# Patient Record
Sex: MALE | Employment: OTHER | URBAN - METROPOLITAN AREA
[De-identification: names, ages, dates, MRNs, and addresses within clinical notes are randomized per-mention and may not be internally consistent; named-entity substitution may affect disease eponyms.]

---

## 2019-07-04 PROBLEM — I50.9 ACUTE ON CHRONIC HEART FAILURE: Status: ACTIVE | Noted: 2019-07-04

## 2019-07-05 ENCOUNTER — HOSPITAL ENCOUNTER (INPATIENT)
Facility: HOSPITAL | Age: 74
LOS: 7 days | Discharge: HOME HEALTH | DRG: 291 | End: 2019-07-12
Attending: INTERNAL MEDICINE | Admitting: INTERNAL MEDICINE
Payer: MEDICARE

## 2019-07-05 DIAGNOSIS — A49.8 INFECTION DUE TO STENOTROPHOMONAS MALTOPHILIA: ICD-10-CM

## 2019-07-05 DIAGNOSIS — J18.9 PNEUMONIA: ICD-10-CM

## 2019-07-05 DIAGNOSIS — J44.1 COPD EXACERBATION: ICD-10-CM

## 2019-07-05 DIAGNOSIS — J96.12 CHRONIC HYPERCAPNIC RESPIRATORY FAILURE: ICD-10-CM

## 2019-07-05 DIAGNOSIS — I48.0 PAROXYSMAL ATRIAL FIBRILLATION: ICD-10-CM

## 2019-07-05 DIAGNOSIS — R00.0 TACHYCARDIA: ICD-10-CM

## 2019-07-05 DIAGNOSIS — I48.91 A-FIB: ICD-10-CM

## 2019-07-05 DIAGNOSIS — I50.9 ACUTE ON CHRONIC HEART FAILURE: ICD-10-CM

## 2019-07-05 DIAGNOSIS — I49.9 ARRHYTHMIA: ICD-10-CM

## 2019-07-05 DIAGNOSIS — I25.10 CAD (CORONARY ARTERY DISEASE): ICD-10-CM

## 2019-07-05 DIAGNOSIS — J90 PLEURAL EFFUSION: ICD-10-CM

## 2019-07-05 DIAGNOSIS — I35.0 SEVERE AORTIC STENOSIS: ICD-10-CM

## 2019-07-05 DIAGNOSIS — I35.0 AORTIC STENOSIS: ICD-10-CM

## 2019-07-05 DIAGNOSIS — I10 HYPERTENSION: ICD-10-CM

## 2019-07-05 DIAGNOSIS — I35.0 NONRHEUMATIC AORTIC VALVE STENOSIS: ICD-10-CM

## 2019-07-05 DIAGNOSIS — J18.9 PNEUMONIA DUE TO INFECTIOUS ORGANISM, UNSPECIFIED LATERALITY, UNSPECIFIED PART OF LUNG: ICD-10-CM

## 2019-07-05 DIAGNOSIS — I34.0 NON-RHEUMATIC MITRAL REGURGITATION: ICD-10-CM

## 2019-07-05 DIAGNOSIS — I27.20 PULMONARY HYPERTENSION: Primary | ICD-10-CM

## 2019-07-05 PROBLEM — J44.9 COPD (CHRONIC OBSTRUCTIVE PULMONARY DISEASE): Status: ACTIVE | Noted: 2019-07-05

## 2019-07-05 LAB
ALBUMIN SERPL BCP-MCNC: 3.2 G/DL (ref 3.5–5.2)
ALP SERPL-CCNC: 59 U/L (ref 55–135)
ALT SERPL W/O P-5'-P-CCNC: 38 U/L (ref 10–44)
ANION GAP SERPL CALC-SCNC: 12 MMOL/L (ref 8–16)
APTT BLDCRRT: 26.2 SEC (ref 21–32)
APTT BLDCRRT: 44.3 SEC (ref 21–32)
APTT BLDCRRT: >150 SEC (ref 21–32)
AST SERPL-CCNC: 19 U/L (ref 10–40)
BASOPHILS # BLD AUTO: 0.01 K/UL (ref 0–0.2)
BASOPHILS NFR BLD: 0.1 % (ref 0–1.9)
BILIRUB SERPL-MCNC: 0.6 MG/DL (ref 0.1–1)
BILIRUB UR QL STRIP: NEGATIVE
BUN SERPL-MCNC: 79 MG/DL (ref 8–23)
CALCIUM SERPL-MCNC: 8.7 MG/DL (ref 8.7–10.5)
CHLORIDE SERPL-SCNC: 101 MMOL/L (ref 95–110)
CLARITY UR REFRACT.AUTO: CLEAR
CO2 SERPL-SCNC: 28 MMOL/L (ref 23–29)
COLOR UR AUTO: NORMAL
CREAT SERPL-MCNC: 2.6 MG/DL (ref 0.5–1.4)
DIFFERENTIAL METHOD: ABNORMAL
EOSINOPHIL # BLD AUTO: 0 K/UL (ref 0–0.5)
EOSINOPHIL NFR BLD: 0 % (ref 0–8)
ERYTHROCYTE [DISTWIDTH] IN BLOOD BY AUTOMATED COUNT: 20.4 % (ref 11.5–14.5)
EST. GFR  (AFRICAN AMERICAN): 26.7 ML/MIN/1.73 M^2
EST. GFR  (NON AFRICAN AMERICAN): 23.1 ML/MIN/1.73 M^2
GLUCOSE SERPL-MCNC: 108 MG/DL (ref 70–110)
GLUCOSE UR QL STRIP: NEGATIVE
HCT VFR BLD AUTO: 27.1 % (ref 40–54)
HGB BLD-MCNC: 7.6 G/DL (ref 14–18)
HGB UR QL STRIP: NEGATIVE
IMM GRANULOCYTES # BLD AUTO: 0.18 K/UL (ref 0–0.04)
IMM GRANULOCYTES NFR BLD AUTO: 0.9 % (ref 0–0.5)
INR PPP: 1.1 (ref 0.8–1.2)
KETONES UR QL STRIP: NEGATIVE
LEUKOCYTE ESTERASE UR QL STRIP: NEGATIVE
LYMPHOCYTES # BLD AUTO: 1.3 K/UL (ref 1–4.8)
LYMPHOCYTES NFR BLD: 6.6 % (ref 18–48)
MAGNESIUM SERPL-MCNC: 2.7 MG/DL (ref 1.6–2.6)
MCH RBC QN AUTO: 19.5 PG (ref 27–31)
MCHC RBC AUTO-ENTMCNC: 28 G/DL (ref 32–36)
MCV RBC AUTO: 70 FL (ref 82–98)
MONOCYTES # BLD AUTO: 1.5 K/UL (ref 0.3–1)
MONOCYTES NFR BLD: 7.6 % (ref 4–15)
NEUTROPHILS # BLD AUTO: 16.3 K/UL (ref 1.8–7.7)
NEUTROPHILS NFR BLD: 84.8 % (ref 38–73)
NITRITE UR QL STRIP: NEGATIVE
NRBC BLD-RTO: 0 /100 WBC
PH UR STRIP: 5 [PH] (ref 5–8)
PHOSPHATE SERPL-MCNC: 6.1 MG/DL (ref 2.7–4.5)
PLATELET # BLD AUTO: 231 K/UL (ref 150–350)
PMV BLD AUTO: 11.7 FL (ref 9.2–12.9)
POTASSIUM SERPL-SCNC: 4.6 MMOL/L (ref 3.5–5.1)
PROT SERPL-MCNC: 5.7 G/DL (ref 6–8.4)
PROT UR QL STRIP: NEGATIVE
PROTHROMBIN TIME: 11.4 SEC (ref 9–12.5)
RBC # BLD AUTO: 3.9 M/UL (ref 4.6–6.2)
SODIUM SERPL-SCNC: 141 MMOL/L (ref 136–145)
SP GR UR STRIP: 1.02 (ref 1–1.03)
URN SPEC COLLECT METH UR: NORMAL
WBC # BLD AUTO: 19.26 K/UL (ref 3.9–12.7)

## 2019-07-05 PROCEDURE — 85610 PROTHROMBIN TIME: CPT

## 2019-07-05 PROCEDURE — 85730 THROMBOPLASTIN TIME PARTIAL: CPT

## 2019-07-05 PROCEDURE — 25000003 PHARM REV CODE 250: Performed by: STUDENT IN AN ORGANIZED HEALTH CARE EDUCATION/TRAINING PROGRAM

## 2019-07-05 PROCEDURE — 81003 URINALYSIS AUTO W/O SCOPE: CPT

## 2019-07-05 PROCEDURE — 85025 COMPLETE CBC W/AUTO DIFF WBC: CPT

## 2019-07-05 PROCEDURE — 20000000 HC ICU ROOM

## 2019-07-05 PROCEDURE — 27000221 HC OXYGEN, UP TO 24 HOURS

## 2019-07-05 PROCEDURE — 99223 1ST HOSP IP/OBS HIGH 75: CPT | Mod: ,,, | Performed by: INTERNAL MEDICINE

## 2019-07-05 PROCEDURE — 83735 ASSAY OF MAGNESIUM: CPT

## 2019-07-05 PROCEDURE — 63600175 PHARM REV CODE 636 W HCPCS

## 2019-07-05 PROCEDURE — 99223 PR INITIAL HOSPITAL CARE,LEVL III: ICD-10-PCS | Mod: ,,, | Performed by: INTERNAL MEDICINE

## 2019-07-05 PROCEDURE — 85730 THROMBOPLASTIN TIME PARTIAL: CPT | Mod: 91

## 2019-07-05 PROCEDURE — 94640 AIRWAY INHALATION TREATMENT: CPT

## 2019-07-05 PROCEDURE — 25000242 PHARM REV CODE 250 ALT 637 W/ HCPCS

## 2019-07-05 PROCEDURE — 25000003 PHARM REV CODE 250

## 2019-07-05 PROCEDURE — 80053 COMPREHEN METABOLIC PANEL: CPT

## 2019-07-05 PROCEDURE — 84100 ASSAY OF PHOSPHORUS: CPT

## 2019-07-05 RX ORDER — METOPROLOL TARTRATE 25 MG/1
25 TABLET, FILM COATED ORAL 2 TIMES DAILY
Status: DISCONTINUED | OUTPATIENT
Start: 2019-07-05 | End: 2019-07-12 | Stop reason: HOSPADM

## 2019-07-05 RX ORDER — SODIUM,POTASSIUM PHOSPHATES 280-250MG
2 POWDER IN PACKET (EA) ORAL
Status: DISCONTINUED | OUTPATIENT
Start: 2019-07-05 | End: 2019-07-12 | Stop reason: HOSPADM

## 2019-07-05 RX ORDER — POTASSIUM CHLORIDE 20 MEQ/15ML
60 SOLUTION ORAL
Status: DISCONTINUED | OUTPATIENT
Start: 2019-07-05 | End: 2019-07-12 | Stop reason: HOSPADM

## 2019-07-05 RX ORDER — ALBUTEROL SULFATE 90 UG/1
2 AEROSOL, METERED RESPIRATORY (INHALATION) EVERY 6 HOURS PRN
COMMUNITY

## 2019-07-05 RX ORDER — LANOLIN ALCOHOL/MO/W.PET/CERES
800 CREAM (GRAM) TOPICAL
Status: DISCONTINUED | OUTPATIENT
Start: 2019-07-05 | End: 2019-07-12 | Stop reason: HOSPADM

## 2019-07-05 RX ORDER — ACETAMINOPHEN 325 MG/1
325 TABLET ORAL EVERY 4 HOURS PRN
Status: DISCONTINUED | OUTPATIENT
Start: 2019-07-05 | End: 2019-07-12 | Stop reason: HOSPADM

## 2019-07-05 RX ORDER — ONDANSETRON 2 MG/ML
4 INJECTION INTRAMUSCULAR; INTRAVENOUS EVERY 8 HOURS PRN
Status: DISCONTINUED | OUTPATIENT
Start: 2019-07-05 | End: 2019-07-12 | Stop reason: HOSPADM

## 2019-07-05 RX ORDER — IPRATROPIUM BROMIDE AND ALBUTEROL SULFATE 2.5; .5 MG/3ML; MG/3ML
3 SOLUTION RESPIRATORY (INHALATION) EVERY 6 HOURS PRN
Status: DISCONTINUED | OUTPATIENT
Start: 2019-07-05 | End: 2019-07-11

## 2019-07-05 RX ORDER — CALCIUM CARBONATE 200(500)MG
500 TABLET,CHEWABLE ORAL DAILY PRN
Status: DISCONTINUED | OUTPATIENT
Start: 2019-07-05 | End: 2019-07-05

## 2019-07-05 RX ORDER — HEPARIN SODIUM,PORCINE/D5W 25000/250
12 INTRAVENOUS SOLUTION INTRAVENOUS CONTINUOUS
Status: DISCONTINUED | OUTPATIENT
Start: 2019-07-05 | End: 2019-07-12

## 2019-07-05 RX ORDER — AMLODIPINE BESYLATE 10 MG/1
10 TABLET ORAL DAILY
Status: DISCONTINUED | OUTPATIENT
Start: 2019-07-05 | End: 2019-07-05

## 2019-07-05 RX ORDER — ROSUVASTATIN CALCIUM 20 MG/1
20 TABLET, COATED ORAL DAILY
Status: DISCONTINUED | OUTPATIENT
Start: 2019-07-05 | End: 2019-07-07

## 2019-07-05 RX ORDER — POLYETHYLENE GLYCOL 3350 17 G/17G
17 POWDER, FOR SOLUTION ORAL DAILY
Status: DISCONTINUED | OUTPATIENT
Start: 2019-07-05 | End: 2019-07-12 | Stop reason: HOSPADM

## 2019-07-05 RX ORDER — METOPROLOL TARTRATE 25 MG/1
25 TABLET, FILM COATED ORAL 2 TIMES DAILY
COMMUNITY

## 2019-07-05 RX ORDER — AMLODIPINE BESYLATE 10 MG/1
10 TABLET ORAL DAILY
Status: ON HOLD | COMMUNITY
End: 2019-07-11 | Stop reason: HOSPADM

## 2019-07-05 RX ORDER — CALCIUM CARBONATE 200(500)MG
500 TABLET,CHEWABLE ORAL DAILY PRN
Status: DISCONTINUED | OUTPATIENT
Start: 2019-07-05 | End: 2019-07-07

## 2019-07-05 RX ORDER — POTASSIUM CHLORIDE 20 MEQ/15ML
40 SOLUTION ORAL
Status: DISCONTINUED | OUTPATIENT
Start: 2019-07-05 | End: 2019-07-12 | Stop reason: HOSPADM

## 2019-07-05 RX ORDER — METHYLPREDNISOLONE 4 MG/1
4 TABLET ORAL
COMMUNITY

## 2019-07-05 RX ORDER — NITROGLYCERIN 0.4 MG/1
0.4 TABLET SUBLINGUAL EVERY 5 MIN PRN
Status: DISCONTINUED | OUTPATIENT
Start: 2019-07-05 | End: 2019-07-12 | Stop reason: HOSPADM

## 2019-07-05 RX ORDER — AMIODARONE HYDROCHLORIDE 200 MG/1
400 TABLET ORAL 2 TIMES DAILY
Status: ON HOLD | COMMUNITY
End: 2019-07-11 | Stop reason: HOSPADM

## 2019-07-05 RX ORDER — NITROGLYCERIN 0.4 MG/1
0.4 TABLET SUBLINGUAL EVERY 5 MIN PRN
COMMUNITY

## 2019-07-05 RX ORDER — GUAIFENESIN 600 MG/1
600 TABLET, EXTENDED RELEASE ORAL DAILY
COMMUNITY

## 2019-07-05 RX ORDER — FUROSEMIDE 40 MG/1
40 TABLET ORAL DAILY
Status: ON HOLD | COMMUNITY
End: 2019-07-11 | Stop reason: HOSPADM

## 2019-07-05 RX ORDER — GUAIFENESIN 600 MG/1
600 TABLET, EXTENDED RELEASE ORAL DAILY
Status: DISCONTINUED | OUTPATIENT
Start: 2019-07-05 | End: 2019-07-12 | Stop reason: HOSPADM

## 2019-07-05 RX ORDER — HEPARIN SODIUM 5000 [USP'U]/ML
5000 INJECTION, SOLUTION INTRAVENOUS; SUBCUTANEOUS EVERY 8 HOURS
Status: DISCONTINUED | OUTPATIENT
Start: 2019-07-05 | End: 2019-07-05

## 2019-07-05 RX ORDER — AMIODARONE HYDROCHLORIDE 200 MG/1
400 TABLET ORAL 2 TIMES DAILY
Status: DISCONTINUED | OUTPATIENT
Start: 2019-07-05 | End: 2019-07-10

## 2019-07-05 RX ORDER — ROSUVASTATIN CALCIUM 20 MG/1
20 TABLET, COATED ORAL DAILY
COMMUNITY

## 2019-07-05 RX ORDER — POTASSIUM CHLORIDE 750 MG/1
20 TABLET, EXTENDED RELEASE ORAL ONCE
COMMUNITY

## 2019-07-05 RX ORDER — IPRATROPIUM BROMIDE 0.5 MG/2.5ML
500 SOLUTION RESPIRATORY (INHALATION) 4 TIMES DAILY
COMMUNITY

## 2019-07-05 RX ORDER — PANTOPRAZOLE SODIUM 40 MG/1
40 TABLET, DELAYED RELEASE ORAL DAILY
Status: DISCONTINUED | OUTPATIENT
Start: 2019-07-05 | End: 2019-07-12 | Stop reason: HOSPADM

## 2019-07-05 RX ORDER — ALBUTEROL SULFATE 1.25 MG/3ML
2.5 SOLUTION RESPIRATORY (INHALATION) EVERY 6 HOURS PRN
COMMUNITY

## 2019-07-05 RX ORDER — FUROSEMIDE 10 MG/ML
40 INJECTION INTRAMUSCULAR; INTRAVENOUS DAILY
Status: DISCONTINUED | OUTPATIENT
Start: 2019-07-05 | End: 2019-07-09

## 2019-07-05 RX ORDER — SODIUM CHLORIDE 0.9 % (FLUSH) 0.9 %
10 SYRINGE (ML) INJECTION
Status: DISCONTINUED | OUTPATIENT
Start: 2019-07-05 | End: 2019-07-12 | Stop reason: HOSPADM

## 2019-07-05 RX ORDER — LANSOPRAZOLE 30 MG/1
30 CAPSULE, DELAYED RELEASE ORAL DAILY
COMMUNITY

## 2019-07-05 RX ORDER — RAMELTEON 8 MG/1
8 TABLET ORAL NIGHTLY PRN
Status: DISCONTINUED | OUTPATIENT
Start: 2019-07-05 | End: 2019-07-12 | Stop reason: HOSPADM

## 2019-07-05 RX ADMIN — PANTOPRAZOLE SODIUM 40 MG: 40 TABLET, DELAYED RELEASE ORAL at 12:07

## 2019-07-05 RX ADMIN — FUROSEMIDE 40 MG: 10 INJECTION, SOLUTION INTRAVENOUS at 12:07

## 2019-07-05 RX ADMIN — CALCIUM CARBONATE (ANTACID) CHEW TAB 500 MG 500 MG: 500 CHEW TAB at 05:07

## 2019-07-05 RX ADMIN — ROSUVASTATIN CALCIUM 20 MG: 20 TABLET, FILM COATED ORAL at 12:07

## 2019-07-05 RX ADMIN — METOPROLOL TARTRATE 25 MG: 25 TABLET, FILM COATED ORAL at 09:07

## 2019-07-05 RX ADMIN — AMIODARONE HYDROCHLORIDE 400 MG: 200 TABLET ORAL at 09:07

## 2019-07-05 RX ADMIN — AMIODARONE HYDROCHLORIDE 400 MG: 200 TABLET ORAL at 12:07

## 2019-07-05 RX ADMIN — METOPROLOL TARTRATE 25 MG: 25 TABLET, FILM COATED ORAL at 12:07

## 2019-07-05 RX ADMIN — IPRATROPIUM BROMIDE AND ALBUTEROL SULFATE 3 ML: .5; 3 SOLUTION RESPIRATORY (INHALATION) at 08:07

## 2019-07-05 RX ADMIN — GUAIFENESIN 600 MG: 600 TABLET, EXTENDED RELEASE ORAL at 12:07

## 2019-07-05 RX ADMIN — HEPARIN SODIUM AND DEXTROSE 12 UNITS/KG/HR: 10000; 5 INJECTION INTRAVENOUS at 02:07

## 2019-07-05 RX ADMIN — POLYETHYLENE GLYCOL 3350 17 G: 17 POWDER, FOR SOLUTION ORAL at 12:07

## 2019-07-05 NOTE — ASSESSMENT & PLAN NOTE
50+ year smoking history; on home O2. Received nebs and solu-medrol at OSH.     Plan:  - duo-nebs prn  - oxygen support with NC, goal Sat 88%

## 2019-07-05 NOTE — ASSESSMENT & PLAN NOTE
TTE at OSH showed severe MR, moderate AS and TR with preserved LV function. Patient is transferred to [facility] [place] for further evaluation of possible MVR/TVR/AVR vs TAVR/MV clip in a stable condition by [facility].     Plan:  - Consult CTS and Interventional Cardiology for options

## 2019-07-05 NOTE — SUBJECTIVE & OBJECTIVE
No past medical history on file.    No past surgical history on file.    Review of patient's allergies indicates:   Allergen Reactions    Bacitracin/polymyxin      inflammation    Bactrim [sulfamethoxazole-trimethoprim] Blisters       No current facility-administered medications on file prior to encounter.      Current Outpatient Medications on File Prior to Encounter   Medication Sig    albuterol (ACCUNEB) 1.25 mg/3 mL Nebu Take 2.5 mg by nebulization every 6 (six) hours as needed. Rescue    albuterol (PROAIR HFA) 90 mcg/actuation inhaler Inhale 2 puffs into the lungs every 6 (six) hours as needed for Wheezing. Rescue    amiodarone (PACERONE) 200 MG Tab Take 400 mg by mouth 2 (two) times daily.    amLODIPine (NORVASC) 10 MG tablet Take 10 mg by mouth once daily.    apixaban (ELIQUIS) 5 mg Tab Take 5 mg by mouth 2 (two) times daily.    fluticasone-umeclidin-vilanter (TRELEGY ELLIPTA) 100-62.5-25 mcg DsDv Inhale 1 puff into the lungs once daily.    furosemide (LASIX) 40 MG tablet Take 40 mg by mouth once daily.    guaiFENesin (MUCINEX) 600 mg 12 hr tablet Take 600 mg by mouth once daily.    ipratropium (ATROVENT) 0.02 % nebulizer solution Take 500 mcg by nebulization 4 (four) times daily. Rescue    lansoprazole (PREVACID) 30 MG capsule Take 30 mg by mouth once daily.    methylPREDNISolone (MEDROL DOSEPACK) 4 mg tablet Take 4 mg by mouth. use as directed    metoprolol tartrate (LOPRESSOR) 25 MG tablet Take 25 mg by mouth 2 (two) times daily.    nitroGLYCERIN (NITROSTAT) 0.4 MG SL tablet Place 0.4 mg under the tongue every 5 (five) minutes as needed for Chest pain.    potassium chloride (KLOR-CON) 10 MEQ TbSR Take 20 mEq by mouth once.    rosuvastatin (CRESTOR) 20 MG tablet Take 20 mg by mouth once daily.     Family History     None        Tobacco Use    Smoking status: Not on file   Substance and Sexual Activity    Alcohol use: Not on file    Drug use: Not on file    Sexual activity: Not on file      Review of Systems   Constitution: Negative for chills and fever.   HENT: Negative for congestion and sore throat.    Eyes: Negative for photophobia and visual disturbance.   Cardiovascular: Positive for dyspnea on exertion. Negative for chest pain, irregular heartbeat, palpitations and syncope.   Respiratory: Positive for cough, shortness of breath and sputum production.    Skin: Negative for itching and rash.   Musculoskeletal: Negative for falls.   Gastrointestinal: Negative for constipation, diarrhea, nausea and vomiting.   Genitourinary: Negative for dysuria and flank pain.   Neurological: Negative for dizziness and headaches.   Psychiatric/Behavioral: Negative for altered mental status and memory loss.     Objective:     Vital Signs (Most Recent):  Temp: 97.9 °F (36.6 °C) (07/05/19 1500)  Pulse: (!) 58 (07/05/19 1600)  Resp: (!) 27 (07/05/19 1600)  BP: 118/61 (07/05/19 1600)  SpO2: 97 % (07/05/19 1600) Vital Signs (24h Range):  Temp:  [97.7 °F (36.5 °C)-98.2 °F (36.8 °C)] 97.9 °F (36.6 °C)  Pulse:  [58-68] 58  Resp:  [18-39] 27  SpO2:  [92 %-99 %] 97 %  BP: (108-125)/(53-63) 118/61        There is no height or weight on file to calculate BMI.    SpO2: 97 %  O2 Device (Oxygen Therapy): nasal cannula      Intake/Output Summary (Last 24 hours) at 7/5/2019 1627  Last data filed at 7/5/2019 1600  Gross per 24 hour   Intake 59.55 ml   Output 400 ml   Net -340.45 ml       Lines/Drains/Airways     Peripheral Intravenous Line                 Peripheral IV - Single Lumen 07/05/19 0100 20 G Distal;Left;Posterior Forearm less than 1 day         Peripheral IV - Single Lumen 07/05/19 1300 20 G Posterior;Right Hand less than 1 day                Physical Exam   Constitutional: He is oriented to person, place, and time. No distress.   HENT:   Head: Normocephalic and atraumatic.   Mouth/Throat: Oropharynx is clear and moist.   Eyes: Right eye exhibits no discharge. Left eye exhibits no discharge. No scleral icterus.   Neck:  JVD present.   Cardiovascular: Normal rate and regular rhythm.   Murmur heard.  Pulmonary/Chest: Effort normal. No stridor. No respiratory distress. He has wheezes.   On 4L NC   Abdominal: Soft. Bowel sounds are normal. He exhibits no distension.   Musculoskeletal: He exhibits edema (bilateral pitting edema to ankles). He exhibits no tenderness.   Neurological: He is alert and oriented to person, place, and time.   Skin: Skin is warm and dry. He is not diaphoretic.   Vitals reviewed.    Significant Labs:   CMP   Recent Labs   Lab 07/05/19  1153      K 4.6      CO2 28      BUN 79*   CREATININE 2.6*   CALCIUM 8.7   PROT 5.7*   ALBUMIN 3.2*   BILITOT 0.6   ALKPHOS 59   AST 19   ALT 38   ANIONGAP 12   ESTGFRAFRICA 26.7*   EGFRNONAA 23.1*   , CBC   Recent Labs   Lab 07/05/19  1153   WBC 19.26*   HGB 7.6*   HCT 27.1*       and All pertinent lab results from the last 24 hours have been reviewed.    Significant Imaging:     CXR pending

## 2019-07-05 NOTE — PLAN OF CARE
Problem: Adult Inpatient Plan of Care  Goal: Plan of Care Review  Outcome: Ongoing (interventions implemented as appropriate)  No acute events throughout day. See vital signs and assessments in flowsheets. See below for updates on today's progress.     Pulmonary: Bilaterally diminished throughout equally, 4L NC. SATs upper 90s.    Cardiovascular: HR 60s. S1S2 present. Mitral valve regurg.    Neurological: AAOx4. Pupils 3mm bilaterally, brisk and equal.    Gastrointestinal: Bowels audible and normoactive. No BM noted.    Genitourinary: Voids spontaneously, urinal within reach.     Skin/Bath: Bruised throughout. Blister on sternum.    Infusions: Heparin.     POC: Stepdown the morning of 7/06/19. Valve evaluation.    Patient progressing towards goals as tolerated, plan of care communicated and reviewed with patient and family. All concerns addressed. Will continue to monitor.

## 2019-07-05 NOTE — ASSESSMENT & PLAN NOTE
74 y.o. M w/ CAD, COPD on home O2, HTN, CVA, pAF (eliquis, amiodarone) presented w/ acute onset of SOB concerning for ADHF and COPD exacerbation. MANUEL found showed severe MR, moderate AS and TR with preserved LV function. Required BIPAP, nitroglycerin gtt and IV diuresis at OSH.     Plan:  - F/u CXR, BNP, electrolytes  - IV furosemide 40mg QD  - continue home lopressor 25mg BID, atorvastatin 40mg  - strict I/O  - daily weights  - monitor in the ICU overnight; step down tomorrow if remains stable

## 2019-07-05 NOTE — H&P
[facility]  Cardiology  History and Physical     Patient Name: Wiley Alfred  MRN: [id number]  Admission Date: 7/5/2019  Code Status: Full Code   Attending Provider: Aaron Bacon MD   Primary Care Physician: Daniel Almeida MD  Principal Problem:Acute on chronic heart failure    Patient information was obtained from patient, past medical records and ER records.     Subjective:     Chief Complaint:  SOB     HPI:  Patient is 74 y.o. M w/ aortic stenosis, CAD, COPD on home O2, CVA, pAF (eliquis, amiodarone) presented to [facility]/ acute onset of SOB. Patient was found to be hypertensive  and in hypoxia and hypercapnia respiratory failure requiring nitroglycerin gtt and BIPAP. Received lasix and nebs with solu-medrol with improvement. Patient received MANUEL which showed severe MR, moderate AS and TR with preserved LV function. He was evaluated by CTS, was deemed high risk as a result of the severe COPD; so was transferred to [facility] [place] for further evaluation of possible MVR/TVR/AVR vs TAVR/MV clip in a stable condition by [facility].     Prior to this admission patient was admitted on 6/23/19  Where he underwent LHC which revealed severe single-vessel CAD s/p PCI; patent stented RCA, moderate AS.     No past medical history on file.    No past surgical history on file.    Review of patient's allergies indicates:   Allergen Reactions    Bacitracin/polymyxin      inflammation    Bactrim [sulfamethoxazole-trimethoprim] Blisters       No current facility-administered medications on file prior to encounter.      Current Outpatient Medications on File Prior to Encounter   Medication Sig    albuterol (ACCUNEB) 1.25 mg/3 mL Nebu Take 2.5 mg by nebulization every 6 (six) hours as needed. Rescue    albuterol (PROAIR HFA) 90 mcg/actuation inhaler Inhale 2 puffs into the lungs every 6 (six) hours as needed for Wheezing. Rescue    amiodarone (PACERONE) 200 MG Tab Take 400 mg by mouth 2 (two) times  daily.    amLODIPine (NORVASC) 10 MG tablet Take 10 mg by mouth once daily.    apixaban (ELIQUIS) 5 mg Tab Take 5 mg by mouth 2 (two) times daily.    fluticasone-umeclidin-vilanter (TRELEGY ELLIPTA) 100-62.5-25 mcg DsDv Inhale 1 puff into the lungs once daily.    furosemide (LASIX) 40 MG tablet Take 40 mg by mouth once daily.    guaiFENesin (MUCINEX) 600 mg 12 hr tablet Take 600 mg by mouth once daily.    ipratropium (ATROVENT) 0.02 % nebulizer solution Take 500 mcg by nebulization 4 (four) times daily. Rescue    lansoprazole (PREVACID) 30 MG capsule Take 30 mg by mouth once daily.    methylPREDNISolone (MEDROL DOSEPACK) 4 mg tablet Take 4 mg by mouth. use as directed    metoprolol tartrate (LOPRESSOR) 25 MG tablet Take 25 mg by mouth 2 (two) times daily.    nitroGLYCERIN (NITROSTAT) 0.4 MG SL tablet Place 0.4 mg under the tongue every 5 (five) minutes as needed for Chest pain.    potassium chloride (KLOR-CON) 10 MEQ TbSR Take 20 mEq by mouth once.    rosuvastatin (CRESTOR) 20 MG tablet Take 20 mg by mouth once daily.     Family History     None        Tobacco Use    Smoking status: Not on file   Substance and Sexual Activity    Alcohol use: Not on file    Drug use: Not on file    Sexual activity: Not on file     Review of Systems   Constitution: Negative for chills and fever.   HENT: Negative for congestion and sore throat.    Eyes: Negative for photophobia and visual disturbance.   Cardiovascular: Positive for dyspnea on exertion. Negative for chest pain, irregular heartbeat, palpitations and syncope.   Respiratory: Positive for cough, shortness of breath and sputum production.    Skin: Negative for itching and rash.   Musculoskeletal: Negative for falls.   Gastrointestinal: Negative for constipation, diarrhea, nausea and vomiting.   Genitourinary: Negative for dysuria and flank pain.   Neurological: Negative for dizziness and headaches.   Psychiatric/Behavioral: Negative for altered mental status  and memory loss.     Objective:     Vital Signs (Most Recent):  Temp: 97.9 °F (36.6 °C) (07/05/19 1500)  Pulse: (!) 58 (07/05/19 1600)  Resp: (!) 27 (07/05/19 1600)  BP: 118/61 (07/05/19 1600)  SpO2: 97 % (07/05/19 1600) Vital Signs (24h Range):  Temp:  [97.7 °F (36.5 °C)-98.2 °F (36.8 °C)] 97.9 °F (36.6 °C)  Pulse:  [58-68] 58  Resp:  [18-39] 27  SpO2:  [92 %-99 %] 97 %  BP: (108-125)/(53-63) 118/61        There is no height or weight on file to calculate BMI.    SpO2: 97 %  O2 Device (Oxygen Therapy): nasal cannula      Intake/Output Summary (Last 24 hours) at 7/5/2019 1627  Last data filed at 7/5/2019 1600  Gross per 24 hour   Intake 59.55 ml   Output 400 ml   Net -340.45 ml       Lines/Drains/Airways     Peripheral Intravenous Line                 Peripheral IV - Single Lumen 07/05/19 0100 20 G Distal;Left;Posterior Forearm less than 1 day         Peripheral IV - Single Lumen 07/05/19 1300 20 G Posterior;Right Hand less than 1 day                Physical Exam   Constitutional: He is oriented to person, place, and time. No distress.   HENT:   Head: Normocephalic and atraumatic.   Mouth/Throat: Oropharynx is clear and moist.   Eyes: Right eye exhibits no discharge. Left eye exhibits no discharge. No scleral icterus.   Neck: JVD present.   Cardiovascular: Normal rate and regular rhythm.   Murmur heard.  Pulmonary/Chest: Effort normal. No stridor. No respiratory distress. He has wheezes.   On 4L NC   Abdominal: Soft. Bowel sounds are normal. He exhibits no distension.   Musculoskeletal: He exhibits edema (bilateral pitting edema to ankles). He exhibits no tenderness.   Neurological: He is alert and oriented to person, place, and time.   Skin: Skin is warm and dry. He is not diaphoretic.   Vitals reviewed.    Significant Labs:   CMP   Recent Labs   Lab 07/05/19  1153      K 4.6      CO2 28      BUN 79*   CREATININE 2.6*   CALCIUM 8.7   PROT 5.7*   ALBUMIN 3.2*   BILITOT 0.6   ALKPHOS 59   AST 19    ALT 38   ANIONGAP 12   ESTGFRAFRICA 26.7*   EGFRNONAA 23.1*   , CBC   Recent Labs   Lab 07/05/19  1153   WBC 19.26*   HGB 7.6*   HCT 27.1*       and All pertinent lab results from the last 24 hours have been reviewed.    Significant Imaging:     CXR pending    Assessment and Plan:     * Acute on chronic heart failure  74 y.o. M w/ CAD, COPD on home O2, HTN, CVA, pAF (eliquis, amiodarone) presented w/ acute onset of SOB concerning for ADHF and COPD exacerbation. MANUEL found showed severe MR, moderate AS and TR with preserved LV function. Required BIPAP, nitroglycerin gtt and IV diuresis at OSH.     Plan:  - F/u CXR, BNP, electrolytes  - IV furosemide 40mg QD  - continue home lopressor 25mg BID, atorvastatin 40mg  - strict I/O  - daily weights  - monitor in the ICU overnight; step down tomorrow if remains stable    CAD (coronary artery disease)  Prior to this admission patient was admitted on 6/23/19  Where he underwent LHC which revealed severe single-vessel CAD s/p PCI; patent stented RCA, moderate AS.     Hypertension   at OSH requiring nitroglycerin gtt.     - holding home Norvasc for now    COPD exacerbation  50+ year smoking history; on home O2. Received nebs and solu-medrol at OSH.     Plan:  - duo-nebs prn  - oxygen support with NC, goal Sat 88%    Paroxysmal atrial fibrillation  Holding home eliquis  Continue home amiodarone    Severe aortic stenosis  TTE at OSH showed severe MR, moderate AS and TR with preserved LV function. Patient is transferred to [facility] for further evaluation of possible MVR/TVR/AVR vs TAVR/MV clip in a stable condition by [facility].     Plan:  - Consult CTS and Interventional Cardiology for options      VTE Risk Mitigation (From admission, onward)        Ordered     heparin 25,000 units in dextrose 5% 250 mL (100 units/mL) infusion LOW INTENSITY nomogram - [facility]  Continuous      07/05/19 1152     heparin 25,000 units in dextrose 5% (100 units/ml) IV bolus from bag - ADDITIONAL PRN  BOLUS - 30 units/kg  As needed (PRN)      07/05/19 1152     heparin 25,000 units in dextrose 5% (100 units/ml) IV bolus from bag - ADDITIONAL PRN BOLUS - 60 units/kg  As needed (PRN)      07/05/19 1152     IP VTE HIGH RISK PATIENT  Once      07/05/19 1148        Cierra uMllen MD  Cardiology   [facility]

## 2019-07-05 NOTE — ASSESSMENT & PLAN NOTE
Prior to this admission patient was admitted on 6/23/19  Where he underwent LHC which revealed severe single-vessel CAD s/p PCI; patent stented RCA, moderate AS.

## 2019-07-05 NOTE — HPI
Patient is 74 y.o. M w/ aortic stenosis, CAD, COPD on home O2, CVA, pAF (eliquis, amiodarone) presented to [facility]/ acute onset of SOB. Patient was found to be hypertensive  and in hypoxia and hypercapnia respiratory failure requiring nitroglycerin gtt and BIPAP. Received lasix and nebs with solu-medrol with improvement. Patient received MANUEL which showed severe MR, moderate AS and TR with preserved LV function. He was evaluated by CTS, was deemed high risk as a result of the severe COPD; so was transferred to [facility] for further evaluation of possible MVR/TVR/AVR vs TAVR/MV clip in a stable condition by [facility].     Prior to this admission patient was admitted on 6/23/19  Where he underwent LHC which revealed severe single-vessel CAD s/p PCI; patent stented RCA, moderate AS.

## 2019-07-06 LAB
ALBUMIN SERPL BCP-MCNC: 3.1 G/DL (ref 3.5–5.2)
ALP SERPL-CCNC: 61 U/L (ref 55–135)
ALT SERPL W/O P-5'-P-CCNC: 39 U/L (ref 10–44)
ANION GAP SERPL CALC-SCNC: 13 MMOL/L (ref 8–16)
APTT BLDCRRT: 42.4 SEC (ref 21–32)
ASCENDING AORTA: 3.2 CM
AST SERPL-CCNC: 20 U/L (ref 10–40)
AV INDEX (PROSTH): 0.25
AV MEAN GRADIENT: 44 MMHG
AV PEAK GRADIENT: 69 MMHG
AV VALVE AREA: 0.99 CM2
AV VELOCITY RATIO: 0.27
BASOPHILS # BLD AUTO: 0.02 K/UL (ref 0–0.2)
BASOPHILS # BLD AUTO: 0.02 K/UL (ref 0–0.2)
BASOPHILS NFR BLD: 0.1 % (ref 0–1.9)
BASOPHILS NFR BLD: 0.1 % (ref 0–1.9)
BILIRUB SERPL-MCNC: 0.7 MG/DL (ref 0.1–1)
BNP SERPL-MCNC: 1067 PG/ML (ref 0–99)
BUN SERPL-MCNC: 78 MG/DL (ref 8–23)
CALCIUM SERPL-MCNC: 8.4 MG/DL (ref 8.7–10.5)
CHLORIDE SERPL-SCNC: 100 MMOL/L (ref 95–110)
CO2 SERPL-SCNC: 29 MMOL/L (ref 23–29)
CREAT SERPL-MCNC: 2.5 MG/DL (ref 0.5–1.4)
CV ECHO LV RWT: 0.32 CM
DIFFERENTIAL METHOD: ABNORMAL
DIFFERENTIAL METHOD: ABNORMAL
DOP CALC AO PEAK VEL: 4.14 M/S
DOP CALC AO VTI: 92.22 CM
DOP CALC LVOT AREA: 3.9 CM2
DOP CALC LVOT DIAMETER: 2.24 CM
DOP CALC LVOT PEAK VEL: 1.11 M/S
DOP CALC LVOT STROKE VOLUME: 90.99 CM3
DOP CALCLVOT PEAK VEL VTI: 23.1 CM
E WAVE DECELERATION TIME: 188.46 MSEC
E/A RATIO: 4.02
E/E' RATIO: 15.71 M/S
ECHO LV POSTERIOR WALL: 0.97 CM (ref 0.6–1.1)
EOSINOPHIL # BLD AUTO: 0 K/UL (ref 0–0.5)
EOSINOPHIL # BLD AUTO: 0 K/UL (ref 0–0.5)
EOSINOPHIL NFR BLD: 0.1 % (ref 0–8)
EOSINOPHIL NFR BLD: 0.1 % (ref 0–8)
ERYTHROCYTE [DISTWIDTH] IN BLOOD BY AUTOMATED COUNT: 20.6 % (ref 11.5–14.5)
ERYTHROCYTE [DISTWIDTH] IN BLOOD BY AUTOMATED COUNT: 20.6 % (ref 11.5–14.5)
EST. GFR  (AFRICAN AMERICAN): 28 ML/MIN/1.73 M^2
EST. GFR  (NON AFRICAN AMERICAN): 24.2 ML/MIN/1.73 M^2
FRACTIONAL SHORTENING: 37 % (ref 28–44)
GLUCOSE SERPL-MCNC: 84 MG/DL (ref 70–110)
HCT VFR BLD AUTO: 28.3 % (ref 40–54)
HCT VFR BLD AUTO: 28.3 % (ref 40–54)
HGB BLD-MCNC: 8 G/DL (ref 14–18)
HGB BLD-MCNC: 8 G/DL (ref 14–18)
IMM GRANULOCYTES # BLD AUTO: 0.15 K/UL (ref 0–0.04)
IMM GRANULOCYTES # BLD AUTO: 0.15 K/UL (ref 0–0.04)
IMM GRANULOCYTES NFR BLD AUTO: 0.8 % (ref 0–0.5)
IMM GRANULOCYTES NFR BLD AUTO: 0.8 % (ref 0–0.5)
INTERVENTRICULAR SEPTUM: 0.99 CM (ref 0.6–1.1)
IVRT: 0.05 MSEC
LA MAJOR: 7.09 CM
LA MINOR: 7.12 CM
LA WIDTH: 5.69 CM
LEFT ATRIUM SIZE: 5.97 CM
LEFT ATRIUM VOLUME: 205.15 CM3
LEFT INTERNAL DIMENSION IN SYSTOLE: 3.79 CM (ref 2.1–4)
LEFT VENTRICLE DIASTOLIC VOLUME: 183.31 ML
LEFT VENTRICLE SYSTOLIC VOLUME: 61.52 ML
LEFT VENTRICULAR INTERNAL DIMENSION IN DIASTOLE: 6.05 CM (ref 3.5–6)
LEFT VENTRICULAR MASS: 243.94 G
LV LATERAL E/E' RATIO: 16.5 M/S
LV SEPTAL E/E' RATIO: 15 M/S
LYMPHOCYTES # BLD AUTO: 1.9 K/UL (ref 1–4.8)
LYMPHOCYTES # BLD AUTO: 1.9 K/UL (ref 1–4.8)
LYMPHOCYTES NFR BLD: 10.2 % (ref 18–48)
LYMPHOCYTES NFR BLD: 10.2 % (ref 18–48)
MAGNESIUM SERPL-MCNC: 2.6 MG/DL (ref 1.6–2.6)
MCH RBC QN AUTO: 19.8 PG (ref 27–31)
MCH RBC QN AUTO: 19.8 PG (ref 27–31)
MCHC RBC AUTO-ENTMCNC: 28.3 G/DL (ref 32–36)
MCHC RBC AUTO-ENTMCNC: 28.3 G/DL (ref 32–36)
MCV RBC AUTO: 70 FL (ref 82–98)
MCV RBC AUTO: 70 FL (ref 82–98)
MONOCYTES # BLD AUTO: 1.8 K/UL (ref 0.3–1)
MONOCYTES # BLD AUTO: 1.8 K/UL (ref 0.3–1)
MONOCYTES NFR BLD: 9.3 % (ref 4–15)
MONOCYTES NFR BLD: 9.3 % (ref 4–15)
MV PEAK A VEL: 0.41 M/S
MV PEAK E VEL: 1.65 M/S
NEUTROPHILS # BLD AUTO: 15 K/UL (ref 1.8–7.7)
NEUTROPHILS # BLD AUTO: 15 K/UL (ref 1.8–7.7)
NEUTROPHILS NFR BLD: 79.5 % (ref 38–73)
NEUTROPHILS NFR BLD: 79.5 % (ref 38–73)
NRBC BLD-RTO: 0 /100 WBC
NRBC BLD-RTO: 0 /100 WBC
PHOSPHATE SERPL-MCNC: 5.4 MG/DL (ref 2.7–4.5)
PISA TR MAX VEL: 3.8 M/S
PLATELET # BLD AUTO: 194 K/UL (ref 150–350)
PLATELET # BLD AUTO: 194 K/UL (ref 150–350)
PMV BLD AUTO: ABNORMAL FL (ref 9.2–12.9)
PMV BLD AUTO: ABNORMAL FL (ref 9.2–12.9)
POTASSIUM SERPL-SCNC: 3.9 MMOL/L (ref 3.5–5.1)
PROT SERPL-MCNC: 5.9 G/DL (ref 6–8.4)
RA MAJOR: 5.84 CM
RA PRESSURE: 3 MMHG
RA WIDTH: 4.98 CM
RBC # BLD AUTO: 4.05 M/UL (ref 4.6–6.2)
RBC # BLD AUTO: 4.05 M/UL (ref 4.6–6.2)
RIGHT VENTRICULAR END-DIASTOLIC DIMENSION: 3.82 CM
RV TISSUE DOPPLER FREE WALL SYSTOLIC VELOCITY 1 (APICAL 4 CHAMBER VIEW): 13.85 CM/S
SINUS: 3.14 CM
SODIUM SERPL-SCNC: 142 MMOL/L (ref 136–145)
STJ: 2.54 CM
TDI LATERAL: 0.1 M/S
TDI SEPTAL: 0.11 M/S
TDI: 0.11 M/S
TR MAX PG: 58 MMHG
TRICUSPID ANNULAR PLANE SYSTOLIC EXCURSION: 3.14 CM
TV REST PULMONARY ARTERY PRESSURE: 61 MMHG
WBC # BLD AUTO: 18.86 K/UL (ref 3.9–12.7)
WBC # BLD AUTO: 18.86 K/UL (ref 3.9–12.7)

## 2019-07-06 PROCEDURE — 94640 AIRWAY INHALATION TREATMENT: CPT

## 2019-07-06 PROCEDURE — 83880 ASSAY OF NATRIURETIC PEPTIDE: CPT

## 2019-07-06 PROCEDURE — 93010 ELECTROCARDIOGRAM REPORT: CPT | Mod: ,,, | Performed by: INTERNAL MEDICINE

## 2019-07-06 PROCEDURE — 94660 CPAP INITIATION&MGMT: CPT

## 2019-07-06 PROCEDURE — 63600175 PHARM REV CODE 636 W HCPCS

## 2019-07-06 PROCEDURE — 99233 SBSQ HOSP IP/OBS HIGH 50: CPT | Mod: ,,, | Performed by: INTERNAL MEDICINE

## 2019-07-06 PROCEDURE — 93010 EKG 12-LEAD: ICD-10-PCS | Mod: ,,, | Performed by: INTERNAL MEDICINE

## 2019-07-06 PROCEDURE — 99900035 HC TECH TIME PER 15 MIN (STAT)

## 2019-07-06 PROCEDURE — 25000003 PHARM REV CODE 250

## 2019-07-06 PROCEDURE — 27000221 HC OXYGEN, UP TO 24 HOURS

## 2019-07-06 PROCEDURE — 85025 COMPLETE CBC W/AUTO DIFF WBC: CPT

## 2019-07-06 PROCEDURE — 25000003 PHARM REV CODE 250: Performed by: STUDENT IN AN ORGANIZED HEALTH CARE EDUCATION/TRAINING PROGRAM

## 2019-07-06 PROCEDURE — 27000190 HC CPAP FULL FACE MASK W/VALVE

## 2019-07-06 PROCEDURE — 63600175 PHARM REV CODE 636 W HCPCS: Performed by: STUDENT IN AN ORGANIZED HEALTH CARE EDUCATION/TRAINING PROGRAM

## 2019-07-06 PROCEDURE — 25000242 PHARM REV CODE 250 ALT 637 W/ HCPCS

## 2019-07-06 PROCEDURE — 93005 ELECTROCARDIOGRAM TRACING: CPT

## 2019-07-06 PROCEDURE — 99233 PR SUBSEQUENT HOSPITAL CARE,LEVL III: ICD-10-PCS | Mod: ,,, | Performed by: INTERNAL MEDICINE

## 2019-07-06 PROCEDURE — 20600001 HC STEP DOWN PRIVATE ROOM

## 2019-07-06 PROCEDURE — 84100 ASSAY OF PHOSPHORUS: CPT

## 2019-07-06 PROCEDURE — 94761 N-INVAS EAR/PLS OXIMETRY MLT: CPT

## 2019-07-06 PROCEDURE — 80053 COMPREHEN METABOLIC PANEL: CPT

## 2019-07-06 PROCEDURE — 85730 THROMBOPLASTIN TIME PARTIAL: CPT

## 2019-07-06 PROCEDURE — 83735 ASSAY OF MAGNESIUM: CPT

## 2019-07-06 RX ORDER — PREDNISONE 5 MG/1
5 TABLET ORAL 2 TIMES DAILY
Status: DISCONTINUED | OUTPATIENT
Start: 2019-07-06 | End: 2019-07-08

## 2019-07-06 RX ORDER — FUROSEMIDE 10 MG/ML
40 INJECTION INTRAMUSCULAR; INTRAVENOUS ONCE
Status: COMPLETED | OUTPATIENT
Start: 2019-07-06 | End: 2019-07-06

## 2019-07-06 RX ORDER — MORPHINE SULFATE 2 MG/ML
0.5 INJECTION, SOLUTION INTRAMUSCULAR; INTRAVENOUS ONCE
Status: COMPLETED | OUTPATIENT
Start: 2019-07-06 | End: 2019-07-06

## 2019-07-06 RX ADMIN — MORPHINE SULFATE 0.5 MG: 2 INJECTION, SOLUTION INTRAMUSCULAR; INTRAVENOUS at 01:07

## 2019-07-06 RX ADMIN — PREDNISONE 5 MG: 5 TABLET ORAL at 04:07

## 2019-07-06 RX ADMIN — METOPROLOL TARTRATE 25 MG: 25 TABLET, FILM COATED ORAL at 08:07

## 2019-07-06 RX ADMIN — FUROSEMIDE 40 MG: 10 INJECTION, SOLUTION INTRAVENOUS at 08:07

## 2019-07-06 RX ADMIN — IPRATROPIUM BROMIDE AND ALBUTEROL SULFATE 3 ML: .5; 3 SOLUTION RESPIRATORY (INHALATION) at 07:07

## 2019-07-06 RX ADMIN — CALCIUM CARBONATE (ANTACID) CHEW TAB 500 MG 500 MG: 500 CHEW TAB at 08:07

## 2019-07-06 RX ADMIN — ACETAMINOPHEN 325 MG: 325 TABLET ORAL at 04:07

## 2019-07-06 RX ADMIN — CALCIUM CARBONATE (ANTACID) CHEW TAB 500 MG 500 MG: 500 CHEW TAB at 04:07

## 2019-07-06 RX ADMIN — IPRATROPIUM BROMIDE AND ALBUTEROL SULFATE 3 ML: .5; 3 SOLUTION RESPIRATORY (INHALATION) at 12:07

## 2019-07-06 RX ADMIN — POLYETHYLENE GLYCOL 3350 17 G: 17 POWDER, FOR SOLUTION ORAL at 08:07

## 2019-07-06 RX ADMIN — HEPARIN SODIUM AND DEXTROSE 12 UNITS/KG/HR: 10000; 5 INJECTION INTRAVENOUS at 10:07

## 2019-07-06 RX ADMIN — FUROSEMIDE 40 MG: 10 INJECTION, SOLUTION INTRAMUSCULAR; INTRAVENOUS at 12:07

## 2019-07-06 RX ADMIN — GUAIFENESIN 600 MG: 600 TABLET, EXTENDED RELEASE ORAL at 08:07

## 2019-07-06 RX ADMIN — PANTOPRAZOLE SODIUM 40 MG: 40 TABLET, DELAYED RELEASE ORAL at 08:07

## 2019-07-06 RX ADMIN — AMIODARONE HYDROCHLORIDE 400 MG: 200 TABLET ORAL at 08:07

## 2019-07-06 RX ADMIN — PREDNISONE 5 MG: 5 TABLET ORAL at 08:07

## 2019-07-06 RX ADMIN — ROSUVASTATIN CALCIUM 20 MG: 20 TABLET, FILM COATED ORAL at 08:07

## 2019-07-06 NOTE — ASSESSMENT & PLAN NOTE
CHADS-VASc 7 - 15.7% risk of stroke/TIA/systemic embolism  Continue amiodarone 400mg BID, metoprolol tartrate 25mg BID  Went into Afib on 7/6/19, HR <115. On heparin gtt.

## 2019-07-06 NOTE — ASSESSMENT & PLAN NOTE
50+ year smoking history; on home O2. Received nebs and solu-medrol at OSH.     Plan:  - duo-nebs prn  - oxygen support with NC, goal Sat 88%  - BIPAP QHS and prn  - steroid taper

## 2019-07-06 NOTE — NURSING TRANSFER
Nursing Transfer Note      7/6/2019     Transfer to     Transfer via stretcher    Transfer with  to O2 and tele    Transported by 2 CMICU RNs    Medicines sent: inhaler     Chart send with patient: Yes    Notified: spouse, daughter    Patient reassessed at: 7/6/2019 at 1250    Upon arrival to floor: patient oriented to room, call bell in reach and bed in lowest position. CSU RN at bedside

## 2019-07-06 NOTE — PROGRESS NOTES
[facility]  Cardiology  Progress Note    Patient Name: Wiley Alfred  MRN: [id number]  Admission Date: 7/5/2019  Hospital Length of Stay: 1 days  Code Status: Full Code   Attending Physician: Aaron Bacon MD   Primary Care Physician: Daniel Almeida MD  Expected Discharge Date:   Principal Problem:Acute on chronic heart failure    Subjective:   Chief complaint: SOB    HPI:  Patient is 74 y.o. M w/ aortic stenosis, CAD, COPD on home O2, CVA, pAF (eliquis, amiodarone) presented to [facility] w/ acute onset of SOB. Patient was found to be hypertensive  and in hypoxia and hypercapnia respiratory failure requiring nitroglycerin gtt and BIPAP. Received lasix and nebs with solu-medrol with improvement. Patient received MANUEL which showed severe MR, moderate AS and TR with preserved LV function. He was evaluated by CTS, was deemed high risk as a result of the severe COPD; so was transferred to [facility] for further evaluation of possible MVR/TVR/AVR vs TAVR/MV clip in a stable condition by [facility].     Prior to this admission patient was admitted on 6/23/19  Where he underwent LHC which revealed severe single-vessel CAD s/p PCI; patent stented RCA, moderate AS.     Hospital Course:   No notes on file    Interval History: Went into Afib w/ RVR overnight. One episode of gasping for air while sleeping. Episode of epigastric discomfort while eating lunch which resolved spontaneously after 15 minutes. EKG without new ST changes. Stepped down today. Pending TTE. Working on getting MANUEL result from [facility].     Review of Systems   Constitution: Negative for chills and fever.   HENT: Negative for congestion and sore throat.    Eyes: Negative for photophobia and visual disturbance.   Cardiovascular: Positive for dyspnea on exertion. Negative for chest pain, irregular heartbeat, palpitations and syncope.   Respiratory: Positive for cough, shortness of breath and sputum production.    Skin:  Negative for itching and rash.   Musculoskeletal: Negative for falls.   Gastrointestinal: Negative for constipation, diarrhea, nausea and vomiting.   Genitourinary: Negative for dysuria and flank pain.   Neurological: Negative for dizziness and headaches.   Psychiatric/Behavioral: Negative for altered mental status and memory loss.     Objective:     Vital Signs (Most Recent):  Temp: 96 °F (35.6 °C) (07/06/19 1256)  Pulse: 106 (07/06/19 1256)  Resp: 20 (07/06/19 1256)  BP: 103/64 (07/06/19 1256)  SpO2: (!) 88 % (07/06/19 1256) Vital Signs (24h Range):  Temp:  [96 °F (35.6 °C)-98 °F (36.7 °C)] 96 °F (35.6 °C)  Pulse:  [] 106  Resp:  [17-37] 20  SpO2:  [88 %-97 %] 88 %  BP: ()/(52-79) 103/64     Weight: 89.8 kg (198 lb)  There is no height or weight on file to calculate BMI.     SpO2: (!) 88 %  O2 Device (Oxygen Therapy): nasal cannula      Intake/Output Summary (Last 24 hours) at 7/6/2019 1354  Last data filed at 7/6/2019 1215  Gross per 24 hour   Intake 713.97 ml   Output 2770 ml   Net -2056.03 ml       Lines/Drains/Airways     Peripheral Intravenous Line                 Peripheral IV - Single Lumen 07/05/19 0100 20 G Distal;Left;Posterior Forearm 1 day         Peripheral IV - Single Lumen 07/05/19 1300 20 G Posterior;Right Hand 1 day         Peripheral IV - Single Lumen 07/05/19 2200 22 G Anterior;Left Upper Arm less than 1 day                Physical Exam   Constitutional: He is oriented to person, place, and time. No distress.   HENT:   Head: Normocephalic and atraumatic.   Mouth/Throat: Oropharynx is clear and moist.   Eyes: Right eye exhibits no discharge. Left eye exhibits no discharge. No scleral icterus.   Neck: JVD present.   Cardiovascular: Normal rate and regular rhythm.   Murmur heard.  Pulmonary/Chest: Effort normal. No stridor. No respiratory distress. He has wheezes.   On 4L NC   Abdominal: Soft. Bowel sounds are normal. He exhibits no distension.   Musculoskeletal: He exhibits edema  (bilateral pitting edema to ankles). He exhibits no tenderness.   Neurological: He is alert and oriented to person, place, and time.   Skin: Skin is warm and dry. He is not diaphoretic.   Vitals reviewed.    Significant Labs:   CMP   Recent Labs   Lab 07/05/19  1153 07/06/19  0342    142   K 4.6 3.9    100   CO2 28 29    84   BUN 79* 78*   CREATININE 2.6* 2.5*   CALCIUM 8.7 8.4*   PROT 5.7* 5.9*   ALBUMIN 3.2* 3.1*   BILITOT 0.6 0.7   ALKPHOS 59 61   AST 19 20   ALT 38 39   ANIONGAP 12 13   ESTGFRAFRICA 26.7* 28.0*   EGFRNONAA 23.1* 24.2*   , CBC   Recent Labs   Lab 07/05/19  1153 07/06/19  0342   WBC 19.26* 18.86*  18.86*   HGB 7.6* 8.0*  8.0*   HCT 27.1* 28.3*  28.3*    194  194    and All pertinent lab results from the last 24 hours have been reviewed.    Significant Imaging:     CXR:  FINDINGS:  Single portable chest view is submitted, leads overlie the patient.  The cardiomediastinal silhouette appears prominent, in part exaggerated by rotation and technique although likely baseline cardiac prominence.  There is chronic appearing accentuated pulmonary bronchovascular markings with superimposed pattern thought to represent interstitial infiltrate with a pattern of superimposed alveolar infiltrate particularly at the lung bases and this is more prominent at the left lung base with partial obscuration of the left hemidiaphragm likely due to pleural fluid.  There is no evidence for significant pleural effusion on the right there is no evidence for pneumothorax.  The osseous structures demonstrate chronic change.      Impression       Bilateral interstitial and alveolar infiltrate with left pleural effusion noted.      Electronically signed by: Noe Burgess  Date: 07/06/2019  Time: 00:21     Assessment and Plan:     * Acute on chronic heart failure  74 y.o. M w/ CAD, COPD on home O2, HTN, CVA, pAF (eliquis, amiodarone) presented w/ acute onset of SOB concerning for ADHF and COPD  exacerbation. MANUEL found showed severe MR, moderate AS and TR with preserved LV function. Required BIPAP, nitroglycerin gtt and IV diuresis at OSH.     Plan:  - F/u CXR, BNP, electrolytes  - IV furosemide 40mg QD  - continue home lopressor 25mg BID, atorvastatin 40mg  - BIPAP QHS and when napping  - strict I/O  - daily weights  - stepped down 7/6/19    CAD (coronary artery disease)  Prior to this admission patient was admitted on 6/23/19  Where he underwent LHC which revealed severe single-vessel CAD s/p PCI; patent stented RCA, moderate AS.     Hypertension   at OSH requiring nitroglycerin gtt. [place] [facility] [place]     - holding home Norvasc for now    COPD exacerbation  50+ year smoking history; on home O2. Received nebs and solu-medrol at OSH.     Plan:  - duo-nebs prn  - oxygen support with NC, goal Sat 88%  - BIPAP QHS and prn  - steroid taper    Paroxysmal atrial fibrillation  CHADS-VASc 7 - 15.7% risk of stroke/TIA/systemic embolism  Continue amiodarone 400mg BID, metoprolol tartrate 25mg BID  Went into Afib on 7/6/19, HR <115. On heparin gtt.     Severe aortic stenosis  TTE at OSH showed severe MR, moderate AS and TR with preserved LV function. [place] [facility] [place] of possible MVR/TVR/AVR vs TAVR/MV clip in a stable condition by [facility].     Plan:  - Consult CTS and Interventional Cardiology for options      VTE Risk Mitigation (From admission, onward)        Ordered     heparin 25,000 units in dextrose 5% 250 mL (100 units/mL) infusion LOW INTENSITY nomogram - [facility]  Continuous      07/05/19 1152     heparin 25,000 units in dextrose 5% (100 units/ml) IV bolus from bag - ADDITIONAL PRN BOLUS - 30 units/kg  As needed (PRN)      07/05/19 1152     heparin 25,000 units in dextrose 5% (100 units/ml) IV bolus from bag - ADDITIONAL PRN BOLUS - 60 units/kg  As needed (PRN)      07/05/19 1152     IP VTE HIGH RISK PATIENT  Once      07/05/19 1148          Cierra Mullen,  MD  Cardiology  [facility]

## 2019-07-06 NOTE — PLAN OF CARE
Problem: Adult Inpatient Plan of Care  Goal: Patient-Specific Goal (Individualization)  Outcome: Ongoing (interventions implemented as appropriate)  Updated care plan w/ pt.  Address all issues throughout shift. Patient progressing towards goals as tolerated, no falls during shift. Sacral dressing reinforce, heel dressings to bilateral feet.  Pt continue on a heparin infusion at 9.1ml/hr. Spouse involved in care. .

## 2019-07-06 NOTE — SUBJECTIVE & OBJECTIVE
Interval History: Went into Afib w/ RVR overnight. One episode of gasping for air while sleeping. Episode of epigastric discomfort while eating lunch which resolved spontaneously after 15 minutes. EKG without new ST changes. Stepped down today. Pending TTE. Working on getting MANUEL result from [facility].     Review of Systems   Constitution: Negative for chills and fever.   HENT: Negative for congestion and sore throat.    Eyes: Negative for photophobia and visual disturbance.   Cardiovascular: Positive for dyspnea on exertion. Negative for chest pain, irregular heartbeat, palpitations and syncope.   Respiratory: Positive for cough, shortness of breath and sputum production.    Skin: Negative for itching and rash.   Musculoskeletal: Negative for falls.   Gastrointestinal: Negative for constipation, diarrhea, nausea and vomiting.   Genitourinary: Negative for dysuria and flank pain.   Neurological: Negative for dizziness and headaches.   Psychiatric/Behavioral: Negative for altered mental status and memory loss.     Objective:     Vital Signs (Most Recent):  Temp: 96 °F (35.6 °C) (07/06/19 1256)  Pulse: 106 (07/06/19 1256)  Resp: 20 (07/06/19 1256)  BP: 103/64 (07/06/19 1256)  SpO2: (!) 88 % (07/06/19 1256) Vital Signs (24h Range):  Temp:  [96 °F (35.6 °C)-98 °F (36.7 °C)] 96 °F (35.6 °C)  Pulse:  [] 106  Resp:  [17-37] 20  SpO2:  [88 %-97 %] 88 %  BP: ()/(52-79) 103/64     Weight: 89.8 kg (198 lb)  There is no height or weight on file to calculate BMI.     SpO2: (!) 88 %  O2 Device (Oxygen Therapy): nasal cannula      Intake/Output Summary (Last 24 hours) at 7/6/2019 1354  Last data filed at 7/6/2019 1215  Gross per 24 hour   Intake 713.97 ml   Output 2770 ml   Net -2056.03 ml       Lines/Drains/Airways     Peripheral Intravenous Line                 Peripheral IV - Single Lumen 07/05/19 0100 20 G Distal;Left;Posterior Forearm 1 day         Peripheral IV - Single Lumen 07/05/19 1300 20 G  Posterior;Right Hand 1 day         Peripheral IV - Single Lumen 07/05/19 2200 22 G Anterior;Left Upper Arm less than 1 day                Physical Exam   Constitutional: He is oriented to person, place, and time. No distress.   HENT:   Head: Normocephalic and atraumatic.   Mouth/Throat: Oropharynx is clear and moist.   Eyes: Right eye exhibits no discharge. Left eye exhibits no discharge. No scleral icterus.   Neck: JVD present.   Cardiovascular: Normal rate and regular rhythm.   Murmur heard.  Pulmonary/Chest: Effort normal. No stridor. No respiratory distress. He has wheezes.   On 4L NC   Abdominal: Soft. Bowel sounds are normal. He exhibits no distension.   Musculoskeletal: He exhibits edema (bilateral pitting edema to ankles). He exhibits no tenderness.   Neurological: He is alert and oriented to person, place, and time.   Skin: Skin is warm and dry. He is not diaphoretic.   Vitals reviewed.    Significant Labs:   CMP   Recent Labs   Lab 07/05/19  1153 07/06/19  0342    142   K 4.6 3.9    100   CO2 28 29    84   BUN 79* 78*   CREATININE 2.6* 2.5*   CALCIUM 8.7 8.4*   PROT 5.7* 5.9*   ALBUMIN 3.2* 3.1*   BILITOT 0.6 0.7   ALKPHOS 59 61   AST 19 20   ALT 38 39   ANIONGAP 12 13   ESTGFRAFRICA 26.7* 28.0*   EGFRNONAA 23.1* 24.2*   , CBC   Recent Labs   Lab 07/05/19  1153 07/06/19  0342   WBC 19.26* 18.86*  18.86*   HGB 7.6* 8.0*  8.0*   HCT 27.1* 28.3*  28.3*    194  194    and All pertinent lab results from the last 24 hours have been reviewed.    Significant Imaging:     CXR:  FINDINGS:  Single portable chest view is submitted, leads overlie the patient.  The cardiomediastinal silhouette appears prominent, in part exaggerated by rotation and technique although likely baseline cardiac prominence.  There is chronic appearing accentuated pulmonary bronchovascular markings with superimposed pattern thought to represent interstitial infiltrate with a pattern of superimposed alveolar  infiltrate particularly at the lung bases and this is more prominent at the left lung base with partial obscuration of the left hemidiaphragm likely due to pleural fluid.  There is no evidence for significant pleural effusion on the right there is no evidence for pneumothorax.  The osseous structures demonstrate chronic change.      Impression       Bilateral interstitial and alveolar infiltrate with left pleural effusion noted.      Electronically signed by: Noe Burgess  Date: 07/06/2019  Time: 00:21

## 2019-07-06 NOTE — NURSING TRANSFER
Nursing Transfer Note      7/6/2019     Transfer FROM [facility]    Transfer via bed    Transfer with cardiac monitoring    Transported by2 [facility] RN    Medicines sent: NONE    Chart send with patient: Yes    Notified: spouse AT BEDSIDE        Upon arrival to floor: cardiac monitor applied, patient oriented to room, call bell in reach and bed in lowest position, BED ALARM ACTIVATED.  Admit from previous floor incomplete by nurse

## 2019-07-06 NOTE — PLAN OF CARE
Problem: Adult Inpatient Plan of Care  Goal: Plan of Care Review  Outcome: Ongoing (interventions implemented as appropriate)  See vital signs and assessments in flowsheets. See below for updates on today's progress.     Pulmonary: changed to venti mask 35% from 4L nasal cannula last night, Achieving SpO2 of >/= 92%; had sudden onset of increasing SOB; seen by Dr Anderson immediately, treated with Lasix and Morphine. Already had breathing treatment at the time.    Cardiovascular: On NSR/sinus nkechi until around 0100 went to Afib 105-110 bpm; -110.    Neurological: AAOx4 ; PERRLA ; afebrile.    Gastrointestinal: no bowel movement overnight ; cardiac diet, 1500 ml FR.    Genitourinary: spontaneously voids    Endocrine: no BG check ordered.    Integumentary/Other: Bruises on both arms; generally pale    Infusions: Heparin    Patient progressing towards goals as tolerated, plan of care communicated and reviewed with Wiley Alfred and his spouse. All concerns addressed. Will continue to monitor.

## 2019-07-06 NOTE — ASSESSMENT & PLAN NOTE
at OSH requiring nitroglycerin gtt. Normotensive since transfer to [facility].     - holding home Norvasc for now

## 2019-07-06 NOTE — ASSESSMENT & PLAN NOTE
74 y.o. M w/ CAD, COPD on home O2, HTN, CVA, pAF (eliquis, amiodarone) presented w/ acute onset of SOB concerning for ADHF and COPD exacerbation. MANUEL found showed severe MR, moderate AS and TR with preserved LV function. Required BIPAP, nitroglycerin gtt and IV diuresis at OSH.     Plan:  - F/u CXR, BNP, electrolytes  - IV furosemide 40mg QD  - continue home lopressor 25mg BID, atorvastatin 40mg  - BIPAP QHS and when napping  - strict I/O  - daily weights  - stepped down 7/6/19

## 2019-07-06 NOTE — ASSESSMENT & PLAN NOTE
TTE at OSH showed severe MR, moderate AS and TR with preserved LV function. [place] [facility] [place] of possible MVR/TVR/AVR vs TAVR/MV clip in a stable condition by [facility].     Plan:  - Consult CTS and Interventional Cardiology for options

## 2019-07-07 LAB
ALBUMIN SERPL BCP-MCNC: 3.1 G/DL (ref 3.5–5.2)
ALP SERPL-CCNC: 58 U/L (ref 55–135)
ALT SERPL W/O P-5'-P-CCNC: 31 U/L (ref 10–44)
ANION GAP SERPL CALC-SCNC: 9 MMOL/L (ref 8–16)
APTT BLDCRRT: 45.8 SEC (ref 21–32)
AST SERPL-CCNC: 15 U/L (ref 10–40)
BASOPHILS # BLD AUTO: 0 K/UL (ref 0–0.2)
BASOPHILS NFR BLD: 0 % (ref 0–1.9)
BILIRUB SERPL-MCNC: 0.8 MG/DL (ref 0.1–1)
BUN SERPL-MCNC: 57 MG/DL (ref 8–23)
CALCIUM SERPL-MCNC: 8.9 MG/DL (ref 8.7–10.5)
CHLORIDE SERPL-SCNC: 100 MMOL/L (ref 95–110)
CO2 SERPL-SCNC: 32 MMOL/L (ref 23–29)
CREAT SERPL-MCNC: 1.9 MG/DL (ref 0.5–1.4)
DIFFERENTIAL METHOD: ABNORMAL
EOSINOPHIL # BLD AUTO: 0 K/UL (ref 0–0.5)
EOSINOPHIL NFR BLD: 0.3 % (ref 0–8)
ERYTHROCYTE [DISTWIDTH] IN BLOOD BY AUTOMATED COUNT: 20.5 % (ref 11.5–14.5)
EST. GFR  (AFRICAN AMERICAN): 39 ML/MIN/1.73 M^2
EST. GFR  (NON AFRICAN AMERICAN): 33.7 ML/MIN/1.73 M^2
GLUCOSE SERPL-MCNC: 114 MG/DL (ref 70–110)
HCT VFR BLD AUTO: 28.1 % (ref 40–54)
HGB BLD-MCNC: 8 G/DL (ref 14–18)
IMM GRANULOCYTES # BLD AUTO: 0.12 K/UL (ref 0–0.04)
IMM GRANULOCYTES NFR BLD AUTO: 1.1 % (ref 0–0.5)
LYMPHOCYTES # BLD AUTO: 1.1 K/UL (ref 1–4.8)
LYMPHOCYTES NFR BLD: 10.2 % (ref 18–48)
MAGNESIUM SERPL-MCNC: 2.7 MG/DL (ref 1.6–2.6)
MCH RBC QN AUTO: 19.6 PG (ref 27–31)
MCHC RBC AUTO-ENTMCNC: 28.5 G/DL (ref 32–36)
MCV RBC AUTO: 69 FL (ref 82–98)
MONOCYTES # BLD AUTO: 0.8 K/UL (ref 0.3–1)
MONOCYTES NFR BLD: 7.6 % (ref 4–15)
NEUTROPHILS # BLD AUTO: 8.8 K/UL (ref 1.8–7.7)
NEUTROPHILS NFR BLD: 80.8 % (ref 38–73)
NRBC BLD-RTO: 0 /100 WBC
PHOSPHATE SERPL-MCNC: 3.8 MG/DL (ref 2.7–4.5)
PLATELET # BLD AUTO: 162 K/UL (ref 150–350)
PMV BLD AUTO: 11.7 FL (ref 9.2–12.9)
POTASSIUM SERPL-SCNC: 4.2 MMOL/L (ref 3.5–5.1)
PROT SERPL-MCNC: 5.8 G/DL (ref 6–8.4)
RBC # BLD AUTO: 4.08 M/UL (ref 4.6–6.2)
SODIUM SERPL-SCNC: 141 MMOL/L (ref 136–145)
WBC # BLD AUTO: 10.86 K/UL (ref 3.9–12.7)

## 2019-07-07 PROCEDURE — 25000242 PHARM REV CODE 250 ALT 637 W/ HCPCS

## 2019-07-07 PROCEDURE — 99233 SBSQ HOSP IP/OBS HIGH 50: CPT | Mod: ,,, | Performed by: INTERNAL MEDICINE

## 2019-07-07 PROCEDURE — 25000003 PHARM REV CODE 250: Performed by: STUDENT IN AN ORGANIZED HEALTH CARE EDUCATION/TRAINING PROGRAM

## 2019-07-07 PROCEDURE — 63600175 PHARM REV CODE 636 W HCPCS: Performed by: STUDENT IN AN ORGANIZED HEALTH CARE EDUCATION/TRAINING PROGRAM

## 2019-07-07 PROCEDURE — 20600001 HC STEP DOWN PRIVATE ROOM

## 2019-07-07 PROCEDURE — 99900035 HC TECH TIME PER 15 MIN (STAT)

## 2019-07-07 PROCEDURE — 97165 OT EVAL LOW COMPLEX 30 MIN: CPT

## 2019-07-07 PROCEDURE — 99233 PR SUBSEQUENT HOSPITAL CARE,LEVL III: ICD-10-PCS | Mod: ,,, | Performed by: INTERNAL MEDICINE

## 2019-07-07 PROCEDURE — 93005 ELECTROCARDIOGRAM TRACING: CPT

## 2019-07-07 PROCEDURE — 36415 COLL VENOUS BLD VENIPUNCTURE: CPT

## 2019-07-07 PROCEDURE — 94761 N-INVAS EAR/PLS OXIMETRY MLT: CPT

## 2019-07-07 PROCEDURE — 97161 PT EVAL LOW COMPLEX 20 MIN: CPT

## 2019-07-07 PROCEDURE — 84100 ASSAY OF PHOSPHORUS: CPT

## 2019-07-07 PROCEDURE — 93010 ELECTROCARDIOGRAM REPORT: CPT | Mod: ,,, | Performed by: INTERNAL MEDICINE

## 2019-07-07 PROCEDURE — 25000003 PHARM REV CODE 250

## 2019-07-07 PROCEDURE — 94640 AIRWAY INHALATION TREATMENT: CPT

## 2019-07-07 PROCEDURE — 63600175 PHARM REV CODE 636 W HCPCS

## 2019-07-07 PROCEDURE — 85025 COMPLETE CBC W/AUTO DIFF WBC: CPT

## 2019-07-07 PROCEDURE — 80053 COMPREHEN METABOLIC PANEL: CPT

## 2019-07-07 PROCEDURE — 93010 EKG 12-LEAD: ICD-10-PCS | Mod: ,,, | Performed by: INTERNAL MEDICINE

## 2019-07-07 PROCEDURE — 94660 CPAP INITIATION&MGMT: CPT

## 2019-07-07 PROCEDURE — 27000221 HC OXYGEN, UP TO 24 HOURS

## 2019-07-07 PROCEDURE — 85730 THROMBOPLASTIN TIME PARTIAL: CPT

## 2019-07-07 PROCEDURE — 25000003 PHARM REV CODE 250: Performed by: FAMILY MEDICINE

## 2019-07-07 PROCEDURE — 83735 ASSAY OF MAGNESIUM: CPT

## 2019-07-07 RX ORDER — ROSUVASTATIN CALCIUM 20 MG/1
20 TABLET, COATED ORAL NIGHTLY
Status: DISCONTINUED | OUTPATIENT
Start: 2019-07-07 | End: 2019-07-12 | Stop reason: HOSPADM

## 2019-07-07 RX ORDER — CALCIUM CARBONATE 200(500)MG
500 TABLET,CHEWABLE ORAL 3 TIMES DAILY PRN
Status: DISCONTINUED | OUTPATIENT
Start: 2019-07-07 | End: 2019-07-12 | Stop reason: HOSPADM

## 2019-07-07 RX ADMIN — AMIODARONE HYDROCHLORIDE 400 MG: 200 TABLET ORAL at 10:07

## 2019-07-07 RX ADMIN — GUAIFENESIN 600 MG: 600 TABLET, EXTENDED RELEASE ORAL at 09:07

## 2019-07-07 RX ADMIN — PREDNISONE 5 MG: 5 TABLET ORAL at 09:07

## 2019-07-07 RX ADMIN — FUROSEMIDE 40 MG: 10 INJECTION, SOLUTION INTRAVENOUS at 09:07

## 2019-07-07 RX ADMIN — POLYETHYLENE GLYCOL 3350 17 G: 17 POWDER, FOR SOLUTION ORAL at 09:07

## 2019-07-07 RX ADMIN — ROSUVASTATIN CALCIUM 20 MG: 20 TABLET, FILM COATED ORAL at 10:07

## 2019-07-07 RX ADMIN — PREDNISONE 5 MG: 5 TABLET ORAL at 10:07

## 2019-07-07 RX ADMIN — ACETAMINOPHEN 325 MG: 325 TABLET ORAL at 04:07

## 2019-07-07 RX ADMIN — METOPROLOL TARTRATE 25 MG: 25 TABLET, FILM COATED ORAL at 10:07

## 2019-07-07 RX ADMIN — ONDANSETRON 4 MG: 2 INJECTION INTRAMUSCULAR; INTRAVENOUS at 07:07

## 2019-07-07 RX ADMIN — AMIODARONE HYDROCHLORIDE 400 MG: 200 TABLET ORAL at 09:07

## 2019-07-07 RX ADMIN — CALCIUM CARBONATE (ANTACID) CHEW TAB 500 MG 500 MG: 500 CHEW TAB at 11:07

## 2019-07-07 RX ADMIN — IPRATROPIUM BROMIDE AND ALBUTEROL SULFATE 3 ML: .5; 3 SOLUTION RESPIRATORY (INHALATION) at 04:07

## 2019-07-07 RX ADMIN — METOPROLOL TARTRATE 25 MG: 25 TABLET, FILM COATED ORAL at 09:07

## 2019-07-07 RX ADMIN — PANTOPRAZOLE SODIUM 40 MG: 40 TABLET, DELAYED RELEASE ORAL at 09:07

## 2019-07-07 RX ADMIN — CALCIUM CARBONATE (ANTACID) CHEW TAB 500 MG 500 MG: 500 CHEW TAB at 07:07

## 2019-07-07 RX ADMIN — CALCIUM CARBONATE (ANTACID) CHEW TAB 500 MG 500 MG: 500 CHEW TAB at 04:07

## 2019-07-07 RX ADMIN — HEPARIN SODIUM AND DEXTROSE 12 UNITS/KG/HR: 10000; 5 INJECTION INTRAVENOUS at 04:07

## 2019-07-07 NOTE — PROGRESS NOTES
[facility]  Cardiology  Progress Note    Patient Name: Wiley Alfred  MRN: [id number]  Admission Date: 7/5/2019  Hospital Length of Stay: 2 days  Code Status: Full Code   Attending Physician: Aaron Bacon MD   Primary Care Physician: Daniel Almeida MD  Expected Discharge Date:   Principal Problem:Acute on chronic heart failure    Subjective:   Chief complaint: SOB    HPI:  Patient is 74 y.o. M w/ aortic stenosis, CAD, COPD on home O2, CVA, pAF (eliquis, amiodarone) presented to [facility] w/ acute onset of SOB. Patient was found to be hypertensive  and in hypoxia and hypercapnia respiratory failure requiring nitroglycerin gtt and BIPAP. Received lasix and nebs with solu-medrol with improvement. Patient received MANUEL which showed severe MR, moderate AS and TR with preserved LV function. He was evaluated by CTS, was deemed high risk as a result of the severe COPD; so was transferred to [facility] [place] for further evaluation of possible MVR/TVR/AVR vs TAVR/MV clip in a stable condition by [facility].     Prior to this admission patient was admitted on 6/23/19  Where he underwent LHC which revealed severe single-vessel CAD s/p PCI; patent stented RCA, moderate AS.     Hospital Course:   Transferred from [facility] for surgical/interventional cardiology evaluation for severe AS, mitral and tricuspid regurgitation, as well as ongoing management of ADHF and COPD exacerbation.     CTS evaluated patient, not recommending surgical option due to high STS risk.     Interval History: NAEON. Evaluated by Interventional Cardiology, not recommending surgical intervention due to high STS risk. Will get Interventional Cardiology evaluation.     Review of Systems   Constitution: Negative for chills and fever.   HENT: Negative for congestion and sore throat.    Eyes: Negative for photophobia and visual disturbance.   Cardiovascular: Positive for dyspnea on exertion. Negative for chest pain, irregular  heartbeat, palpitations and syncope.   Respiratory: Positive for cough, shortness of breath and sputum production.    Skin: Negative for itching and rash.   Musculoskeletal: Negative for falls.   Gastrointestinal: Negative for constipation, diarrhea, nausea and vomiting.   Genitourinary: Negative for dysuria and flank pain.   Neurological: Negative for dizziness and headaches.   Psychiatric/Behavioral: Negative for altered mental status and memory loss.     Objective:     Vital Signs (Most Recent):  Temp: 98.2 °F (36.8 °C) (07/07/19 1132)  Pulse: (!) 119 (07/07/19 1501)  Resp: 18 (07/07/19 1205)  BP: 90/66 (07/07/19 1132)  SpO2: 97 % (07/07/19 1205) Vital Signs (24h Range):  Temp:  [97.3 °F (36.3 °C)-98.6 °F (37 °C)] 98.2 °F (36.8 °C)  Pulse:  [] 119  Resp:  [16-24] 18  SpO2:  [92 %-99 %] 97 %  BP: ()/(58-79) 90/66     Weight: 83.1 kg (183 lb 3.2 oz)  Body mass index is 28.69 kg/m².     SpO2: 97 %  O2 Device (Oxygen Therapy): nasal cannula      Intake/Output Summary (Last 24 hours) at 7/7/2019 1524  Last data filed at 7/7/2019 1100  Gross per 24 hour   Intake 634.7 ml   Output 2125 ml   Net -1490.3 ml       Lines/Drains/Airways     Peripheral Intravenous Line                 Peripheral IV - Single Lumen 07/05/19 2200 22 G Anterior;Left Upper Arm 1 day         Peripheral IV - Single Lumen 07/06/19 1800 22 G Posterior;Right Hand less than 1 day                Physical Exam   Constitutional: He is oriented to person, place, and time. No distress.   HENT:   Head: Normocephalic and atraumatic.   Mouth/Throat: Oropharynx is clear and moist.   Eyes: Right eye exhibits no discharge. Left eye exhibits no discharge. No scleral icterus.   Neck: JVD present.   Cardiovascular: Normal rate and regular rhythm.   Murmur heard.  Pulmonary/Chest: Effort normal. No stridor. No respiratory distress. He has wheezes.   On NC   Abdominal: Soft. Bowel sounds are normal. He exhibits no distension.   Musculoskeletal: He exhibits  edema (bilateral pitting edema to ankles). He exhibits no tenderness.   Neurological: He is alert and oriented to person, place, and time.   Skin: Skin is warm and dry. He is not diaphoretic.   Vitals reviewed.    Significant Labs:   CMP   Recent Labs   Lab 07/06/19 0342 07/07/19 0412    141   K 3.9 4.2    100   CO2 29 32*   GLU 84 114*   BUN 78* 57*   CREATININE 2.5* 1.9*   CALCIUM 8.4* 8.9   PROT 5.9* 5.8*   ALBUMIN 3.1* 3.1*   BILITOT 0.7 0.8   ALKPHOS 61 58   AST 20 15   ALT 39 31   ANIONGAP 13 9   ESTGFRAFRICA 28.0* 39.0*   EGFRNONAA 24.2* 33.7*   , CBC   Recent Labs   Lab 07/06/19 0342 07/07/19 0412   WBC 18.86*  18.86* 10.86   HGB 8.0*  8.0* 8.0*   HCT 28.3*  28.3* 28.1*     194 162    and All pertinent lab results from the last 24 hours have been reviewed.    Significant Imaging:     CXR:  Impression       Bilateral pulmonary vascular prominence, areas of opacity likely relating to infiltrate and small pleural effusion at the left lung base, the overall pattern is that of progression, follow-up to document resolution as discussed above is recommended.      Electronically signed by: Noe Burgess  Date: 07/07/2019  Time: 08:05         Assessment and Plan:     * Acute on chronic heart failure  74 y.o. M w/ CAD, COPD on home O2, HTN, CVA, pAF (eliquis, amiodarone) presented w/ acute onset of SOB concerning for ADHF and COPD exacerbation. MANUEL found showed severe MR, moderate AS and TR with preserved LV function. Required BIPAP, nitroglycerin gtt and IV diuresis at OSH.     Plan:  - F/u CXR, BNP, electrolytes  - IV furosemide 40mg QD  - continue home lopressor 25mg BID, atorvastatin 40mg  - BIPAP QHS and when napping  - strict I/O  - daily weights  - stepped down 7/6/19    CAD (coronary artery disease)  Prior to this admission patient was admitted on 6/23/19  Where he underwent LHC which revealed severe single-vessel CAD s/p PCI; patent stented RCA, moderate AS.     Hypertension  SBP  180 at OSH requiring nitroglycerin gtt. Normotensive since transfer to [facility] [place].     - holding home Norvasc for now    COPD exacerbation  50+ year smoking history; on home O2. Received nebs and solu-medrol at OSH.     Plan:  - duo-nebs prn  - oxygen support with NC, goal Sat 88%  - BIPAP QHS and prn  - steroid taper    Paroxysmal atrial fibrillation  CHADS-VASc 7 - 15.7% risk of stroke/TIA/systemic embolism  Continue amiodarone 400mg BID, metoprolol tartrate 25mg BID  Went into Afib on 7/6/19, HR <115. On heparin gtt.     Severe aortic stenosis  TTE at OSH showed severe MR, moderate AS and TR with preserved LV function. Patient is transferred to [facility] [place] for further evaluation of possible MVR/TVR/AVR vs TAVR/MV clip in a stable condition by [facility].     Plan:  - No surgical option as patient is high STS score  - Pending Interventional Cardiology evaluation      VTE Risk Mitigation (From admission, onward)        Ordered     heparin 25,000 units in dextrose 5% 250 mL (100 units/mL) infusion LOW INTENSITY nomogram - [facility]  Continuous      07/05/19 1152     heparin 25,000 units in dextrose 5% (100 units/ml) IV bolus from bag - ADDITIONAL PRN BOLUS - 30 units/kg  As needed (PRN)      07/05/19 1152     heparin 25,000 units in dextrose 5% (100 units/ml) IV bolus from bag - ADDITIONAL PRN BOLUS - 60 units/kg  As needed (PRN)      07/05/19 1152     IP VTE HIGH RISK PATIENT  Once      07/05/19 1148          Cierra Mullen MD  Cardiology  [facility]

## 2019-07-07 NOTE — NURSING
Off monitor    Received verbal order from dr driver that it was ok to  Pause heparin for pt can shower, pt was off the monitor for 1 hr.

## 2019-07-07 NOTE — PLAN OF CARE
Problem: Physical Therapy Goal  Goal: Physical Therapy Goal  Goals to be met by: 19     Patient will increase functional independence with mobility by performin. Supine to sit with Supervision  2. Sit to stand transfer with Supervision  3. Gait  x 150 feet with Supervision using AD as needed.  4. Lower extremity exercise program x15 reps, with supervision, in order to increase LE strength and (I) with functional mobility.       Outcome: Ongoing (interventions implemented as appropriate)  PT evaluation complete and appropriate goals established.    Yana Del Cid, PT, DPT   2019  [phone]

## 2019-07-07 NOTE — CONSULTS
"[facility]  Adult Nutrition  Consult Note    RD received consult for "patient identified as at risk of developing a pressure injury". Albumin and prealbumin should not be used as indicators of nutritional status. Patient on Cardiac diet with good PO intake. If patient should remain hospitalized 10 days, RD will follow-up and assess. Please reconsult as needed.  "

## 2019-07-07 NOTE — HOSPITAL COURSE
Transferred from [facility] for surgical/interventional cardiology evaluation for severe AS, mitral and tricuspid regurgitation, as well as ongoing management of ADHF and COPD exacerbation. IV furosemide 40mg QD given with adequate diuresis. Patient went into Afib w/ RVR on hospital day 2 after apneic episode overnight; received BIPAP QHS and was converted back to NSR. His amiodarone and metoprolol tartrate were continued from OSH. On hospital day 6, amiodarone dose was lowered to 200mg QD.     Patient developed worsening leukocytosis while CXR showed pleural effusion and possible consolidation in RLL. Concerning for aspiration vs HAP, so vanc/zosyn/azithro started 7/10/19. Sputum culture was positive for stenotrophomonas, so abx changed to minocycline, with plan to complete 4-week course. Patient will f/u with ID clinic in [place].     CTS evaluated patient, not recommending surgical option due to high STS risk. Evaluated by Interventional Cardiology, MANUEL showed severe mitral regurgitation. EROA is 0.29 cm2. Plan to follow-up outpatient, tentatively planning for TAVR after clearance by Infectious Disease.    TTE 7/10/19:  · Normal appearing left atrial appendage. No thrombus is present in the appendage.  · Normal left ventricular systolic function. The estimated ejection fraction is 60%.  · There is mild leaflet calcification of the Mitral Valve.  · Severe mitral regurgitation. EROA is 0.29 cm2. Regurgitant volume is 59 mL. Systolic reversal seen in the right upper pulmonary vein.  · Dense calcification of aortic valve. Visually TYLER looks severly stenotic. LVOT measurement is 2.4 x 2.5 cm with area 4.2 cm2, perimeter 7.4 cm.  · Mild to Moderate aortic regurgitation. Eccentrically directed jet.  · Normal right ventricular systolic function.  · Moderate to severe tricuspid regurgitation.  · Moderate right atrial enlargement.  · Pulmonary hypertension present. The estimated PA systolic pressure is 60  mmHg.  · Grade 3 plaque present.  · No interatrial septal defect present.    Vitals:    07/12/19 1228   BP:    Pulse: 63   Resp: 20   Temp:      Physical Exam   Constitutional: He is oriented to person, place, and time. No distress.   HENT:   Head: Normocephalic and atraumatic.   Mouth/Throat: Oropharynx is clear and moist.   Eyes: Right eye exhibits no discharge. Left eye exhibits no discharge. No scleral icterus.   Neck: No JVD present.   Cardiovascular: Normal rate and regular rhythm.   Murmur heard.  Pulmonary/Chest: Effort normal. No stridor. No respiratory distress. He has wheezes.   On NC   Abdominal: Soft. Bowel sounds are normal. He exhibits no distension.   Musculoskeletal: He exhibits no edema or tenderness.   Neurological: He is alert and oriented to person, place, and time.   Skin: Skin is warm and dry. He is not diaphoretic.   Vitals reviewed.

## 2019-07-07 NOTE — ASSESSMENT & PLAN NOTE
Mr. Alfred is a pleasant 74 y.o. M with PMHx of aortic stenosis, CAD s/p PCI, home O2-dependent COPD, CVA, pAF (eliquis, amiodarone), and stage III CKD who presents as a transfer from outside hospital with an acute exacerbation of his COPD and heart failure secondary to severe AS, severe MR, mod-severe TR, with pulmonary hypertension (60mmHg). Though the patient is being diuresed, is improving, and has weaned down to nasal cannula, his STS risk for an isolated AVR + single-vessel CABG is 22%. This patient is underserved by this risk, as model calculation does not take into account his severe pulmonary hypertension, and need for mitral (and likely) tricuspid valve repair/replacement. The presence of these comorbidities put him at a much higher risk due to a long bypass run to repair multiple valves with already the presence of CKD stage III. We discussed with family likelihood of prolonged or permanent mechanical ventilation given home-O2 COPD, and high chance of dialysis requirement. At this time, we are unable to offer surgical interventions to this patient. Agree with continued workup for PCI/TAVR/Theresa-clip workup.

## 2019-07-07 NOTE — HPI
Mr. Alfred is a pleasant 74 y.o. M with PMHx of aortic stenosis, CAD s/p PCI, home O2-dependent COPD, CVA, pAF (eliquis, amiodarone), and stage III CKD who presented to [facility]/ acute onset of SOB. Hewas found to be hypertensive  and in hypoxic and hypercapnic respiratory failure requiring nitroglycerin gtt and BIPAP. He received lasix and nebs with solu-medrol with improvement. A MANUEL showed severe MR, moderate AS and TR with preserved LV function. He was evaluated by the cardiac surgeons there and was deemed high risk as a result of the severe COPD. He was subsequently transferred to [facility] for further evaluation of possible MVR/TVR/AVR vs TAVR/MV clip in a stable condition. CTS is consulted for surgical repair/replacement evaluation     Of note, he has been admitted to the hospital 2 previous times in June for severe respiratory distress without the need for intubation. He underwent LHC on his 6/23/19 admission which revealed severe single-vessel CAD s/p PCI; patent stented RCA, and moderate AS. This morning he has weaned off BiPAP to regular NC, has been walking around the floor with PT/OT, and is downtrending on renal function labs.

## 2019-07-07 NOTE — NURSING
Resume care from previous nurse, pt voice no complaints, lying in bed w/ bed in lowest position and locked.  Spouse is at bedside. Call bell within reach, introduce myself to pt. Will continue to monitor pt status heparin infusing at 12 units/hr by pump.

## 2019-07-07 NOTE — CONSULTS
[facility]  Cardiothoracic Surgery  Consult Note    Patient Name: Wiley Alfred  MRN: [id number]  Admission Date: 7/5/2019  Attending Physician: Aaron Bacon MD  Referring Provider: Tommie Javed MD    Patient information was obtained from patient, spouse/SO, past medical records and ER records.     Inpatient consult to Cardiothoracic Surgery  Consult performed by: Jojo Jurado MD  Consult ordered by: Javier Tellez MD  Reason for consult: AS/MR/TR  Assessment/Recommendations: Mr. Alfred is a pleasant 74 y.o. M with PMHx of aortic stenosis, CAD s/p PCI, home O2-dependent COPD, CVA, pAF (eliquis, amiodarone), and stage III CKD who presents as a transfer from outside hospital with an acute exacerbation of his COPD and heart failure secondary to severe AS, severe MR, mod-severe TR, with pulmonary hypertension (60mmHg). Though the patient is being diuresed, is improving, and has weaned down to nasal cannula, his STS risk for an isolated AVR + single-vessel CABG is 22%. This patient is underserved by this risk, as model calculation does not take into account his severe pulmonary hypertension, and need for mitral (and likely) tricuspid valve repair/replacement. The presence of these comorbidities put him at a much higher risk due to a long bypass run to repair multiple valves with already the presence of CKD stage III. We discussed with family likelihood of prolonged or permanent mechanical ventilation given home-O2 COPD, and high chance of dialysis requirement. At this time, we are unable to offer surgical interventions to this patient. Agree with continued workup for PCI/TAVR/Theresa-clip workup.        Subjective:     Principal Problem: Acute on chronic heart failure    History of Present Illness: Mr. Alfred is a pleasant 74 y.o. M with PMHx of aortic stenosis, CAD s/p PCI, home O2-dependent COPD, CVA, pAF (eliquis, amiodarone), and stage III CKD who presented to [facility] w/ acute onset of SOB. Hewas found to be hypertensive  and in hypoxic and hypercapnic respiratory failure requiring nitroglycerin gtt and BIPAP. He received lasix and nebs with solu-medrol with improvement. A MANUEL showed severe MR, moderate AS and TR with preserved LV function. He was evaluated by the cardiac surgeons there and was deemed high risk as a result of the severe COPD. He was subsequently transferred to [facility] for further evaluation of possible MVR/TVR/AVR vs TAVR/MV clip in a stable condition. CTS is consulted for surgical repair/replacement evaluation     Of note, he has been admitted to the hospital 2 previous times in June for severe respiratory distress without the need for intubation. He underwent LHC on his 6/23/19 admission which revealed severe single-vessel CAD s/p PCI; patent stented RCA, and moderate AS. This morning he has weaned off BiPAP to regular NC, has been walking around the floor with PT/OT, and is downtrending on renal function labs.      Review of Systems   Constitution: Negative for chills and fever.   HENT: Negative for congestion and sore throat.    Eyes: Negative for photophobia and visual disturbance.   Cardiovascular: Positive for dyspnea on exertion. Negative for chest pain, irregular heartbeat, palpitations and syncope.   Respiratory: Positive for cough, shortness of breath and sputum production.    Skin: Negative for itching and rash.   Musculoskeletal: Negative for falls.   Gastrointestinal: Negative for constipation, diarrhea, nausea and vomiting.   Genitourinary: Negative for dysuria and flank pain.   Neurological: Negative for dizziness and headaches.   Psychiatric/Behavioral: Negative for altered mental status and memory loss.     Objective:     Vital Signs (Most Recent):  Temp: 98.2 °F (36.8 °C) (07/07/19 1132)  Pulse: 101 (07/07/19 1132)  Resp: 18 (07/07/19 1132)  BP: 90/66 (07/07/19 1132)  SpO2: (!) 92 % (07/07/19 1132) Vital Signs (24h Range):  Temp:   [97 °F (36.1 °C)-98.6 °F (37 °C)] 98.2 °F (36.8 °C)  Pulse:  [] 101  Resp:  [16-24] 18  SpO2:  [92 %-99 %] 92 %  BP: ()/(56-79) 90/66     Weight: 83.1 kg (183 lb 3.2 oz)  Body mass index is 28.69 kg/m².     SpO2: (!) 92 %  O2 Device (Oxygen Therapy): BiPAP      Intake/Output Summary (Last 24 hours) at 7/7/2019 1358  Last data filed at 7/7/2019 1100  Gross per 24 hour   Intake 634.7 ml   Output 2125 ml   Net -1490.3 ml       Lines/Drains/Airways     Peripheral Intravenous Line                 Peripheral IV - Single Lumen 07/05/19 2200 22 G Anterior;Left Upper Arm 1 day         Peripheral IV - Single Lumen 07/06/19 1800 22 G Posterior;Right Hand less than 1 day                Physical Exam   Constitutional: He is oriented to person, place, and time. No distress.   HENT:   Head: Normocephalic and atraumatic.   Mouth/Throat: Oropharynx is clear and moist.   Eyes: Right eye exhibits no discharge. Left eye exhibits no discharge. No scleral icterus.   Neck: JVD present.   Cardiovascular: Normal rate and regular rhythm.   Murmur heard.  Pulmonary/Chest: Effort normal. No stridor. No respiratory distress. He has wheezes.   On 4L NC   Abdominal: Soft. Bowel sounds are normal. He exhibits no distension.   Musculoskeletal: He exhibits edema (bilateral pitting edema to ankles). He exhibits no tenderness.   Neurological: He is alert and oriented to person, place, and time.   Skin: Skin is warm and dry. He is not diaphoretic.   Vitals reviewed.    Significant Labs:   CMP   Recent Labs   Lab 07/06/19  0342 07/07/19  0412    141   K 3.9 4.2    100   CO2 29 32*   GLU 84 114*   BUN 78* 57*   CREATININE 2.5* 1.9*   CALCIUM 8.4* 8.9   PROT 5.9* 5.8*   ALBUMIN 3.1* 3.1*   BILITOT 0.7 0.8   ALKPHOS 61 58   AST 20 15   ALT 39 31   ANIONGAP 13 9   ESTGFRAFRICA 28.0* 39.0*   EGFRNONAA 24.2* 33.7*   , CBC   Recent Labs   Lab 07/06/19  0342 07/07/19  0412   WBC 18.86*  18.86* 10.86   HGB 8.0*  8.0* 8.0*   HCT 28.3*   28.3* 28.1*     194 162    and All pertinent lab results from the last 24 hours have been reviewed.    STS Risk Score:   Risk of Mortality: 21.972%  Renal Failure: 41.821%  Permanent Stroke: 7.272%  Prolonged Ventilation: 68.373%  DSW Infection: 0.485%  Reoperation: 9.151%  Morbidity or Mortality: 81.682%  Short Length of Stay: 4.509%  Long Length of Stay: 57.113%    Significant Imaging:       X-ray Chest Ap Portable 7/7/2019  -bilateral pulmonary vascular prominence, areas of opacity likely relating to infiltrate and small pleural effusion at the left lung base, the overall pattern is that of progression    TTE 7/5/2019  · Mild left ventricular enlargement.  · Normal left ventricular systolic function. The estimated ejection fraction is 68%  · Indeterminate left ventricular diastolic function.  · Severe left atrial enlargement.  · Moderate right atrial enlargement.  · Moderate-to-severe aortic valve stenosis.  · Aortic valve area is 0.99 cm2; peak velocity is 4.14 m/s; mean gradient is 44 mmHg.  · Moderate-to-severe mitral regurgitation.  · Moderate to severe tricuspid regurgitation.  · Mild aortic regurgitation.  · The estimated PA systolic pressure is 61 mm Hg, pulmonary hypertension present    [facility] 6/23/2019  -single-vessel CAD s/p PCI  -patent stented RCA  -moderate AS    Assessment/Plan:     NYHA Score: NYHA IV: inability to carry on any physical activity without discomfort    Severe aortic stenosis  Mr. Alfred is a pleasant 74 y.o. M with PMHx of aortic stenosis, CAD s/p PCI, home O2-dependent COPD, CVA, pAF (eliquis, amiodarone), and stage III CKD who present s as a transfer from outside hospital with an acute exacerbation of his COPD and heart failure secondary to severe AS, severe MR, mod-severe TR, with pulmonary hypertension (60mmHg). Though the patient is being diuresed, is improving, and has weaned down to nasal cannula, his STS risk for an isolated AVR + single-vessel CABG is 22%. This patient  is underserved by this risk, as model calculation does not take into account his severe pulmonary hypertension, and need for mitral (and likely) tricuspid valve repair/replacement. The presence of these comorbidities put him at a much higher risk due to a long bypass run to repair multiple valves with already the presence of CKD stage III. We discussed with family likelihood of prolonged or permanent mechanical ventilation given home-O2 COPD, and high chance of dialysis requirement. At this time, we are unable to offer surgical interventions to this patient. Agree with continued workup for PCI/TAVR/Theresa-clip workup.        Thank you for your consult. I will sign off. Please contact us if you have any additional questions. Patient examined and discussed with attending cardiothoracic surgeon, Ronnell Pollock MD.    Jojo Jurado MD  Cardiothoracic Surgery  [facility]

## 2019-07-07 NOTE — PT/OT/SLP EVAL
Physical Therapy Evaluation    Patient Name:  Wiley Alfred   MRN:  [id number]    Recommendations:     Discharge Recommendations:  home health PT, home health OT   Discharge Equipment Recommendations: (TBD)   Barriers to discharge: None    Assessment:     Wiley Alfred is a 74 y.o. male admitted with a medical diagnosis of Acute on chronic heart failure.  He presents with the following impairments/functional limitations:  weakness, gait instability, impaired endurance, impaired balance, impaired cardiopulmonary response to activity, impaired self care skills, impaired functional mobilty. Pt able to complete transfers without physical assist. Required Nghia throughout gait for balance assist with pt demo'ing LOB x1 when initiating gait. Further gait distance limited 2* impaired endurance and generalized weakness. Pt would continue to benefit from skilled acute PT in order to address current deficits and progress functional mobility.     Rehab Prognosis: Good; patient would benefit from acute skilled PT services to address these deficits and reach maximum level of function.    Recent Surgery: * No surgery found *      Plan:     During this hospitalization, patient to be seen 4 x/week to address the identified rehab impairments via gait training, therapeutic activities, therapeutic exercises, neuromuscular re-education and progress toward the following goals:    · Plan of Care Expires:  08/05/19    Subjective     Chief Complaint: none noted   Patient/Family Comments/goals: return home  Pain/Comfort:  · Pain Rating 1: 0/10    Patients cultural, spiritual, Catholic conflicts given the current situation: no    Living Environment:  Pt lives with his wife in a 1SH with  to enter.   Prior to admission, patients level of function was independent, including driving.  Equipment used at home: cane, straight, shower chair, oxygen.  Upon discharge, patient will have assistance from wife.    Objective:     Communicated  with RN prior to session.  Patient found seated EOB with oxygen, telemetry, peripheral IV  upon PT entry to room.    General Precautions: Standard, fall   Orthopedic Precautions:N/A   Braces: N/A     Exams:  · Cognitive Exam:  Patient is oriented to Person, Place, Time and Situation  · Sensation:    · -       Intact  · RLE ROM: WFL  · RLE Strength: WFL  · LLE ROM: WFL  · LLE Strength: WFL    Functional Mobility:  · Transfers:     · Sit to Stand:  stand by assistance with no AD  · Gait: ~60 ft. with Nghia and HHA  · Gait instability, impaired weight-shifting ability, decreased williams, and decreased toe-floor clearance   · Cues for upright posture, forward gaze, and self-pacing  · demo'd LOB x1 when initiating gait, requiring Nghia for balance correction       Therapeutic Activities and Exercises:  Pt educated on role of PT and PT POC. Pt verbalized understanding.   Pt educated on importance of OOB activity and encouraged to sit UIC majority of day as tolerated. Pt oriented to call bell and instructed that he must have staff assist for standing tasks/transfers. Pt v/u.  RN notified of pt status and level of assist needed for transfers    AM-PAC 6 CLICK MOBILITY  Total Score:18     Patient left up in chair with all lines intact, call button in reach, RN notified and pt's wife present.    GOALS:   Multidisciplinary Problems     Physical Therapy Goals        Problem: Physical Therapy Goal    Goal Priority Disciplines Outcome Goal Variances Interventions   Physical Therapy Goal     PT, PT/OT Ongoing (interventions implemented as appropriate)     Description:  Goals to be met by: 19     Patient will increase functional independence with mobility by performin. Supine to sit with Supervision  2. Sit to stand transfer with Supervision  3. Gait  x 150 feet with Supervision using AD as needed.  4. Lower extremity exercise program x15 reps, with supervision, in order to increase LE strength and (I) with functional  mobility.                        History:     History reviewed. No pertinent past medical history.    History reviewed. No pertinent surgical history.    Time Tracking:     PT Received On: 07/07/19  PT Start Time: 1030     PT Stop Time: 1042  PT Total Time (min): 12 min     Billable Minutes: Evaluation 12  (co-eval with OT)    Yana Del Cid, PT, DPT   7/7/2019  [phone]

## 2019-07-07 NOTE — PLAN OF CARE
Problem: Adult Inpatient Plan of Care  Goal: Plan of Care Review  Outcome: Ongoing (interventions implemented as appropriate)  Pt free of falls/traumas/injuries. Skin remains clean, dry, and intact. Pt continued on heparin gtt, aptt therapeutic. Pt continued on O2. Pt still in afib 100-115.  Pt re-educated on importance of measuring accurate intake and out put; pt verbalized and demonstrates understanding. Reviewed plan of care with pt; and pt verbalized understanding.  Pt AAOX4, VSS, and  in no distress will continue to monitor.

## 2019-07-07 NOTE — PT/OT/SLP EVAL
"Occupational Therapy   Evaluation    Name: Wiley Alfred  MRN: [id number]  Admitting Diagnosis:  Acute on chronic heart failure      Recommendations:     Discharge Recommendations: home health OT, home health PT  Discharge Equipment Recommendations:  none  Barriers to discharge:       Assessment:     Wiley Alfred is a 74 y.o. male with a medical diagnosis of Acute on chronic heart failure.  Pt presents with decreased endurance and impaired mobility performance as limited by cardiovascular status and generalized weakness. Pt pleasant during OT/PT evaluation explaining he lives at home with wife in 1 story home with 1 threshold to ambulate to enter/exit home. Pt explains prior to admission, pt was (I) with ADLs and functional mobility. Pt demonstrated bed mobility with SBA and mobility with min (A) as pt with noted LOB upon standing with HHA. Pt with decreased endurance and evident fatigue during hallway ambulation with portable 02. Performance deficits affecting function: weakness, impaired endurance, impaired self care skills, impaired functional mobilty, gait instability, impaired balance, decreased coordination, decreased lower extremity function, decreased safety awareness.  Pt would benefit from continued OT skilled services 3x/wk to improve daily living skills to optimize QOL.Pt is recommended to discharge to OT at this time.      Rehab Prognosis: Fair; patient would benefit from acute skilled OT services to address these deficits and reach maximum level of function.       Plan:     Patient to be seen 3 x/week to address the above listed problems via self-care/home management, therapeutic activities, therapeutic exercises  · Plan of Care Expires: 08/07/19  · Plan of Care Reviewed with: patient, spouse    Subjective     Chief Complaint: Shortness of breath  Patient/Family Comments/goals: "I enjoy hunting so I'm trying to get all of this medical stuff out of the way when I can"    Occupational " Profile:  Living Environment: Pt lives with wife in 1 story home with 1 DIMITRI. Pt has walk-in shower with shower seat for bathing.  Previous level of function: (I) with ADLs and functional mobility. Pt still driving.  Roles and Routines: [employer], .  Equipment Used at Home:  shower chair, oxygen, cane, straight  Assistance upon Discharge: Spouse available 24-hr if needed.    Pain/Comfort:  · Pain Rating 1: 0/10  · Pain Rating Post-Intervention 1: 0/10  · Pain Rating Post-Intervention 2: 0/10    Patients cultural, spiritual, Taoist conflicts given the current situation: no    Objective:     Communicated with: RN prior to session.  Patient found sitting EOB  with oxygen, peripheral IV, telemetry upon OT entry to room.    General Precautions: Standard, fall   Orthopedic Precautions:N/A   Braces: N/A     Occupational Performance:    Bed Mobility:    · Patient completed Scooting/Bridging with stand by assistance    Functional Mobility/Transfers:  · Patient completed Sit <> Stand Transfer with minimum assistance  with  hand-held assist   · Patient completed Bed <> Chair Transfer using Step Transfer technique with minimum assistance with hand-held assist  · Functional Mobility: Pt completed bedroom and hallway ambulation with noted fatigue requiring min (A). Pt had 1 LOB upon standing requiring additional time to self-correct.     Activities of Daily Living:  · Upper Body Dressing: minimum assistance donning gown at EOB     Cognitive/Visual Perceptual:  Cognitive/Psychosocial Skills:     -       Oriented to: Person, Place, Time and Situation   -       Follows Commands/attention:Follows multistep  commands  -       Communication: clear/fluent  -       Memory: No Deficits noted  -       Safety awareness/insight to disability: impaired   -       Mood/Affect/Coping skills/emotional control: Appropriate to situation and Pleasant    Physical Exam:  Balance:    -       Demo good static sitting balance and fair  standing balance requiring HHA  Dominant hand:    -       Right  Upper Extremity Range of Motion:     -       Right Upper Extremity: WFL  -       Left Upper Extremity: WFL  Upper Extremity Strength:    -       Right Upper Extremity: WFL  -       Left Upper Extremity: WFL   Strength:    -       Right Upper Extremity: WFL  -       Left Upper Extremity: WFL    AMPAC 6 Click ADL:  AMPAC Total Score: 20    Treatment & Education:  Pt educated on role of occupational therapy, POC, and safety during ADLs and functional mobility. Pt and OT discussed importance of safe, continued mobility to optimize daily living skills. Pt verbalized understanding. White board updated during session. Pt given instruction to call for medical staff/nurse for assistance.      Education:    Patient left up in chair with all lines intact, call button in reach, nurse notified and spouse present    GOALS:   Multidisciplinary Problems     Occupational Therapy Goals        Problem: Occupational Therapy Goal    Goal Priority Disciplines Outcome Interventions   Occupational Therapy Goal     OT, PT/OT Ongoing (interventions implemented as appropriate)    Description:  Goals to be met by: 7/15/19     Patient will increase functional independence with ADLs by performing:    UE Dressing with Set-up Assistance.  LE Dressing with Set-up Assistance.  Grooming while standing with Supervision.  Toileting from toilet with Supervision for hygiene and clothing management.   Rolling to Bilateral with Supervision.   Supine to sit with Supervision.  Toilet transfer to toilet with Supervision.                      History:     History reviewed. No pertinent past medical history.    History reviewed. No pertinent surgical history.    Time Tracking:     OT Date of Treatment: 07/07/19  OT Start Time: 1027  OT Stop Time: 1043  OT Total Time (min): 16 min    Billable Minutes:Evaluation 16 min    Deedee Coker OT  7/7/2019

## 2019-07-07 NOTE — PLAN OF CARE
Problem: Adult Inpatient Plan of Care  Goal: Plan of Care Review  Outcome: Ongoing (interventions implemented as appropriate)  Updated care plan w/ pt.  Address all issues throughout shift. Patient progressing towards goals as tolerated, no falls during shift. Sacral dressing reinforce, heel dressings to bilateral feet.  Pt continue on a heparin infusion at 9.1ml/hr.  Pt is in and out of a. Fib   In the 110's and sr in the 70's/.  Spouse involved in care. .

## 2019-07-07 NOTE — PLAN OF CARE
Problem: Occupational Therapy Goal  Goal: Occupational Therapy Goal  Goals to be met by: 7/15/19     Patient will increase functional independence with ADLs by performing:    UE Dressing with Set-up Assistance.  LE Dressing with Set-up Assistance.  Grooming while standing with Supervision.  Toileting from toilet with Supervision for hygiene and clothing management.   Rolling to Bilateral with Supervision.   Supine to sit with Supervision.  Toilet transfer to toilet with Supervision.    Outcome: Ongoing (interventions implemented as appropriate)  Evaluated pt and established OT POC. Continue as tolerated.  Deedee Coker OT  7/7/2019

## 2019-07-07 NOTE — SUBJECTIVE & OBJECTIVE
Review of Systems   Constitution: Negative for chills and fever.   HENT: Negative for congestion and sore throat.    Eyes: Negative for photophobia and visual disturbance.   Cardiovascular: Positive for dyspnea on exertion. Negative for chest pain, irregular heartbeat, palpitations and syncope.   Respiratory: Positive for cough, shortness of breath and sputum production.    Skin: Negative for itching and rash.   Musculoskeletal: Negative for falls.   Gastrointestinal: Negative for constipation, diarrhea, nausea and vomiting.   Genitourinary: Negative for dysuria and flank pain.   Neurological: Negative for dizziness and headaches.   Psychiatric/Behavioral: Negative for altered mental status and memory loss.     Objective:     Vital Signs (Most Recent):  Temp: 98.2 °F (36.8 °C) (07/07/19 1132)  Pulse: 101 (07/07/19 1132)  Resp: 18 (07/07/19 1132)  BP: 90/66 (07/07/19 1132)  SpO2: (!) 92 % (07/07/19 1132) Vital Signs (24h Range):  Temp:  [97 °F (36.1 °C)-98.6 °F (37 °C)] 98.2 °F (36.8 °C)  Pulse:  [] 101  Resp:  [16-24] 18  SpO2:  [92 %-99 %] 92 %  BP: ()/(56-79) 90/66     Weight: 83.1 kg (183 lb 3.2 oz)  Body mass index is 28.69 kg/m².     SpO2: (!) 92 %  O2 Device (Oxygen Therapy): BiPAP      Intake/Output Summary (Last 24 hours) at 7/7/2019 1358  Last data filed at 7/7/2019 1100  Gross per 24 hour   Intake 634.7 ml   Output 2125 ml   Net -1490.3 ml       Lines/Drains/Airways     Peripheral Intravenous Line                 Peripheral IV - Single Lumen 07/05/19 2200 22 G Anterior;Left Upper Arm 1 day         Peripheral IV - Single Lumen 07/06/19 1800 22 G Posterior;Right Hand less than 1 day                Physical Exam   Constitutional: He is oriented to person, place, and time. No distress.   HENT:   Head: Normocephalic and atraumatic.   Mouth/Throat: Oropharynx is clear and moist.   Eyes: Right eye exhibits no discharge. Left eye exhibits no discharge. No scleral icterus.   Neck: JVD present.    Cardiovascular: Normal rate and regular rhythm.   Murmur heard.  Pulmonary/Chest: Effort normal. No stridor. No respiratory distress. He has wheezes.   On 4L NC   Abdominal: Soft. Bowel sounds are normal. He exhibits no distension.   Musculoskeletal: He exhibits edema (bilateral pitting edema to ankles). He exhibits no tenderness.   Neurological: He is alert and oriented to person, place, and time.   Skin: Skin is warm and dry. He is not diaphoretic.   Vitals reviewed.    Significant Labs:   CMP   Recent Labs   Lab 07/06/19 0342 07/07/19 0412    141   K 3.9 4.2    100   CO2 29 32*   GLU 84 114*   BUN 78* 57*   CREATININE 2.5* 1.9*   CALCIUM 8.4* 8.9   PROT 5.9* 5.8*   ALBUMIN 3.1* 3.1*   BILITOT 0.7 0.8   ALKPHOS 61 58   AST 20 15   ALT 39 31   ANIONGAP 13 9   ESTGFRAFRICA 28.0* 39.0*   EGFRNONAA 24.2* 33.7*   , CBC   Recent Labs   Lab 07/06/19 0342 07/07/19 0412   WBC 18.86*  18.86* 10.86   HGB 8.0*  8.0* 8.0*   HCT 28.3*  28.3* 28.1*     194 162    and All pertinent lab results from the last 24 hours have been reviewed.    STS Risk Score:   Risk of Mortality: 21.972%  Renal Failure: 41.821%  Permanent Stroke: 7.272%  Prolonged Ventilation: 68.373%  DSW Infection: 0.485%  Reoperation: 9.151%  Morbidity or Mortality: 81.682%  Short Length of Stay: 4.509%  Long Length of Stay: 57.113%    Significant Imaging:       X-ray Chest Ap Portable 7/7/2019  -bilateral pulmonary vascular prominence, areas of opacity likely relating to infiltrate and small pleural effusion at the left lung base, the overall pattern is that of progression    TTE 7/5/2019  · Mild left ventricular enlargement.  · Normal left ventricular systolic function. The estimated ejection fraction is 68%  · Indeterminate left ventricular diastolic function.  · Severe left atrial enlargement.  · Moderate right atrial enlargement.  · Moderate-to-severe aortic valve stenosis.  · Aortic valve area is 0.99 cm2; peak velocity is 4.14  m/s; mean gradient is 44 mmHg.  · Moderate-to-severe mitral regurgitation.  · Moderate to severe tricuspid regurgitation.  · Mild aortic regurgitation.  · The estimated PA systolic pressure is 61 mm Hg, pulmonary hypertension present    [facility] [place] 6/23/2019  -single-vessel CAD s/p PCI  -patent stented RCA  -moderate AS

## 2019-07-07 NOTE — CARE UPDATE
Rapid Response Nurse Chart Check     Received AI alert for patient with elevated Mews score. Chart check completed, abnormal VS noted, bedside RNCarmela contacted, no concerns   verbalized at this time, instructed to call [phone] for further concerns or assistance.

## 2019-07-07 NOTE — ASSESSMENT & PLAN NOTE
TTE at OSH showed severe MR, moderate AS and TR with preserved LV function. Patient is transferred to [facility] for further evaluation of possible MVR/TVR/AVR vs TAVR/MV clip in a stable condition by [facility].     Plan:  - No surgical option as patient is high STS score  - Pending Interventional Cardiology evaluation

## 2019-07-07 NOTE — ASSESSMENT & PLAN NOTE
at OSH requiring nitroglycerin gtt. Normotensive since [place] [facility].     - holding home Norvasc for now

## 2019-07-07 NOTE — SUBJECTIVE & OBJECTIVE
Interval History: NAEON. Evaluated by Interventional Cardiology, not recommending surgical intervention due to high STS risk. Will get Interventional Cardiology evaluation.     Review of Systems   Constitution: Negative for chills and fever.   HENT: Negative for congestion and sore throat.    Eyes: Negative for photophobia and visual disturbance.   Cardiovascular: Positive for dyspnea on exertion. Negative for chest pain, irregular heartbeat, palpitations and syncope.   Respiratory: Positive for cough, shortness of breath and sputum production.    Skin: Negative for itching and rash.   Musculoskeletal: Negative for falls.   Gastrointestinal: Negative for constipation, diarrhea, nausea and vomiting.   Genitourinary: Negative for dysuria and flank pain.   Neurological: Negative for dizziness and headaches.   Psychiatric/Behavioral: Negative for altered mental status and memory loss.     Objective:     Vital Signs (Most Recent):  Temp: 98.2 °F (36.8 °C) (07/07/19 1132)  Pulse: (!) 119 (07/07/19 1501)  Resp: 18 (07/07/19 1205)  BP: 90/66 (07/07/19 1132)  SpO2: 97 % (07/07/19 1205) Vital Signs (24h Range):  Temp:  [97.3 °F (36.3 °C)-98.6 °F (37 °C)] 98.2 °F (36.8 °C)  Pulse:  [] 119  Resp:  [16-24] 18  SpO2:  [92 %-99 %] 97 %  BP: ()/(58-79) 90/66     Weight: 83.1 kg (183 lb 3.2 oz)  Body mass index is 28.69 kg/m².     SpO2: 97 %  O2 Device (Oxygen Therapy): nasal cannula      Intake/Output Summary (Last 24 hours) at 7/7/2019 1524  Last data filed at 7/7/2019 1100  Gross per 24 hour   Intake 634.7 ml   Output 2125 ml   Net -1490.3 ml       Lines/Drains/Airways     Peripheral Intravenous Line                 Peripheral IV - Single Lumen 07/05/19 2200 22 G Anterior;Left Upper Arm 1 day         Peripheral IV - Single Lumen 07/06/19 1800 22 G Posterior;Right Hand less than 1 day                Physical Exam   Constitutional: He is oriented to person, place, and time. No distress.   HENT:   Head: Normocephalic and  atraumatic.   Mouth/Throat: Oropharynx is clear and moist.   Eyes: Right eye exhibits no discharge. Left eye exhibits no discharge. No scleral icterus.   Neck: JVD present.   Cardiovascular: Normal rate and regular rhythm.   Murmur heard.  Pulmonary/Chest: Effort normal. No stridor. No respiratory distress. He has wheezes.   On NC   Abdominal: Soft. Bowel sounds are normal. He exhibits no distension.   Musculoskeletal: He exhibits edema (bilateral pitting edema to ankles). He exhibits no tenderness.   Neurological: He is alert and oriented to person, place, and time.   Skin: Skin is warm and dry. He is not diaphoretic.   Vitals reviewed.    Significant Labs:   CMP   Recent Labs   Lab 07/06/19  0342 07/07/19  0412    141   K 3.9 4.2    100   CO2 29 32*   GLU 84 114*   BUN 78* 57*   CREATININE 2.5* 1.9*   CALCIUM 8.4* 8.9   PROT 5.9* 5.8*   ALBUMIN 3.1* 3.1*   BILITOT 0.7 0.8   ALKPHOS 61 58   AST 20 15   ALT 39 31   ANIONGAP 13 9   ESTGFRAFRICA 28.0* 39.0*   EGFRNONAA 24.2* 33.7*   , CBC   Recent Labs   Lab 07/06/19  0342 07/07/19  0412   WBC 18.86*  18.86* 10.86   HGB 8.0*  8.0* 8.0*   HCT 28.3*  28.3* 28.1*     194 162    and All pertinent lab results from the last 24 hours have been reviewed.    Significant Imaging:     CXR:  Impression       Bilateral pulmonary vascular prominence, areas of opacity likely relating to infiltrate and small pleural effusion at the left lung base, the overall pattern is that of progression, follow-up to document resolution as discussed above is recommended.      Electronically signed by: Noe Burgess  Date: 07/07/2019  Time: 08:05

## 2019-07-08 PROBLEM — J90 PLEURAL EFFUSION: Status: ACTIVE | Noted: 2019-07-08

## 2019-07-08 PROBLEM — I34.0 NON-RHEUMATIC MITRAL REGURGITATION: Status: ACTIVE | Noted: 2019-07-08

## 2019-07-08 LAB
ALBUMIN SERPL BCP-MCNC: 2.9 G/DL (ref 3.5–5.2)
ALP SERPL-CCNC: 55 U/L (ref 55–135)
ALT SERPL W/O P-5'-P-CCNC: 27 U/L (ref 10–44)
ANION GAP SERPL CALC-SCNC: 9 MMOL/L (ref 8–16)
APTT BLDCRRT: 42.7 SEC (ref 21–32)
AST SERPL-CCNC: 15 U/L (ref 10–40)
BASOPHILS # BLD AUTO: 0 K/UL (ref 0–0.2)
BASOPHILS NFR BLD: 0 % (ref 0–1.9)
BILIRUB SERPL-MCNC: 0.7 MG/DL (ref 0.1–1)
BUN SERPL-MCNC: 52 MG/DL (ref 8–23)
CALCIUM SERPL-MCNC: 9 MG/DL (ref 8.7–10.5)
CHLORIDE SERPL-SCNC: 104 MMOL/L (ref 95–110)
CO2 SERPL-SCNC: 31 MMOL/L (ref 23–29)
CREAT SERPL-MCNC: 1.8 MG/DL (ref 0.5–1.4)
DIFFERENTIAL METHOD: ABNORMAL
EOSINOPHIL # BLD AUTO: 0.2 K/UL (ref 0–0.5)
EOSINOPHIL NFR BLD: 1.3 % (ref 0–8)
ERYTHROCYTE [DISTWIDTH] IN BLOOD BY AUTOMATED COUNT: 20.3 % (ref 11.5–14.5)
EST. GFR  (AFRICAN AMERICAN): 41.6 ML/MIN/1.73 M^2
EST. GFR  (NON AFRICAN AMERICAN): 36 ML/MIN/1.73 M^2
GLUCOSE SERPL-MCNC: 104 MG/DL (ref 70–110)
HCT VFR BLD AUTO: 29.2 % (ref 40–54)
HGB BLD-MCNC: 7.9 G/DL (ref 14–18)
IMM GRANULOCYTES # BLD AUTO: 0.11 K/UL (ref 0–0.04)
IMM GRANULOCYTES NFR BLD AUTO: 0.9 % (ref 0–0.5)
INR PPP: 1.1 (ref 0.8–1.2)
LYMPHOCYTES # BLD AUTO: 1.3 K/UL (ref 1–4.8)
LYMPHOCYTES NFR BLD: 10.3 % (ref 18–48)
MAGNESIUM SERPL-MCNC: 2.6 MG/DL (ref 1.6–2.6)
MCH RBC QN AUTO: 19.4 PG (ref 27–31)
MCHC RBC AUTO-ENTMCNC: 27.1 G/DL (ref 32–36)
MCV RBC AUTO: 72 FL (ref 82–98)
MONOCYTES # BLD AUTO: 0.8 K/UL (ref 0.3–1)
MONOCYTES NFR BLD: 6.1 % (ref 4–15)
NEUTROPHILS # BLD AUTO: 10.1 K/UL (ref 1.8–7.7)
NEUTROPHILS NFR BLD: 81.4 % (ref 38–73)
NRBC BLD-RTO: 0 /100 WBC
PHOSPHATE SERPL-MCNC: 3.4 MG/DL (ref 2.7–4.5)
PLATELET # BLD AUTO: 156 K/UL (ref 150–350)
PMV BLD AUTO: ABNORMAL FL (ref 9.2–12.9)
POTASSIUM SERPL-SCNC: 4.4 MMOL/L (ref 3.5–5.1)
PROT SERPL-MCNC: 5.8 G/DL (ref 6–8.4)
PROTHROMBIN TIME: 10.9 SEC (ref 9–12.5)
RBC # BLD AUTO: 4.07 M/UL (ref 4.6–6.2)
SODIUM SERPL-SCNC: 144 MMOL/L (ref 136–145)
WBC # BLD AUTO: 12.36 K/UL (ref 3.9–12.7)

## 2019-07-08 PROCEDURE — 20600001 HC STEP DOWN PRIVATE ROOM

## 2019-07-08 PROCEDURE — 94761 N-INVAS EAR/PLS OXIMETRY MLT: CPT

## 2019-07-08 PROCEDURE — 25000003 PHARM REV CODE 250

## 2019-07-08 PROCEDURE — 99223 1ST HOSP IP/OBS HIGH 75: CPT | Mod: ,,, | Performed by: INTERNAL MEDICINE

## 2019-07-08 PROCEDURE — 63600175 PHARM REV CODE 636 W HCPCS

## 2019-07-08 PROCEDURE — 99233 PR SUBSEQUENT HOSPITAL CARE,LEVL III: ICD-10-PCS | Mod: ,,, | Performed by: INTERNAL MEDICINE

## 2019-07-08 PROCEDURE — 87077 CULTURE AEROBIC IDENTIFY: CPT

## 2019-07-08 PROCEDURE — 87040 BLOOD CULTURE FOR BACTERIA: CPT

## 2019-07-08 PROCEDURE — 85025 COMPLETE CBC W/AUTO DIFF WBC: CPT

## 2019-07-08 PROCEDURE — 85730 THROMBOPLASTIN TIME PARTIAL: CPT

## 2019-07-08 PROCEDURE — 63600175 PHARM REV CODE 636 W HCPCS: Performed by: STUDENT IN AN ORGANIZED HEALTH CARE EDUCATION/TRAINING PROGRAM

## 2019-07-08 PROCEDURE — 84100 ASSAY OF PHOSPHORUS: CPT

## 2019-07-08 PROCEDURE — 80053 COMPREHEN METABOLIC PANEL: CPT

## 2019-07-08 PROCEDURE — 94660 CPAP INITIATION&MGMT: CPT

## 2019-07-08 PROCEDURE — 83735 ASSAY OF MAGNESIUM: CPT

## 2019-07-08 PROCEDURE — 87070 CULTURE OTHR SPECIMN AEROBIC: CPT

## 2019-07-08 PROCEDURE — 85610 PROTHROMBIN TIME: CPT

## 2019-07-08 PROCEDURE — 36415 COLL VENOUS BLD VENIPUNCTURE: CPT

## 2019-07-08 PROCEDURE — 99223 PR INITIAL HOSPITAL CARE,LEVL III: ICD-10-PCS | Mod: ,,, | Performed by: INTERNAL MEDICINE

## 2019-07-08 PROCEDURE — 94640 AIRWAY INHALATION TREATMENT: CPT

## 2019-07-08 PROCEDURE — 25000003 PHARM REV CODE 250: Performed by: FAMILY MEDICINE

## 2019-07-08 PROCEDURE — 87205 SMEAR GRAM STAIN: CPT

## 2019-07-08 PROCEDURE — 27000221 HC OXYGEN, UP TO 24 HOURS

## 2019-07-08 PROCEDURE — 97116 GAIT TRAINING THERAPY: CPT

## 2019-07-08 PROCEDURE — 99900035 HC TECH TIME PER 15 MIN (STAT)

## 2019-07-08 PROCEDURE — 99233 SBSQ HOSP IP/OBS HIGH 50: CPT | Mod: ,,, | Performed by: INTERNAL MEDICINE

## 2019-07-08 PROCEDURE — 87186 SC STD MICRODIL/AGAR DIL: CPT

## 2019-07-08 PROCEDURE — 25000242 PHARM REV CODE 250 ALT 637 W/ HCPCS

## 2019-07-08 RX ORDER — PREDNISONE 5 MG/1
5 TABLET ORAL DAILY
Status: DISCONTINUED | OUTPATIENT
Start: 2019-07-08 | End: 2019-07-12 | Stop reason: HOSPADM

## 2019-07-08 RX ADMIN — AMIODARONE HYDROCHLORIDE 400 MG: 200 TABLET ORAL at 09:07

## 2019-07-08 RX ADMIN — FUROSEMIDE 40 MG: 10 INJECTION, SOLUTION INTRAVENOUS at 09:07

## 2019-07-08 RX ADMIN — IPRATROPIUM BROMIDE AND ALBUTEROL SULFATE 3 ML: .5; 3 SOLUTION RESPIRATORY (INHALATION) at 10:07

## 2019-07-08 RX ADMIN — METOPROLOL TARTRATE 25 MG: 25 TABLET, FILM COATED ORAL at 09:07

## 2019-07-08 RX ADMIN — PREDNISONE 5 MG: 5 TABLET ORAL at 09:07

## 2019-07-08 RX ADMIN — IPRATROPIUM BROMIDE AND ALBUTEROL SULFATE 3 ML: .5; 3 SOLUTION RESPIRATORY (INHALATION) at 08:07

## 2019-07-08 RX ADMIN — HEPARIN SODIUM AND DEXTROSE 12 UNITS/KG/HR: 10000; 5 INJECTION INTRAVENOUS at 09:07

## 2019-07-08 RX ADMIN — ROSUVASTATIN CALCIUM 20 MG: 20 TABLET, FILM COATED ORAL at 09:07

## 2019-07-08 RX ADMIN — PANTOPRAZOLE SODIUM 40 MG: 40 TABLET, DELAYED RELEASE ORAL at 09:07

## 2019-07-08 RX ADMIN — GUAIFENESIN 600 MG: 600 TABLET, EXTENDED RELEASE ORAL at 09:07

## 2019-07-08 NOTE — PT/OT/SLP PROGRESS
"Physical Therapy Treatment    Patient Name:  Wiley Alfred   MRN:  [id number]    Recommendations:     Discharge Recommendations:  home health PT   Discharge Equipment Recommendations: walker, rolling, commode   Barriers to discharge: Inaccessible home 1 DIMITRI    Assessment:     Wiley Alfred is a 74 y.o. male admitted with a medical diagnosis of Acute on chronic heart failure.  He presents with the following impairments/functional limitations:  weakness, impaired functional mobilty, gait instability, impaired endurance, edema, impaired cardiopulmonary response to activity. Pt limited with gait due to SOB and L LE discomfort. Pt motivated to progress with functional mobility.    Rehab Prognosis: Good; patient would benefit from acute skilled PT services to address these deficits and reach maximum level of function.    Recent Surgery: * No surgery found *      Plan:     During this hospitalization, patient to be seen 4 x/week to address the identified rehab impairments via gait training, therapeutic activities, therapeutic exercises, neuromuscular re-education and progress toward the following goals:    · Plan of Care Expires:  08/05/19    Subjective   "My L leg feels a little swollen"    Pain/Comfort:  · Pain Rating 1: (pt c/o discomfort in L LE due to edema, did not grade)  · Location - Side 1: Left  · Location 1: leg  · Pain Addressed 1: Reposition, Cessation of Activity  · Pain Rating Post-Intervention 1: 0/10      Objective:     Communicated with nurse prior to session.  Patient found up in chair with oxygen, peripheral IV, telemetry upon PT entry to room.     General Precautions: Standard, fall   Orthopedic Precautions:N/A   Braces: N/A     Functional Mobility:  · Transfers:     · Sit to Stand:  contact guard assistance with no AD  · Gait: 100ft then 300ft with HHA with CGA with oxygen . Pt required 1 standing rest period during 1st gait trial and 2 standing rest periods during 2nd gait trial due to SOB. " pt performed gait with decreased stance phase on the L at times. PT attempted to take pt's oxygen sats during 2nd gait trial without success. Pt rested in sitting between gait trials.    AM-PAC 6 CLICK MOBILITY  Turning over in bed (including adjusting bedclothes, sheets and blankets)?: 3  Sitting down on and standing up from a chair with arms (e.g., wheelchair, bedside commode, etc.): 3  Moving from lying on back to sitting on the side of the bed?: 3  Moving to and from a bed to a chair (including a wheelchair)?: 3  Need to walk in hospital room?: 3  Climbing 3-5 steps with a railing?: 3  Basic Mobility Total Score: 18     Patient left up in chair with all lines intact, call button in reach, nurse notified and wife present..    GOALS:   Multidisciplinary Problems     Physical Therapy Goals        Problem: Physical Therapy Goal    Goal Priority Disciplines Outcome Goal Variances Interventions   Physical Therapy Goal     PT, PT/OT Ongoing (interventions implemented as appropriate)     Description:  Goals to be met by: 19     Patient will increase functional independence with mobility by performin. Supine to sit with Supervision  2. Sit to stand transfer with Supervision  3. Gait  x 150 feet with Supervision using AD as needed.  4. Lower extremity exercise program x15 reps, with supervision, in order to increase LE strength and (I) with functional mobility.                        Time Tracking:     PT Received On: 19  PT Start Time: 823     PT Stop Time: 848  PT Total Time (min): 25 min     Billable Minutes: Gait Training 25    Treatment Type: Treatment  PT/PTA: PT           Savanna Alvarez, PT  2019

## 2019-07-08 NOTE — PLAN OF CARE
Problem: Adult Inpatient Plan of Care  Goal: Patient-Specific Goal (Individualization)  Outcome: Ongoing (interventions implemented as appropriate)  Updated care plan w/ pt.  Address all issues throughout shift. Patient progressing towards goals as tolerated, no falls during shift. HAPI and Fall protocol maintained,   Pt continue on a heparin infusion at 9.1ml/hr.  Pt has been in the 50's-60's sr - on po amiodarone.  Awaiting interventional cardiology consult for TAVR evaluation- also pending blood cultures and PFT's  Spouse involved in care.

## 2019-07-08 NOTE — PROGRESS NOTES
[facility]  Cardiology  Progress Note    Patient Name: Wiley Alfred  MRN: [id number]  Admission Date: 7/5/2019  Hospital Length of Stay: 3 days  Code Status: Full Code   Attending Physician: Aaron Bacon MD   Primary Care Physician: Daniel Almeida MD  Expected Discharge Date:   Principal Problem:Acute on chronic heart failure    Subjective:   Chief Complaint: SOB    HPI:  Patient is 74 y.o. M w/ aortic stenosis, CAD, COPD on home O2, CVA, pAF (eliquis, amiodarone) presented to [facility] w/ acute onset of SOB. Patient was found to be hypertensive  and in hypoxia and hypercapnia respiratory failure requiring nitroglycerin gtt and BIPAP. Received lasix and nebs with solu-medrol with improvement. Patient received MANUEL which showed severe MR, moderate AS and TR with preserved LV function. He was evaluated by CTS, was deemed high risk as a result of the severe COPD; so was transferred to [facility] [place] for further evaluation of possible MVR/TVR/AVR vs TAVR/MV clip in a stable condition by [facility].     Prior to this admission patient was admitted on 6/23/19  Where he underwent LHC which revealed severe single-vessel CAD s/p PCI; patent stented RCA, moderate AS.     Hospital Course:   Transferred from [facility] for surgical/interventional cardiology evaluation for severe AS, mitral and tricuspid regurgitation, as well as ongoing management of ADHF and COPD exacerbation. IV furosemide 40mg QD given with adequate diuresis. Patient went into Afib w/ RVR on hospital day 2 after apneic episode overnight; received BIPAP QHS and was converted back to NSR. His amiodarone and metoprolol tartrate were continued from OSH.     CTS evaluated patient, not recommending surgical option due to high STS risk.     Interval History: No acute events overnight. No tachycarrhythmias overnight.     Review of Systems   Constitution: Negative for chills and fever.   HENT: Negative for congestion and sore  throat.    Eyes: Negative for photophobia and visual disturbance.   Cardiovascular: Positive for dyspnea on exertion. Negative for chest pain, irregular heartbeat, palpitations and syncope.   Respiratory: Positive for cough, shortness of breath and sputum production.    Skin: Negative for itching and rash.   Musculoskeletal: Negative for falls.   Gastrointestinal: Negative for constipation, diarrhea, nausea and vomiting.   Genitourinary: Negative for dysuria and flank pain.   Neurological: Negative for dizziness and headaches.   Psychiatric/Behavioral: Negative for altered mental status and memory loss.     Objective:     Vital Signs (Most Recent):  Temp: 97.9 °F (36.6 °C) (07/08/19 1132)  Pulse: (!) 56 (07/08/19 1132)  Resp: 16 (07/08/19 1132)  BP: (!) 105/55 (07/08/19 1132)  SpO2: 95 % (07/08/19 1132) Vital Signs (24h Range):  Temp:  [96.9 °F (36.1 °C)-98.5 °F (36.9 °C)] 97.9 °F (36.6 °C)  Pulse:  [] 56  Resp:  [16-22] 16  SpO2:  [93 %-100 %] 95 %  BP: (100-127)/(53-59) 105/55     Weight: 83.1 kg (183 lb 3.2 oz)  Body mass index is 28.69 kg/m².     SpO2: 95 %  O2 Device (Oxygen Therapy): nasal cannula      Intake/Output Summary (Last 24 hours) at 7/8/2019 1320  Last data filed at 7/8/2019 0531  Gross per 24 hour   Intake 349.2 ml   Output 650 ml   Net -300.8 ml       Lines/Drains/Airways     Peripheral Intravenous Line                 Peripheral IV - Single Lumen 07/05/19 2200 22 G Anterior;Left Upper Arm 2 days         Peripheral IV - Single Lumen 07/06/19 1800 22 G Posterior;Right Hand 1 day                Physical Exam   Constitutional: He is oriented to person, place, and time. No distress.   HENT:   Head: Normocephalic and atraumatic.   Mouth/Throat: Oropharynx is clear and moist.   Eyes: Right eye exhibits no discharge. Left eye exhibits no discharge. No scleral icterus.   Neck: JVD present.   Cardiovascular: Normal rate and regular rhythm.   Murmur heard.  Pulmonary/Chest: Effort normal. No stridor.  No respiratory distress. He has wheezes.   On NC   Abdominal: Soft. Bowel sounds are normal. He exhibits no distension.   Musculoskeletal: He exhibits edema (bilateral pitting edema to ankles). He exhibits no tenderness.   Neurological: He is alert and oriented to person, place, and time.   Skin: Skin is warm and dry. He is not diaphoretic.   Vitals reviewed.    Significant Labs:   CMP   Recent Labs   Lab 07/07/19 0412 07/08/19  0608    144   K 4.2 4.4    104   CO2 32* 31*   * 104   BUN 57* 52*   CREATININE 1.9* 1.8*   CALCIUM 8.9 9.0   PROT 5.8* 5.8*   ALBUMIN 3.1* 2.9*   BILITOT 0.8 0.7   ALKPHOS 58 55   AST 15 15   ALT 31 27   ANIONGAP 9 9   ESTGFRAFRICA 39.0* 41.6*   EGFRNONAA 33.7* 36.0*   , CBC   Recent Labs   Lab 07/07/19 0412 07/08/19  0608   WBC 10.86 12.36   HGB 8.0* 7.9*   HCT 28.1* 29.2*    156    and All pertinent lab results from the last 24 hours have been reviewed.    Significant Imaging:     CXR:  Impression       Bilateral pulmonary vascular prominence, areas of opacity likely relating to infiltrate and small pleural effusion at the left lung base, the overall pattern is that of progression, follow-up to document resolution as discussed above is recommended.      Electronically signed by: Noe Burgess  Date: 07/07/2019  Time: 08:05     Assessment and Plan:     * Acute on chronic heart failure  74 y.o. M w/ CAD, COPD on home O2, HTN, CVA, pAF (eliquis, amiodarone) presented w/ acute onset of SOB concerning for ADHF and COPD exacerbation. MANUEL found showed severe MR, moderate AS and TR with preserved LV function. Required BIPAP, nitroglycerin gtt and IV diuresis at OSH.     - Clinically stable  - F/u CXR, BNP, electrolytes  - IV furosemide 40mg QD  - continue home lopressor 25mg BID, atorvastatin 40mg  - BIPAP QHS and when napping  - strict I/O  - daily weights  - stepped down 7/6/19    Pleural effusion  Pleural effusion noted on CXR; repeat imaging noted infiltrate and  small pleural effusion at the left lung base, the overall pattern is that of progression. Suspect etiology related to volume status.     Plan:  - BCx, sputum gram stain + culture  - continue IV diuresis  - consider broad-spec abx if worsened respiratory status, fevers  - daily CXR    CAD (coronary artery disease)  Prior to this admission patient was admitted on 6/23/19  Where he underwent LHC which revealed severe single-vessel CAD s/p PCI; patent stented RCA, moderate AS.     Hypertension   at OSH requiring nitroglycerin gtt. Normotensive since transfer to [facility].     - holding home Norvasc for now    COPD exacerbation  50+ year smoking history; on home O2. Received nebs and solu-medrol at OSH.     Plan:  - duo-nebs prn  - oxygen support with NC, goal Sat 88%  - BIPAP QHS and prn  - steroid taper    Paroxysmal atrial fibrillation  CHADS-VASc 7 - 15.7% risk of stroke/TIA/systemic embolism  Continue amiodarone 400mg BID, metoprolol tartrate 25mg BID  Went into Afib on 7/6/19, HR <115. On heparin gtt.   Transition back to [facility] when appropriate    Severe aortic stenosis  TTE at OSH showed severe MR, moderate AS and TR with preserved LV function. Patient is transferred to [facility] for further evaluation of possible MVR/TVR/AVR vs TAVR/MV clip in a stable condition by [facility].     Plan:  - No surgical option as patient is high STS score  - Pending Interventional Cardiology evaluation      VTE Risk Mitigation (From admission, onward)        Ordered     heparin 25,000 units in dextrose 5% 250 mL (100 units/mL) infusion LOW INTENSITY nomogram - [facility]  Continuous      07/05/19 1152     heparin 25,000 units in dextrose 5% (100 units/ml) IV bolus from bag - ADDITIONAL PRN BOLUS - 30 units/kg  As needed (PRN)      07/05/19 1152     heparin 25,000 units in dextrose 5% (100 units/ml) IV bolus from bag - ADDITIONAL PRN BOLUS - 60 units/kg  As needed (PRN)      07/05/19 1152     IP VTE HIGH RISK PATIENT  Once      07/05/19 1148           Cierra Mullen MD  Cardiology  [facility]

## 2019-07-08 NOTE — HPI
Mr. Alfred is a 75 y/o M with severe aortic stenosis, CAD ([facility] which revealed severe single-vessel CAD s/p PCI; patent stented RCA, moderate AS.in 06/19), COPD on home O2, CVA, pAF (eliquis, amiodarone) who presented to [facility] [place] with dyspnea 2/2 an acute exacerbation of his COPD and heart failure secondary to severe AS, severe MR, mod-severe TR, with pulmonary hypertension (60mmHg). Was transferred to [facility] [place] for further evaluation of possible MVR/TVR/AVR vs TAVR/Mitral Clip. Interventional Cardiology and CTS were consulted for further management. Per CTS, STS for an isolated AVR + single-vessel CABG is 22% and therefore recommend against surgical interventions.     Per patient, feels less short of breath today than he did on admission. Denies chest pain, but does report some lower extremity edema. Denies palpitations.

## 2019-07-08 NOTE — NURSING
Resume care from previous nurse, pt voice no complaints, lying in bed w/ bed in lowest position and locked. Spouse is at bedside.. Heparin infusing at 12 units/hr 76kg- 9.1ml/hr by pump.    Call bell within reach, introduce myself to pt. Will continue to monitor pt status

## 2019-07-08 NOTE — CONSULTS
"INTERVENTIONAL CARDIOLOGY  VALVE CENTER      Reason for Consult: Severe Aortic Stenosis    HISTORY OF PRESENT ILLNESS:    Wiley Alfred is a 74 y.o. male referred by Dr Cervantes for evaluation of severe AS (NYHA Class IV sx).    he has undergone the following TAVR work-up:   ECHO (Date 07/05/19): TYLER= 0.99 cm2, MG= 44mmHg, Peak Nate= 4.14 m/s, EF= 68%.   LHC (Date 6/23/2019): severe single vessel CAD s/p PCI, patent RCA stent, moderate AS   STS: 22%   Frailty: pending/4   Iliacs are > on L and > on R - pending  LVOT area by CTA is cm2 and Avg Diameter is per Dr - pending  he is currently high risk for surgical valve per Dr Jurado due to pulmonary HTN, COPD, CKD stage III  PFTs: mild/moderate/severe FEV1 % predicted, FVC % predicted, MVV % predicted, DLCO % predicted - pending      He is a mm CoreValve/Chaim XT TAVR candidate via R or L TF/TA/TAx/Virginie access.    History reviewed. No pertinent past medical history.    History reviewed. No pertinent surgical history.    Review of Systems   Constitutional: Negative.    HENT: Negative.    Eyes: Negative.    Respiratory: Positive for cough and shortness of breath.    Cardiovascular: Positive for leg swelling. Negative for chest pain and orthopnea.   Gastrointestinal: Negative.    Genitourinary: Negative.    Musculoskeletal: Negative.    Skin: Negative.    Neurological: Negative.    Endo/Heme/Allergies: Negative.    Psychiatric/Behavioral: Negative.        BP (!) 156/70 (BP Location: Right arm, Patient Position: Sitting)   Pulse 61   Temp 97.9 °F (36.6 °C) (Oral)   Resp 20   Ht 5' 7" (1.702 m)   Wt 83.1 kg (183 lb 3.2 oz)   SpO2 (!) 90%   BMI 28.69 kg/m²     Physical Exam   Constitutional: He is oriented to person, place, and time and well-developed, well-nourished, and in no distress.   HENT:   Head: Normocephalic and atraumatic.   Eyes: Pupils are equal, round, and reactive to light. Conjunctivae are normal.   Neck: Normal range of motion. Neck supple. JVD " present.   Cardiovascular: Normal rate, regular rhythm and normal heart sounds.   Pulmonary/Chest: Effort normal. He has wheezes. He has rales.   Abdominal: Soft. Bowel sounds are normal.   Musculoskeletal: Normal range of motion. He exhibits edema.   Neurological: He is alert and oriented to person, place, and time.   Skin: Skin is warm and dry.   Psychiatric: Affect and judgment normal.       CMP  Sodium   Date Value Ref Range Status   07/08/2019 144 136 - 145 mmol/L Final     Potassium   Date Value Ref Range Status   07/08/2019 4.4 3.5 - 5.1 mmol/L Final     Chloride   Date Value Ref Range Status   07/08/2019 104 95 - 110 mmol/L Final     CO2   Date Value Ref Range Status   07/08/2019 31 (H) 23 - 29 mmol/L Final     Glucose   Date Value Ref Range Status   07/08/2019 104 70 - 110 mg/dL Final     BUN, Bld   Date Value Ref Range Status   07/08/2019 52 (H) 8 - 23 mg/dL Final     Creatinine   Date Value Ref Range Status   07/08/2019 1.8 (H) 0.5 - 1.4 mg/dL Final     Calcium   Date Value Ref Range Status   07/08/2019 9.0 8.7 - 10.5 mg/dL Final     Total Protein   Date Value Ref Range Status   07/08/2019 5.8 (L) 6.0 - 8.4 g/dL Final     Albumin   Date Value Ref Range Status   07/08/2019 2.9 (L) 3.5 - 5.2 g/dL Final     Total Bilirubin   Date Value Ref Range Status   07/08/2019 0.7 0.1 - 1.0 mg/dL Final     Comment:     For infants and newborns, interpretation of results should be based  on gestational age, weight and in agreement with clinical  observations.  Premature Infant recommended reference ranges:  Up to 24 hours.............<8.0 mg/dL  Up to 48 hours............<12.0 mg/dL  3-5 days..................<15.0 mg/dL  6-29 days.................<15.0 mg/dL       Alkaline Phosphatase   Date Value Ref Range Status   07/08/2019 55 55 - 135 U/L Final     AST   Date Value Ref Range Status   07/08/2019 15 10 - 40 U/L Final     ALT   Date Value Ref Range Status   07/08/2019 27 10 - 44 U/L Final     Anion Gap   Date Value Ref  Range Status   07/08/2019 9 8 - 16 mmol/L Final     eGFR if    Date Value Ref Range Status   07/08/2019 41.6 (A) >60 mL/min/1.73 m^2 Final     eGFR if non    Date Value Ref Range Status   07/08/2019 36.0 (A) >60 mL/min/1.73 m^2 Final     Comment:     Calculation used to obtain the estimated glomerular filtration  rate (eGFR) is the CKD-EPI equation.        Lab Results   Component Value Date    WBC 12.36 07/08/2019    HGB 7.9 (L) 07/08/2019    HCT 29.2 (L) 07/08/2019    MCV 72 (L) 07/08/2019     07/08/2019     Lab Results   Component Value Date    APTT 42.7 (H) 07/08/2019     Lab Results   Component Value Date    INR 1.1 07/08/2019    INR 1.1 07/05/2019       ASSESSMENT/PLAN:     Patient Active Problem List   Diagnosis    Acute on chronic heart failure    Severe aortic stenosis    Paroxysmal atrial fibrillation    COPD exacerbation    Hypertension    CAD (coronary artery disease)    Non-rheumatic mitral regurgitation    Pleural effusion     Severe AS: Referred by Dr. Cervantes  ECHO (Date 07/05/19): TYLER= 0.99 cm2, MG= 44mmHg, Peak Nate= 4.14 m/s, EF= 68%.   [facility] (Date 6/23/2019): severe single vessel CAD s/p PCI, patent RCA stent, moderate AS   STS: 22%   Frailty: pending/4   Iliacs are > on L and > on R - pending  LVOT area by CTA is cm2 and Avg Diameter is per Dr - pending  he is currently high risk for surgical valve per Dr Jurado due to pulmonary HTN, COPD, CKD stage III  PFTs: mild/moderate/severe FEV1 % predicted, FVC % predicted, MVV % predicted, DLCO % predicted - pending  MANUEL, PFTs ordered and pending  Patient will need CTA, but given elevated Cr will hold off for now  Awaiting [facility] films from [place], [place]  Continue medical optimization  Will continue evaluation for TAVR prior to discharge (f/u will be in TAVR clinic)    Suma Murillo MD  Cardiology - PGY4

## 2019-07-08 NOTE — PLAN OF CARE
Problem: Physical Therapy Goal  Goal: Physical Therapy Goal  Goals to be met by: 19     Patient will increase functional independence with mobility by performin. Supine to sit with Supervision  2. Sit to stand transfer with Supervision  3. Gait  x 150 feet with Supervision using AD as needed.  4. Lower extremity exercise program x15 reps, with supervision, in order to increase LE strength and (I) with functional mobility.       Outcome: Ongoing (interventions implemented as appropriate)  Pt's goals remain appropriate and pt will continue to benefit from skilled PT services to work towards improved functional mobility including: bed mobility, transfers, and gait.   Savanna Alvarez, PT  2019

## 2019-07-08 NOTE — PLAN OF CARE
Problem: Adult Inpatient Plan of Care  Goal: Plan of Care Review  Plan of care discussed with patient and wife; verbalized understanding using teach-back method. No acute events overnight. Remained free of falls and injury. Continuing heparin drip with PTTs daily. Remained NSR on monitor overnight. On 4L nasal cannula; refused BIPAP early this morning. BP stable overnight. 40mg Lasix daily; diuresing well. No complaints of SOB or CP overnight. VSS. Will continue to monitor.

## 2019-07-08 NOTE — ASSESSMENT & PLAN NOTE
at OSH requiring nitroglycerin gtt. Normotensive since transfer to [facility].     - holding home Norvasc for now

## 2019-07-08 NOTE — ASSESSMENT & PLAN NOTE
CHADS-VASc 7 - 15.7% risk of stroke/TIA/systemic embolism  Continue amiodarone 400mg BID, metoprolol tartrate 25mg BID  Went into Afib on 7/6/19, HR <115. On heparin gtt.   Transition back to Eliquis when appropriate

## 2019-07-08 NOTE — ASSESSMENT & PLAN NOTE
Pleural effusion noted on CXR; repeat imaging noted infiltrate and small pleural effusion at the left lung base, the overall pattern is that of progression. Suspect etiology related to volume status.     Plan:  - BCx, sputum gram stain + culture  - continue IV diuresis  - consider broad-spec abx if worsened respiratory status, fevers  - daily CXR

## 2019-07-08 NOTE — SUBJECTIVE & OBJECTIVE
History reviewed. No pertinent past medical history.    History reviewed. No pertinent surgical history.    Review of patient's allergies indicates:   Allergen Reactions    Bacitracin/polymyxin      inflammation    Bactrim [sulfamethoxazole-trimethoprim] Blisters       PTA Medications   Medication Sig    albuterol (ACCUNEB) 1.25 mg/3 mL Nebu Take 2.5 mg by nebulization every 6 (six) hours as needed. Rescue    albuterol (PROAIR HFA) 90 mcg/actuation inhaler Inhale 2 puffs into the lungs every 6 (six) hours as needed for Wheezing. Rescue    amiodarone (PACERONE) 200 MG Tab Take 400 mg by mouth 2 (two) times daily.    amLODIPine (NORVASC) 10 MG tablet Take 10 mg by mouth once daily.    apixaban (ELIQUIS) 5 mg Tab Take 5 mg by mouth 2 (two) times daily.    fluticasone-umeclidin-vilanter (TRELEGY ELLIPTA) 100-62.5-25 mcg DsDv Inhale 1 puff into the lungs once daily.    furosemide (LASIX) 40 MG tablet Take 40 mg by mouth once daily.    guaiFENesin (MUCINEX) 600 mg 12 hr tablet Take 600 mg by mouth once daily.    ipratropium (ATROVENT) 0.02 % nebulizer solution Take 500 mcg by nebulization 4 (four) times daily. Rescue    lansoprazole (PREVACID) 30 MG capsule Take 30 mg by mouth once daily.    methylPREDNISolone (MEDROL DOSEPACK) 4 mg tablet Take 4 mg by mouth. use as directed    metoprolol tartrate (LOPRESSOR) 25 MG tablet Take 25 mg by mouth 2 (two) times daily.    nitroGLYCERIN (NITROSTAT) 0.4 MG SL tablet Place 0.4 mg under the tongue every 5 (five) minutes as needed for Chest pain.    potassium chloride (KLOR-CON) 10 MEQ TbSR Take 20 mEq by mouth once.    rosuvastatin (CRESTOR) 20 MG tablet Take 20 mg by mouth once daily.     Family History     None        Tobacco Use    Smoking status: Former Smoker    Smokeless tobacco: Former User   Substance and Sexual Activity    Alcohol use: Not on file    Drug use: Not on file    Sexual activity: Not on file     Review of Systems   Constitution: Negative.    HENT: Negative.    Eyes: Negative.    Cardiovascular: Positive for leg swelling.   Respiratory: Positive for cough and shortness of breath.    Endocrine: Negative.    Hematologic/Lymphatic: Negative.    Skin: Negative.    Musculoskeletal: Negative.    Gastrointestinal: Negative.    Genitourinary: Negative.    Neurological: Negative.    Psychiatric/Behavioral: Negative.    Allergic/Immunologic: Negative.      Objective:     Vital Signs (Most Recent):  Temp: 97.6 °F (36.4 °C) (07/08/19 0410)  Pulse: (!) 57 (07/08/19 0410)  Resp: (!) 22 (07/08/19 0410)  BP: (!) 105/55 (07/08/19 0410)  SpO2: 95 % (07/08/19 0410) Vital Signs (24h Range):  Temp:  [96.9 °F (36.1 °C)-98.5 °F (36.9 °C)] 97.6 °F (36.4 °C)  Pulse:  [] 57  Resp:  [16-22] 22  SpO2:  [92 %-98 %] 95 %  BP: ()/(53-71) 105/55     Weight: 83.1 kg (183 lb 3.2 oz)  Body mass index is 28.69 kg/m².    SpO2: 95 %  O2 Device (Oxygen Therapy): nasal cannula w/ humidification      Intake/Output Summary (Last 24 hours) at 7/8/2019 0705  Last data filed at 7/8/2019 0531  Gross per 24 hour   Intake 829.2 ml   Output 1150 ml   Net -320.8 ml       Lines/Drains/Airways     Peripheral Intravenous Line                 Peripheral IV - Single Lumen 07/05/19 2200 22 G Anterior;Left Upper Arm 2 days         Peripheral IV - Single Lumen 07/06/19 1800 22 G Posterior;Right Hand 1 day                Physical Exam   Constitutional: He is oriented to person, place, and time. He appears well-developed and well-nourished.   HENT:   Head: Normocephalic and atraumatic.   Mouth/Throat: Oropharynx is clear and moist.   Eyes: Pupils are equal, round, and reactive to light. EOM are normal.   Neck: Normal range of motion. Neck supple. JVD present.   Cardiovascular: Normal rate and regular rhythm.   Murmur heard.  Pulmonary/Chest: Effort normal. He has wheezes. He has rales.   Abdominal: Soft. Bowel sounds are normal.   Musculoskeletal: Normal range of motion. He exhibits edema.    Neurological: He is alert and oriented to person, place, and time.   Skin: Skin is warm and dry. No erythema.   Psychiatric: He has a normal mood and affect.       Significant Labs:   CMP   Recent Labs   Lab 07/07/19 0412      K 4.2      CO2 32*   *   BUN 57*   CREATININE 1.9*   CALCIUM 8.9   PROT 5.8*   ALBUMIN 3.1*   BILITOT 0.8   ALKPHOS 58   AST 15   ALT 31   ANIONGAP 9   ESTGFRAFRICA 39.0*   EGFRNONAA 33.7*   , CBC   Recent Labs   Lab 07/07/19 0412   WBC 10.86   HGB 8.0*   HCT 28.1*      , INR No results for input(s): INR, PROTIME in the last 48 hours. and Troponin No results for input(s): TROPONINI in the last 48 hours.    Significant Imaging: Echocardiogram:   Transthoracic echo (TTE) complete (Cupid Only):   Results for orders placed or performed during the hospital encounter of 07/05/19   Transthoracic echo (TTE) 2D with Color Flow   Result Value Ref Range    Ascending aorta 3.20 cm    STJ 2.54 cm    AV mean gradient 44 mmHg    Ao peak nate 4.14 m/s    Ao VTI 92.22 cm    IVRT 0.05 msec    IVS 0.99 0.6 - 1.1 cm    LA size 5.97 cm    Left Atrium Major Axis 7.09 cm    Left Atrium Minor Axis 7.12 cm    LVIDD 6.05 (A) 3.5 - 6.0 cm    LVIDS 3.79 2.1 - 4.0 cm    LVOT diameter 2.24 cm    LVOT peak VTI 23.10 cm    PW 0.97 0.6 - 1.1 cm    MV Peak A Nate 0.41 m/s    E wave decelartion time 188.46 msec    MV Peak E Nate 1.65 m/s    RA Major Axis 5.84 cm    RA Width 4.98 cm    RVDD 3.82 cm    Sinus 3.14 cm    TAPSE 3.14 cm    TR Max Nate 3.80 m/s    TDI LATERAL 0.10 m/s    TDI SEPTAL 0.11 m/s    LA WIDTH 5.69 cm    LV Diastolic Volume 183.31 mL    LV Systolic Volume 61.52 mL    RV S' 13.85 cm/s    LVOT peak nate 1.11 m/s    LV LATERAL E/E' RATIO 16.50 m/s    LV SEPTAL E/E' RATIO 15.00 m/s    FS 37 %    LA volume 205.15 cm3    LV mass 243.94 g    Left Ventricle Relative Wall Thickness 0.32 cm    AV valve area 0.99 cm2    AV Velocity Ratio 0.27     AV index (prosthetic) 0.25     E/A ratio 4.02      Mean e' 0.11 m/s    LVOT area 3.9 cm2    LVOT stroke volume 90.99 cm3    AV peak gradient 69 mmHg    E/E' ratio 15.71 m/s    Triscuspid Valve Regurgitation Peak Gradient 58 mmHg    Right Atrial Pressure (from IVC) 3 mmHg    TV rest pulmonary artery pressure 61 mmHg    Narrative    · Mild left ventricular enlargement.  · Normal left ventricular systolic function. The estimated ejection   fraction is 68%  · Mild eccentric left ventricular hypertrophy.  · Indeterminate left ventricular diastolic function.  · Normal right ventricular systolic function.  · Severe left atrial enlargement.  · Moderate right atrial enlargement.  · Moderate-to-severe aortic valve stenosis.  · Aortic valve area is 0.99 cm2; peak velocity is 4.14 m/s; mean gradient   is 44 mmHg.  · Mild mitral sclerosis.  · Moderate-to-severe mitral regurgitation.  · Moderate to severe tricuspid regurgitation.  · Mild aortic regurgitation.  · Normal central venous pressure (3 mm Hg).  · The estimated PA systolic pressure is 61 mm Hg  · Pulmonary hypertension present.       and EKG: atrial fibrillation with RVR

## 2019-07-08 NOTE — ASSESSMENT & PLAN NOTE
TTE at OSH showed severe MR, moderate AS and TR with preserved LV function. [place] [facility] [place] possible MVR/TVR/AVR vs TAVR/MV clip in a stable condition by [facility].     Plan:  - No surgical option as patient is high STS score  - Pending Interventional Cardiology evaluation

## 2019-07-08 NOTE — NURSING
Walked into room to respond to patient call light blinking. Patient noted to have CPAP mask sitting on top of head and requesting to remove CPAP mask. CPAP mask removed and replaced with nasal cannula at 4L. O2 sat on nasal cannula 90%. Patient denies any shortness of breath. Patient left lying in bed with call light in reach. Will continue to monitor.

## 2019-07-08 NOTE — SUBJECTIVE & OBJECTIVE
Interval History: No acute events overnight. No tachycarrhythmias overnight.     Review of Systems   Constitution: Negative for chills and fever.   HENT: Negative for congestion and sore throat.    Eyes: Negative for photophobia and visual disturbance.   Cardiovascular: Positive for dyspnea on exertion. Negative for chest pain, irregular heartbeat, palpitations and syncope.   Respiratory: Positive for cough, shortness of breath and sputum production.    Skin: Negative for itching and rash.   Musculoskeletal: Negative for falls.   Gastrointestinal: Negative for constipation, diarrhea, nausea and vomiting.   Genitourinary: Negative for dysuria and flank pain.   Neurological: Negative for dizziness and headaches.   Psychiatric/Behavioral: Negative for altered mental status and memory loss.     Objective:     Vital Signs (Most Recent):  Temp: 97.9 °F (36.6 °C) (07/08/19 1132)  Pulse: (!) 56 (07/08/19 1132)  Resp: 16 (07/08/19 1132)  BP: (!) 105/55 (07/08/19 1132)  SpO2: 95 % (07/08/19 1132) Vital Signs (24h Range):  Temp:  [96.9 °F (36.1 °C)-98.5 °F (36.9 °C)] 97.9 °F (36.6 °C)  Pulse:  [] 56  Resp:  [16-22] 16  SpO2:  [93 %-100 %] 95 %  BP: (100-127)/(53-59) 105/55     Weight: 83.1 kg (183 lb 3.2 oz)  Body mass index is 28.69 kg/m².     SpO2: 95 %  O2 Device (Oxygen Therapy): nasal cannula      Intake/Output Summary (Last 24 hours) at 7/8/2019 1320  Last data filed at 7/8/2019 0531  Gross per 24 hour   Intake 349.2 ml   Output 650 ml   Net -300.8 ml       Lines/Drains/Airways     Peripheral Intravenous Line                 Peripheral IV - Single Lumen 07/05/19 2200 22 G Anterior;Left Upper Arm 2 days         Peripheral IV - Single Lumen 07/06/19 1800 22 G Posterior;Right Hand 1 day                Physical Exam   Constitutional: He is oriented to person, place, and time. No distress.   HENT:   Head: Normocephalic and atraumatic.   Mouth/Throat: Oropharynx is clear and moist.   Eyes: Right eye exhibits no  discharge. Left eye exhibits no discharge. No scleral icterus.   Neck: JVD present.   Cardiovascular: Normal rate and regular rhythm.   Murmur heard.  Pulmonary/Chest: Effort normal. No stridor. No respiratory distress. He has wheezes.   On NC   Abdominal: Soft. Bowel sounds are normal. He exhibits no distension.   Musculoskeletal: He exhibits edema (bilateral pitting edema to ankles). He exhibits no tenderness.   Neurological: He is alert and oriented to person, place, and time.   Skin: Skin is warm and dry. He is not diaphoretic.   Vitals reviewed.    Significant Labs:   CMP   Recent Labs   Lab 07/07/19 0412 07/08/19  0608    144   K 4.2 4.4    104   CO2 32* 31*   * 104   BUN 57* 52*   CREATININE 1.9* 1.8*   CALCIUM 8.9 9.0   PROT 5.8* 5.8*   ALBUMIN 3.1* 2.9*   BILITOT 0.8 0.7   ALKPHOS 58 55   AST 15 15   ALT 31 27   ANIONGAP 9 9   ESTGFRAFRICA 39.0* 41.6*   EGFRNONAA 33.7* 36.0*   , CBC   Recent Labs   Lab 07/07/19 0412 07/08/19  0608   WBC 10.86 12.36   HGB 8.0* 7.9*   HCT 28.1* 29.2*    156    and All pertinent lab results from the last 24 hours have been reviewed.    Significant Imaging:     CXR:  Impression       Bilateral pulmonary vascular prominence, areas of opacity likely relating to infiltrate and small pleural effusion at the left lung base, the overall pattern is that of progression, follow-up to document resolution as discussed above is recommended.      Electronically signed by: Noe Burgess  Date: 07/07/2019  Time: 08:05

## 2019-07-08 NOTE — ASSESSMENT & PLAN NOTE
74 y.o. M w/ CAD, COPD on home O2, HTN, CVA, pAF (eliquis, amiodarone) presented w/ acute onset of SOB concerning for ADHF and COPD exacerbation. MANUEL found showed severe MR, moderate AS and TR with preserved LV function. Required BIPAP, nitroglycerin gtt and IV diuresis at OSH.     - Clinically stable  - F/u CXR, BNP, electrolytes  - IV furosemide 40mg QD  - continue home lopressor 25mg BID, atorvastatin 40mg  - BIPAP QHS and when napping  - strict I/O  - daily weights  - stepped down 7/6/19

## 2019-07-09 ENCOUNTER — ANESTHESIA (OUTPATIENT)
Dept: MEDSURG UNIT | Facility: HOSPITAL | Age: 74
DRG: 291 | End: 2019-07-09
Payer: MEDICARE

## 2019-07-09 ENCOUNTER — ANESTHESIA EVENT (OUTPATIENT)
Dept: MEDSURG UNIT | Facility: HOSPITAL | Age: 74
DRG: 291 | End: 2019-07-09
Payer: MEDICARE

## 2019-07-09 LAB
ALBUMIN SERPL BCP-MCNC: 3 G/DL (ref 3.5–5.2)
ALP SERPL-CCNC: 53 U/L (ref 55–135)
ALT SERPL W/O P-5'-P-CCNC: 25 U/L (ref 10–44)
ANION GAP SERPL CALC-SCNC: 9 MMOL/L (ref 8–16)
APTT BLDCRRT: 37.7 SEC (ref 21–32)
APTT BLDCRRT: 55.1 SEC (ref 21–32)
APTT BLDCRRT: 58 SEC (ref 21–32)
AST SERPL-CCNC: 15 U/L (ref 10–40)
BASOPHILS # BLD AUTO: 0.01 K/UL (ref 0–0.2)
BASOPHILS NFR BLD: 0.1 % (ref 0–1.9)
BILIRUB SERPL-MCNC: 0.8 MG/DL (ref 0.1–1)
BILIRUB UR QL STRIP: NEGATIVE
BUN SERPL-MCNC: 58 MG/DL (ref 8–23)
CALCIUM SERPL-MCNC: 8.7 MG/DL (ref 8.7–10.5)
CHLORIDE SERPL-SCNC: 103 MMOL/L (ref 95–110)
CLARITY UR REFRACT.AUTO: ABNORMAL
CO2 SERPL-SCNC: 28 MMOL/L (ref 23–29)
COLOR UR AUTO: YELLOW
CREAT SERPL-MCNC: 2 MG/DL (ref 0.5–1.4)
DIFFERENTIAL METHOD: ABNORMAL
EOSINOPHIL # BLD AUTO: 0.4 K/UL (ref 0–0.5)
EOSINOPHIL NFR BLD: 2.5 % (ref 0–8)
ERYTHROCYTE [DISTWIDTH] IN BLOOD BY AUTOMATED COUNT: 20.7 % (ref 11.5–14.5)
EST. GFR  (AFRICAN AMERICAN): 36.7 ML/MIN/1.73 M^2
EST. GFR  (NON AFRICAN AMERICAN): 31.7 ML/MIN/1.73 M^2
GLUCOSE SERPL-MCNC: 89 MG/DL (ref 70–110)
GLUCOSE UR QL STRIP: NEGATIVE
HCT VFR BLD AUTO: 29.5 % (ref 40–54)
HGB BLD-MCNC: 7.9 G/DL (ref 14–18)
HGB UR QL STRIP: NEGATIVE
IMM GRANULOCYTES # BLD AUTO: 0.16 K/UL (ref 0–0.04)
IMM GRANULOCYTES NFR BLD AUTO: 1.1 % (ref 0–0.5)
KETONES UR QL STRIP: NEGATIVE
LEUKOCYTE ESTERASE UR QL STRIP: NEGATIVE
LYMPHOCYTES # BLD AUTO: 2.1 K/UL (ref 1–4.8)
LYMPHOCYTES NFR BLD: 14.3 % (ref 18–48)
MAGNESIUM SERPL-MCNC: 2.4 MG/DL (ref 1.6–2.6)
MCH RBC QN AUTO: 19.3 PG (ref 27–31)
MCHC RBC AUTO-ENTMCNC: 26.8 G/DL (ref 32–36)
MCV RBC AUTO: 72 FL (ref 82–98)
MONOCYTES # BLD AUTO: 1 K/UL (ref 0.3–1)
MONOCYTES NFR BLD: 7 % (ref 4–15)
NEUTROPHILS # BLD AUTO: 10.9 K/UL (ref 1.8–7.7)
NEUTROPHILS NFR BLD: 75 % (ref 38–73)
NITRITE UR QL STRIP: NEGATIVE
NRBC BLD-RTO: 0 /100 WBC
PH UR STRIP: 5 [PH] (ref 5–8)
PHOSPHATE SERPL-MCNC: 3.5 MG/DL (ref 2.7–4.5)
PLATELET # BLD AUTO: 135 K/UL (ref 150–350)
PMV BLD AUTO: ABNORMAL FL (ref 9.2–12.9)
POTASSIUM SERPL-SCNC: 3.9 MMOL/L (ref 3.5–5.1)
PROT SERPL-MCNC: 5.8 G/DL (ref 6–8.4)
PROT UR QL STRIP: NEGATIVE
RBC # BLD AUTO: 4.1 M/UL (ref 4.6–6.2)
SODIUM SERPL-SCNC: 140 MMOL/L (ref 136–145)
SP GR UR STRIP: 1.02 (ref 1–1.03)
URN SPEC COLLECT METH UR: ABNORMAL
WBC # BLD AUTO: 14.5 K/UL (ref 3.9–12.7)

## 2019-07-09 PROCEDURE — 81003 URINALYSIS AUTO W/O SCOPE: CPT

## 2019-07-09 PROCEDURE — 99233 SBSQ HOSP IP/OBS HIGH 50: CPT | Mod: ,,, | Performed by: INTERNAL MEDICINE

## 2019-07-09 PROCEDURE — 27000221 HC OXYGEN, UP TO 24 HOURS

## 2019-07-09 PROCEDURE — 85730 THROMBOPLASTIN TIME PARTIAL: CPT | Mod: 91

## 2019-07-09 PROCEDURE — 20600001 HC STEP DOWN PRIVATE ROOM

## 2019-07-09 PROCEDURE — 94660 CPAP INITIATION&MGMT: CPT

## 2019-07-09 PROCEDURE — 80053 COMPREHEN METABOLIC PANEL: CPT

## 2019-07-09 PROCEDURE — 99233 PR SUBSEQUENT HOSPITAL CARE,LEVL III: ICD-10-PCS | Mod: ,,, | Performed by: INTERNAL MEDICINE

## 2019-07-09 PROCEDURE — 25000003 PHARM REV CODE 250: Performed by: STUDENT IN AN ORGANIZED HEALTH CARE EDUCATION/TRAINING PROGRAM

## 2019-07-09 PROCEDURE — 85025 COMPLETE CBC W/AUTO DIFF WBC: CPT

## 2019-07-09 PROCEDURE — 36415 COLL VENOUS BLD VENIPUNCTURE: CPT

## 2019-07-09 PROCEDURE — 25000242 PHARM REV CODE 250 ALT 637 W/ HCPCS

## 2019-07-09 PROCEDURE — 63600175 PHARM REV CODE 636 W HCPCS

## 2019-07-09 PROCEDURE — 99900035 HC TECH TIME PER 15 MIN (STAT)

## 2019-07-09 PROCEDURE — 25000003 PHARM REV CODE 250: Performed by: INTERNAL MEDICINE

## 2019-07-09 PROCEDURE — 94761 N-INVAS EAR/PLS OXIMETRY MLT: CPT

## 2019-07-09 PROCEDURE — 85730 THROMBOPLASTIN TIME PARTIAL: CPT

## 2019-07-09 PROCEDURE — 83735 ASSAY OF MAGNESIUM: CPT

## 2019-07-09 PROCEDURE — 94640 AIRWAY INHALATION TREATMENT: CPT

## 2019-07-09 PROCEDURE — 25000003 PHARM REV CODE 250

## 2019-07-09 PROCEDURE — 63600175 PHARM REV CODE 636 W HCPCS: Performed by: STUDENT IN AN ORGANIZED HEALTH CARE EDUCATION/TRAINING PROGRAM

## 2019-07-09 PROCEDURE — 84100 ASSAY OF PHOSPHORUS: CPT

## 2019-07-09 RX ORDER — ASPIRIN 81 MG/1
81 TABLET ORAL DAILY
Status: DISCONTINUED | OUTPATIENT
Start: 2019-07-09 | End: 2019-07-12 | Stop reason: HOSPADM

## 2019-07-09 RX ORDER — CLOPIDOGREL BISULFATE 75 MG/1
75 TABLET ORAL DAILY
Status: DISCONTINUED | OUTPATIENT
Start: 2019-07-09 | End: 2019-07-12 | Stop reason: HOSPADM

## 2019-07-09 RX ADMIN — IPRATROPIUM BROMIDE AND ALBUTEROL SULFATE 3 ML: .5; 3 SOLUTION RESPIRATORY (INHALATION) at 10:07

## 2019-07-09 RX ADMIN — CLOPIDOGREL 75 MG: 75 TABLET, FILM COATED ORAL at 09:07

## 2019-07-09 RX ADMIN — PANTOPRAZOLE SODIUM 40 MG: 40 TABLET, DELAYED RELEASE ORAL at 08:07

## 2019-07-09 RX ADMIN — ROSUVASTATIN CALCIUM 20 MG: 20 TABLET, FILM COATED ORAL at 10:07

## 2019-07-09 RX ADMIN — GUAIFENESIN 600 MG: 600 TABLET, EXTENDED RELEASE ORAL at 08:07

## 2019-07-09 RX ADMIN — IPRATROPIUM BROMIDE AND ALBUTEROL SULFATE 3 ML: .5; 3 SOLUTION RESPIRATORY (INHALATION) at 05:07

## 2019-07-09 RX ADMIN — AMIODARONE HYDROCHLORIDE 400 MG: 200 TABLET ORAL at 10:07

## 2019-07-09 RX ADMIN — METOPROLOL TARTRATE 25 MG: 25 TABLET, FILM COATED ORAL at 10:07

## 2019-07-09 RX ADMIN — HEPARIN SODIUM AND DEXTROSE 14 UNITS/KG/HR: 10000; 5 INJECTION INTRAVENOUS at 06:07

## 2019-07-09 RX ADMIN — PREDNISONE 5 MG: 5 TABLET ORAL at 08:07

## 2019-07-09 RX ADMIN — AMIODARONE HYDROCHLORIDE 400 MG: 200 TABLET ORAL at 08:07

## 2019-07-09 RX ADMIN — ASPIRIN 81 MG: 81 TABLET, COATED ORAL at 09:07

## 2019-07-09 RX ADMIN — METOPROLOL TARTRATE 25 MG: 25 TABLET, FILM COATED ORAL at 08:07

## 2019-07-09 NOTE — SUBJECTIVE & OBJECTIVE
Interval History: No acute events overnight. No complaints this morning. Continues to have chronic cough. PFT and MANUEL today.     Review of Systems   Constitution: Negative for chills and fever.   HENT: Negative for congestion and sore throat.    Eyes: Negative for photophobia and visual disturbance.   Cardiovascular: Positive for dyspnea on exertion. Negative for chest pain, irregular heartbeat, palpitations and syncope.   Respiratory: Positive for cough, shortness of breath and sputum production.    Skin: Negative for itching and rash.   Musculoskeletal: Negative for falls.   Gastrointestinal: Negative for constipation, diarrhea, nausea and vomiting.   Genitourinary: Negative for dysuria and flank pain.   Neurological: Negative for dizziness and headaches.   Psychiatric/Behavioral: Negative for altered mental status and memory loss.     Objective:     Vital Signs (Most Recent):  Temp: 98 °F (36.7 °C) (07/09/19 0834)  Pulse: (!) 52 (07/09/19 1119)  Resp: 17 (07/09/19 0834)  BP: 133/60 (07/09/19 0834)  SpO2: 95 % (07/09/19 0834) Vital Signs (24h Range):  Temp:  [97.5 °F (36.4 °C)-98.9 °F (37.2 °C)] 98 °F (36.7 °C)  Pulse:  [50-61] 52  Resp:  [12-22] 17  SpO2:  [90 %-98 %] 95 %  BP: (105-156)/(55-70) 133/60     Weight: 82.3 kg (181 lb 7 oz)  Body mass index is 28.42 kg/m².     SpO2: 95 %  O2 Device (Oxygen Therapy): nasal cannula w/ humidification      Intake/Output Summary (Last 24 hours) at 7/9/2019 1128  Last data filed at 7/8/2019 2155  Gross per 24 hour   Intake 538.3 ml   Output 1100 ml   Net -561.7 ml       Lines/Drains/Airways     Peripheral Intravenous Line                 Peripheral IV - Single Lumen 07/05/19 2200 22 G Anterior;Left Upper Arm 3 days         Peripheral IV - Single Lumen 07/06/19 1800 22 G Posterior;Right Hand 2 days              Physical Exam   Constitutional: He is oriented to person, place, and time. No distress.   HENT:   Head: Normocephalic and atraumatic.   Mouth/Throat: Oropharynx is  clear and moist.   Eyes: Right eye exhibits no discharge. Left eye exhibits no discharge. No scleral icterus.   Neck: JVD present.   Cardiovascular: Normal rate and regular rhythm.   Murmur heard.  Pulmonary/Chest: Effort normal. No stridor. No respiratory distress. He has wheezes.   On NC   Abdominal: Soft. Bowel sounds are normal. He exhibits no distension.   Musculoskeletal: He exhibits edema (bilateral pitting edema to ankles). He exhibits no tenderness.   Neurological: He is alert and oriented to person, place, and time.   Skin: Skin is warm and dry. He is not diaphoretic.   Vitals reviewed.    Significant Labs:   Blood Culture:   Recent Labs   Lab 07/08/19  1412   LABBLOO No Growth to date  No Growth to date   , CMP   Recent Labs   Lab 07/08/19  0608 07/09/19  0457    140   K 4.4 3.9    103   CO2 31* 28    89   BUN 52* 58*   CREATININE 1.8* 2.0*   CALCIUM 9.0 8.7   PROT 5.8* 5.8*   ALBUMIN 2.9* 3.0*   BILITOT 0.7 0.8   ALKPHOS 55 53*   AST 15 15   ALT 27 25   ANIONGAP 9 9   ESTGFRAFRICA 41.6* 36.7*   EGFRNONAA 36.0* 31.7*   , CBC   Recent Labs   Lab 07/08/19  0608 07/09/19  0457   WBC 12.36 14.50*   HGB 7.9* 7.9*   HCT 29.2* 29.5*    135*    and All pertinent lab results from the last 24 hours have been reviewed.    Significant Imaging:   CXR 7/8/19:  FINDINGS:  Single chest view is submitted.  The cardiomediastinal silhouette appears stable.  There is improved aeration at the left lung base, and improved appearance of the right lung base with the previously identified areas of nodular opacity appearing improved.  There is no evidence for pneumothorax.  The osseous structures demonstrate chronic change.      Impression       Interval improvement.      Electronically signed by: Noe Burgess  Date: 07/09/2019  Time: 01:30

## 2019-07-09 NOTE — ASSESSMENT & PLAN NOTE
Prior to this admission patient was admitted on 6/23/19  Where he underwent LHC which revealed severe single-vessel CAD s/p PCI; patent stented RCA, moderate AS.     - continue DAPT

## 2019-07-09 NOTE — ASSESSMENT & PLAN NOTE
at OSH requiring nitroglycerin gtt. Normotensive since transfer to [facility] [place].     - holding home Norvasc for now

## 2019-07-09 NOTE — PLAN OF CARE
Extended Emergency Contact Information  Primary Emergency Contact: Dipti Alfred  Address: [address] [place] [address] [place]  Home Phone: [phone]  Relation: Spouse  Preferred language: English   needed? No    Daniel Almeida MD  [address] [place] [facility] /*    No future appointments.    Payor: MEDICARE / Plan: MEDICARE PART A & B / Product Type: Government /     No Pharmacies Listed       07/08/19 2018   Discharge Assessment   Assessment Type Discharge Planning Assessment   Confirmed/corrected address and phone number on facesheet? Yes   Assessment information obtained from? Patient;Medical Record   Expected Length of Stay (days) 5   Communicated expected length of stay with patient/caregiver yes   Prior to hospitilization cognitive status: Alert/Oriented   Prior to hospitalization functional status: Independent   Current cognitive status: Alert/Oriented   Current Functional Status: Independent   Lives With spouse   Able to Return to Prior Arrangements yes   Is patient able to care for self after discharge? Yes   Patient's perception of discharge disposition home or selfcare   Readmission Within the Last 30 Days no previous admission in last 30 days   Patient currently being followed by outpatient case management? No   Patient currently receives any other outside agency services? No   Equipment Currently Used at Home cane, straight;shower chair;oxygen   Do you have any problems affording any of your prescribed medications? No   Is the patient taking medications as prescribed? yes   Does the patient have transportation home? Yes   Transportation Anticipated family or friend will provide   Does the patient receive services at the [facility]? No   Discharge Plan A Home   Discharge Plan B Home   DME Needed Upon Discharge  CPAP   Patient/Family in Agreement with Plan yes

## 2019-07-09 NOTE — PROGRESS NOTES
[facility]  Cardiology  Progress Note    Patient Name: Wiley Alfred  MRN: [id number]  Admission Date: 7/5/2019  Hospital Length of Stay: 4 days  Code Status: Full Code   Attending Physician: Aaron Bacon MD   Primary Care Physician: Daniel Almeida MD  Expected Discharge Date: 7/11/2019  Principal Problem:Acute on chronic heart failure    Subjective:   Chief Complaint: SOB    HPI:  Patient is 74 y.o. M w/ aortic stenosis, CAD, COPD on home O2, CVA, pAF (eliquis, amiodarone) presented to [facility] [place] 2[place] w/ acute onset of SOB. Patient was found to be hypertensive  and in hypoxia and hypercapnia respiratory failure requiring nitroglycerin gtt and BIPAP. Received lasix and nebs with solu-medrol with improvement. Patient received MANUEL which showed severe MR, moderate AS and TR with preserved LV function. He was evaluated by CTS, was deemed high risk as a result of the severe COPD; so [place] [facility] [place] of possible MVR/TVR/AVR vs TAVR/MV clip in a stable condition by [facility].     Prior to this admission patient was admitted on 6/23/19  Where he underwent LHC which revealed severe single-vessel CAD s/p PCI; patent stented RCA, moderate AS.     Hospital Course:   Transferred from [facility] [place]note 2[place] for surgical/interventional cardiology evaluation for severe AS, mitral and tricuspid regurgitation, as well as ongoing management of ADHF and COPD exacerbation. IV furosemide 40mg QD given with adequate diuresis. Patient went into Afib w/ RVR on hospital day 2 after apneic episode overnight; received BIPAP QHS and was converted back to NSR. His amiodarone and metoprolol tartrate were continued from OSH.     CTS evaluated patient, not recommending surgical option due to high STS risk.     Interval History: No acute events overnight. No complaints this morning. Continues to have chronic cough. PFT and MANUEL today.     Review of Systems   Constitution: Negative for chills  and fever.   HENT: Negative for congestion and sore throat.    Eyes: Negative for photophobia and visual disturbance.   Cardiovascular: Positive for dyspnea on exertion. Negative for chest pain, irregular heartbeat, palpitations and syncope.   Respiratory: Positive for cough, shortness of breath and sputum production.    Skin: Negative for itching and rash.   Musculoskeletal: Negative for falls.   Gastrointestinal: Negative for constipation, diarrhea, nausea and vomiting.   Genitourinary: Negative for dysuria and flank pain.   Neurological: Negative for dizziness and headaches.   Psychiatric/Behavioral: Negative for altered mental status and memory loss.     Objective:     Vital Signs (Most Recent):  Temp: 98 °F (36.7 °C) (07/09/19 0834)  Pulse: (!) 52 (07/09/19 1119)  Resp: 17 (07/09/19 0834)  BP: 133/60 (07/09/19 0834)  SpO2: 95 % (07/09/19 0834) Vital Signs (24h Range):  Temp:  [97.5 °F (36.4 °C)-98.9 °F (37.2 °C)] 98 °F (36.7 °C)  Pulse:  [50-61] 52  Resp:  [12-22] 17  SpO2:  [90 %-98 %] 95 %  BP: (105-156)/(55-70) 133/60     Weight: 82.3 kg (181 lb 7 oz)  Body mass index is 28.42 kg/m².     SpO2: 95 %  O2 Device (Oxygen Therapy): nasal cannula w/ humidification      Intake/Output Summary (Last 24 hours) at 7/9/2019 1128  Last data filed at 7/8/2019 2155  Gross per 24 hour   Intake 538.3 ml   Output 1100 ml   Net -561.7 ml       Lines/Drains/Airways     Peripheral Intravenous Line                 Peripheral IV - Single Lumen 07/05/19 2200 22 G Anterior;Left Upper Arm 3 days         Peripheral IV - Single Lumen 07/06/19 1800 22 G Posterior;Right Hand 2 days              Physical Exam   Constitutional: He is oriented to person, place, and time. No distress.   HENT:   Head: Normocephalic and atraumatic.   Mouth/Throat: Oropharynx is clear and moist.   Eyes: Right eye exhibits no discharge. Left eye exhibits no discharge. No scleral icterus.   Neck: JVD present.   Cardiovascular: Normal rate and regular rhythm.    Murmur heard.  Pulmonary/Chest: Effort normal. No stridor. No respiratory distress. He has wheezes.   On NC   Abdominal: Soft. Bowel sounds are normal. He exhibits no distension.   Musculoskeletal: He exhibits edema (bilateral pitting edema to ankles). He exhibits no tenderness.   Neurological: He is alert and oriented to person, place, and time.   Skin: Skin is warm and dry. He is not diaphoretic.   Vitals reviewed.    Significant Labs:   Blood Culture:   Recent Labs   Lab 07/08/19  1412   LABBLOO No Growth to date  No Growth to date   , CMP   Recent Labs   Lab 07/08/19  0608 07/09/19  0457    140   K 4.4 3.9    103   CO2 31* 28    89   BUN 52* 58*   CREATININE 1.8* 2.0*   CALCIUM 9.0 8.7   PROT 5.8* 5.8*   ALBUMIN 2.9* 3.0*   BILITOT 0.7 0.8   ALKPHOS 55 53*   AST 15 15   ALT 27 25   ANIONGAP 9 9   ESTGFRAFRICA 41.6* 36.7*   EGFRNONAA 36.0* 31.7*   , CBC   Recent Labs   Lab 07/08/19  0608 07/09/19  0457   WBC 12.36 14.50*   HGB 7.9* 7.9*   HCT 29.2* 29.5*    135*    and All pertinent lab results from the last 24 hours have been reviewed.    Significant Imaging:   CXR 7/8/19:  FINDINGS:  Single chest view is submitted.  The cardiomediastinal silhouette appears stable.  There is improved aeration at the left lung base, and improved appearance of the right lung base with the previously identified areas of nodular opacity appearing improved.  There is no evidence for pneumothorax.  The osseous structures demonstrate chronic change.      Impression       Interval improvement.      Electronically signed by: Noe Burgess  Date: 07/09/2019  Time: 01:30                    Assessment and Plan:     * Acute on chronic heart failure  74 y.o. M w/ CAD, COPD on home O2, HTN, CVA, pAF (eliquis, amiodarone) presented w/ acute onset of SOB concerning for ADHF and COPD exacerbation. MANUEL found showed severe MR, moderate AS and TR with preserved LV function. Required BIPAP, nitroglycerin gtt and IV  diuresis at OSH.     - Clinically stable  - F/u CXR, BNP, electrolytes  - IV furosemide 40mg QD  - continue home lopressor 25mg BID, atorvastatin 40mg  - BIPAP QHS and when napping  - strict I/O  - daily weights  - stepped down 7/6/19    Pleural effusion  Pleural effusion noted on CXR; repeat imaging noted infiltrate and small pleural effusion at the left lung base, the overall pattern is that of progression. Suspect etiology related to volume status.     Plan:  - BCx, sputum gram stain + culture  - continue IV diuresis  - consider broad-spec abx if worsened respiratory status, fevers  - daily CXR    CAD (coronary artery disease)  Prior to this admission patient was admitted on 6/23/19  Where he underwent LHC which revealed severe single-vessel CAD s/p PCI; patent stented RCA, moderate AS.     - continue DAPT    Hypertension   at OSH requiring nitroglycerin gtt. Normotensive since [place] [facility] [place].     - holding home Norvasc for now    COPD exacerbation  50+ year smoking history; on home O2. Received nebs and solu-medrol at OSH.     Plan:  - duo-nebs prn  - oxygen support with NC, goal Sat 88%  - BIPAP QHS and prn  - steroid taper  - repeat CXR; restart abx if signs of PNA    Paroxysmal atrial fibrillation  CHADS-VASc 7 - 15.7% risk of stroke/TIA/systemic embolism  Continue amiodarone 400mg BID, metoprolol tartrate 25mg BID  Went into Afib on 7/6/19, HR <115. On heparin gtt.   Transition back to [facility] when appropriate    Severe aortic stenosis  TTE at OSH showed severe MR, moderate AS and TR with preserved LV function. Patient is transferred to [facility] for further evaluation of possible MVR/TVR/AVR vs TAVR/MV clip in a stable condition by [facility].     Plan:  - No surgical option as patient is high STS score  - Evaluated by Interventional Cardiology; will perform MANUEL 7/10/19 as part of TAVR workup  - pending imaging records from [facility]        VTE Risk Mitigation (From admission, onward)        Ordered      heparin 25,000 units in dextrose 5% 250 mL (100 units/mL) infusion LOW INTENSITY nomogram - [facility]  Continuous      07/05/19 1152     heparin 25,000 units in dextrose 5% (100 units/ml) IV bolus from bag - ADDITIONAL PRN BOLUS - 30 units/kg  As needed (PRN)      07/05/19 1152     heparin 25,000 units in dextrose 5% (100 units/ml) IV bolus from bag - ADDITIONAL PRN BOLUS - 60 units/kg  As needed (PRN)      07/05/19 1152     IP VTE HIGH RISK PATIENT  Once      07/05/19 1148          Cierra Mullen MD  Cardiology  [facility]

## 2019-07-09 NOTE — PLAN OF CARE
Problem: Adult Inpatient Plan of Care  Goal: Plan of Care Review  Outcome: Ongoing (interventions implemented as appropriate)  Pt remained free of falls, trauma, injury. VSS; asymptomatic. Pt put on biPAP last night; tolerated for about half the night then requested it to come off. Pt given PRN breathing treatment. Pt skin remains CDI. Reviewed plan of care with pt and wife; answered all questions. Pt tolerating plan of care; will continue to monitor.

## 2019-07-09 NOTE — ASSESSMENT & PLAN NOTE
50+ year smoking history; on home O2. Received nebs and solu-medrol at OSH.     Plan:  - duo-nebs prn  - oxygen support with NC, goal Sat 88%  - BIPAP QHS and prn  - steroid taper  - repeat CXR; restart abx if signs of PNA

## 2019-07-09 NOTE — ANESTHESIA PREPROCEDURE EVALUATION
[facility]  Anesthesia Pre-Operative Evaluation         Patient Name: Wiley Alfred  YOB: 1945  MRN: [id number]    SUBJECTIVE:     Pre-operative evaluation for Procedure(s) (LRB):  ECHOCARDIOGRAM, TRANSESOPHAGEAL (N/A)     07/09/2019    Wiley Alfred is a 74 y.o. male w/ a significant PMHx of severe AS, severe MR, severe COPD, HFpEF, HTN, paroxysmal afib (on apixaban and amiodarone), CAD (s/p RCA PCI 6/[id number]), CKD3, initially presented to OSH for hypoxic and hypercarbic respiratory failure, suspected to be COPD vs CHF exacerbation resolved with diuresis and NIPPV. [place] [facility] [place]. Not surgical candidate per CTS, now undergoing TAVR eval. Scheduled to undergo MANUEL for AV sizing.     Patient now presents for the above procedure(s).      LDA:        Peripheral IV - Single Lumen 07/05/19 2200 22 G Anterior;Left Upper Arm (Active)   Site Assessment Clean;Dry;Intact 7/9/2019  8:00 AM   Line Status Saline locked 7/8/2019  8:08 PM   Dressing Status Clean;Dry;Intact 7/8/2019  8:08 PM   Dressing Intervention New dressing 7/8/2019  8:08 PM   Dressing Change Due 07/09/19 7/8/2019  8:08 PM   Site Change Due 07/09/19 7/8/2019  8:08 PM   Reason Not Rotated Not due 7/8/2019  8:08 PM   Number of days: 3            Peripheral IV - Single Lumen 07/06/19 1800 22 G Posterior;Right Hand (Active)   Site Assessment Clean;Dry;Intact 7/9/2019  8:00 AM   Line Status Infusing 7/8/2019  8:08 PM   Dressing Status Clean;Dry;Intact 7/8/2019  8:08 PM   Dressing Intervention New dressing 7/8/2019  8:08 PM   Dressing Change Due 07/10/19 7/8/2019  8:08 PM   Site Change Due 07/10/19 7/8/2019  8:08 PM   Reason Not Rotated Not due 7/8/2019  8:08 PM   Number of days: 2       Prev airway: None documented.    Drips:    heparin (porcine) in D5W 14 Units/kg/hr (07/09/19 0604)       Patient Active Problem List   Diagnosis    Acute on chronic heart failure    Severe aortic stenosis     Paroxysmal atrial fibrillation    COPD exacerbation    Hypertension    CAD (coronary artery disease)    Non-rheumatic mitral regurgitation    Pleural effusion       Review of patient's allergies indicates:   Allergen Reactions    Bactrim [sulfamethoxazole-trimethoprim] Blisters    Bacitracin/polymyxin Other (See Comments)     inflammation       Current Inpatient Medications:   amiodarone  400 mg Oral BID    aspirin  81 mg Oral Daily    clopidogrel  75 mg Oral Daily    fluticasone-umeclidin-vilanter  1 puff Inhalation Daily    guaiFENesin  600 mg Oral Daily    metoprolol tartrate  25 mg Oral BID    pantoprazole  40 mg Oral Daily    polyethylene glycol  17 g Oral Daily    predniSONE  5 mg Oral Daily    rosuvastatin  20 mg Oral QHS       No current facility-administered medications on file prior to encounter.      Current Outpatient Medications on File Prior to Encounter   Medication Sig Dispense Refill    albuterol (ACCUNEB) 1.25 mg/3 mL Nebu Take 2.5 mg by nebulization every 6 (six) hours as needed. Rescue      albuterol (PROAIR HFA) 90 mcg/actuation inhaler Inhale 2 puffs into the lungs every 6 (six) hours as needed for Wheezing. Rescue      amiodarone (PACERONE) 200 MG Tab Take 400 mg by mouth 2 (two) times daily.      amLODIPine (NORVASC) 10 MG tablet Take 10 mg by mouth once daily.      apixaban (ELIQUIS) 5 mg Tab Take 5 mg by mouth 2 (two) times daily.      fluticasone-umeclidin-vilanter (TRELEGY ELLIPTA) 100-62.5-25 mcg DsDv Inhale 1 puff into the lungs once daily.      furosemide (LASIX) 40 MG tablet Take 40 mg by mouth once daily.      guaiFENesin (MUCINEX) 600 mg 12 hr tablet Take 600 mg by mouth once daily.      ipratropium (ATROVENT) 0.02 % nebulizer solution Take 500 mcg by nebulization 4 (four) times daily. Rescue      lansoprazole (PREVACID) 30 MG capsule Take 30 mg by mouth once daily.      methylPREDNISolone (MEDROL DOSEPACK) 4 mg tablet Take 4 mg by mouth. use as directed       metoprolol tartrate (LOPRESSOR) 25 MG tablet Take 25 mg by mouth 2 (two) times daily.      nitroGLYCERIN (NITROSTAT) 0.4 MG SL tablet Place 0.4 mg under the tongue every 5 (five) minutes as needed for Chest pain.      potassium chloride (KLOR-CON) 10 MEQ TbSR Take 20 mEq by mouth once.      rosuvastatin (CRESTOR) 20 MG tablet Take 20 mg by mouth once daily.         History reviewed. No pertinent surgical history.    Social History     Socioeconomic History    Marital status:      Spouse name: Not on file    Number of children: Not on file    Years of education: Not on file    Highest education level: Not on file   Occupational History    Not on file   Social Needs    Financial resource strain: Not on file    Food insecurity:     Worry: Not on file     Inability: Not on file    Transportation needs:     Medical: Not on file     Non-medical: Not on file   Tobacco Use    Smoking status: Former Smoker    Smokeless tobacco: Former User   Substance and Sexual Activity    Alcohol use: Not on file    Drug use: Not on file    Sexual activity: Not on file   Lifestyle    Physical activity:     Days per week: Not on file     Minutes per session: Not on file    Stress: Not on file   Relationships    Social connections:     Talks on phone: Not on file     Gets together: Not on file     Attends Congregational service: Not on file     Active member of club or organization: Not on file     Attends meetings of clubs or organizations: Not on file     Relationship status: Not on file   Other Topics Concern    Not on file   Social History Narrative    Not on file       OBJECTIVE:     Vital Signs Range (Last 24H):  Temp:  [36.4 °C (97.5 °F)-37.2 °C (98.9 °F)]   Pulse:  [50-61]   Resp:  [12-22]   BP: (105-156)/(55-70)   SpO2:  [90 %-98 %]       Significant Labs:  Lab Results   Component Value Date    WBC 14.50 (H) 07/09/2019    HGB 7.9 (L) 07/09/2019    HCT 29.5 (L) 07/09/2019     (L) 07/09/2019    ALT  25 07/09/2019    AST 15 07/09/2019     07/09/2019    K 3.9 07/09/2019     07/09/2019    CREATININE 2.0 (H) 07/09/2019    BUN 58 (H) 07/09/2019    CO2 28 07/09/2019    INR 1.1 07/08/2019       Diagnostic Studies: CXR 7/8/2019  Narrative     EXAMINATION:  XR CHEST AP PORTABLE    CLINICAL HISTORY:  pleural effusion;    TECHNIQUE:  Single frontal view of the chest was performed.    COMPARISON:  July 7, 2019 744 hours    FINDINGS:  Single chest view is submitted.  The cardiomediastinal silhouette appears stable.  There is improved aeration at the left lung base, and improved appearance of the right lung base with the previously identified areas of nodular opacity appearing improved.  There is no evidence for pneumothorax.  The osseous structures demonstrate chronic change.      Impression       Interval improvement.      Electronically signed by: Noe Burgess  Date: 07/09/2019  Time: 01:30         EKG:   Results for orders placed or performed during the hospital encounter of 07/05/19   EKG 12-lead    Collection Time: 07/07/19 11:20 AM    Narrative    Test Reason : I49.9,    Vent. Rate : 111 BPM     Atrial Rate : 136 BPM     P-R Int : 000 ms          QRS Dur : 110 ms      QT Int : 430 ms       P-R-T Axes : 000 -29 065 degrees     QTc Int : 584 ms    Atrial fibrillation with rapid ventricular response  Prolonged QT  Abnormal ECG  When compared with ECG of 06-JUL-2019 02:02,  No significant change was found  Confirmed by Zully Herbert MD ([id number]) on 7/8/2019 3:31:45 PM    Referred By: HUMBLE BOYLE           Confirmed By:Zully Herbert MD       2D ECHO:  7/5/2019  · Mild left ventricular enlargement.  · Normal left ventricular systolic function. The estimated ejection fraction is 68%  · Mild eccentric left ventricular hypertrophy.  · Indeterminate left ventricular diastolic function.  · Normal right ventricular systolic function.  · Severe left atrial enlargement.  · Moderate right atrial  enlargement.  · Moderate-to-severe aortic valve stenosis.  · Aortic valve area is 0.99 cm2; peak velocity is 4.14 m/s; mean gradient is 44 mmHg.  · Mild mitral sclerosis.  · Moderate-to-severe mitral regurgitation.  · Moderate to severe tricuspid regurgitation.  · Mild aortic regurgitation.  · Normal central venous pressure (3 mm Hg).  · The estimated PA systolic pressure is 61 mm Hg  · Pulmonary hypertension present.      ASSESSMENT/PLAN:         Anesthesia Evaluation    I have reviewed the Patient Summary Reports.    I have reviewed the Nursing Notes.      Review of Systems  Anesthesia Hx:  No problems with previous Anesthesia Denies Hx of Anesthetic complications    Social:  Former Smoker    Hematology/Oncology:     Oncology Normal    -- Anemia:   EENT/Dental:EENT/Dental Normal   Cardiovascular:   Exercise tolerance: poor Hypertension, well controlled Valvular problems/Murmurs, AS, AI, MR CAD  Dysrhythmias atrial fibrillation    Pulmonary:   COPD, severe    Renal/:   Chronic Renal Disease, CRI CKD 3   Hepatic/GI:   GERD, well controlled    Neurological:   Denies CVA. Denies Seizures.    Endocrine:   Denies Diabetes. Denies Hypothyroidism.    Psych:  Psychiatric Normal           Physical Exam  General:  Ill appearing    Airway/Jaw/Neck:  Airway Findings: Mouth Opening: Normal Tongue: Normal  General Airway Assessment: Adult  Mallampati: II  TM Distance: Normal, at least 6 cm  Jaw/Neck Findings:  Micrognathia: Negative Neck ROM: Normal ROM     Eyes/Ears/Nose:  EYES/EARS/NOSE FINDINGS: Normal   Dental:  Dental Findings: In tact   Chest/Lungs:  Chest/Lungs Findings: Normal Respiratory Rate, Expiratory Wheezes, Mod.     Heart/Vascular:  Heart Findings: Rate: Normal  Rhythm: Regular Rhythm  Heart Murmur  Systolic  Systolic Heart Murmur Description: L Upper Sternal Border        Mental Status:  Mental Status Findings:  Cooperative, Alert and Oriented         Anesthesia Plan  Type of Anesthesia, risks & benefits  discussed:  Anesthesia Type:  general, MAC  Patient's Preference:   Intra-op Monitoring Plan: standard ASA monitors  Intra-op Monitoring Plan Comments:   Post Op Pain Control Plan: per primary service following discharge from PACU  Post Op Pain Control Plan Comments:   Induction:   IV  Beta Blocker:  Patient is on a Beta-Blocker and has received one dose within the past 24 hours (No further documentation required).       Informed Consent: Patient understands risks and agrees with Anesthesia plan.  Questions answered. Anesthesia consent signed with patient.  ASA Score: 4     Day of Surgery Review of History & Physical:            Ready For Surgery From Anesthesia Perspective.

## 2019-07-09 NOTE — CONSULTS
[facility]  Interventional Cardiology  Consult Note    SEE PREVIOUS CONSULT NOTE.

## 2019-07-09 NOTE — ASSESSMENT & PLAN NOTE
TTE at OSH showed severe MR, moderate AS and TR with preserved LV function. Patient is transferred to [facility] for further evaluation of possible MVR/TVR/AVR vs TAVR/MV clip in a stable condition by [facility].     Plan:  - No surgical option as patient is high STS score  - Evaluated by Interventional Cardiology; will perform MANUEL 7/10/19 as part of TAVR workup  - pending imaging records from [facility]

## 2019-07-09 NOTE — SUBJECTIVE & OBJECTIVE
Review of Systems   Constitution: Negative for chills and fever.   HENT: Negative for congestion and sore throat.    Eyes: Negative for photophobia and visual disturbance.   Cardiovascular: Positive for dyspnea on exertion. Negative for chest pain, irregular heartbeat, palpitations and syncope.   Respiratory: Positive for cough, shortness of breath and sputum production.    Skin: Negative for itching and rash.   Musculoskeletal: Negative for falls.   Gastrointestinal: Negative for constipation, diarrhea, nausea and vomiting.   Genitourinary: Negative for dysuria and flank pain.   Neurological: Negative for dizziness and headaches.   Psychiatric/Behavioral: Negative for altered mental status and memory loss.     Objective:     Vital Signs (Most Recent):  Temp: 97.9 °F (36.6 °C) (07/09/19 0533)  Pulse: (!) 57 (07/09/19 0706)  Resp: 12 (07/09/19 0533)  BP: (!) 118/58 (07/09/19 0533)  SpO2: 98 % (07/09/19 0533) Vital Signs (24h Range):  Temp:  [97.5 °F (36.4 °C)-98.9 °F (37.2 °C)] 97.9 °F (36.6 °C)  Pulse:  [50-62] 57  Resp:  [12-22] 12  SpO2:  [90 %-100 %] 98 %  BP: (105-156)/(55-70) 118/58     Weight: 82.3 kg (181 lb 7 oz)  Body mass index is 28.42 kg/m².     SpO2: 98 %  O2 Device (Oxygen Therapy): nasal cannula w/ humidification      Intake/Output Summary (Last 24 hours) at 7/9/2019 0808  Last data filed at 7/8/2019 2155  Gross per 24 hour   Intake 538.3 ml   Output 1100 ml   Net -561.7 ml       Lines/Drains/Airways     Peripheral Intravenous Line                 Peripheral IV - Single Lumen 07/05/19 2200 22 G Anterior;Left Upper Arm 3 days         Peripheral IV - Single Lumen 07/06/19 1800 22 G Posterior;Right Hand 2 days                Physical Exam   Constitutional: He is oriented to person, place, and time. No distress.   HENT:   Head: Normocephalic and atraumatic.   Mouth/Throat: Oropharynx is clear and moist.   Eyes: Right eye exhibits no discharge. Left eye exhibits no discharge. No scleral icterus.   Neck:  JVD present.   Cardiovascular: Normal rate and regular rhythm.   Murmur heard.  Pulmonary/Chest: Effort normal. No stridor. No respiratory distress. He has wheezes.   On NC   Abdominal: Soft. Bowel sounds are normal. He exhibits no distension.   Musculoskeletal: He exhibits edema (bilateral pitting edema to ankles). He exhibits no tenderness.   Neurological: He is alert and oriented to person, place, and time.   Skin: Skin is warm and dry. He is not diaphoretic.   Vitals reviewed.    Significant Labs:   CMP   Recent Labs   Lab 07/08/19  0608 07/09/19 0457    140   K 4.4 3.9    103   CO2 31* 28    89   BUN 52* 58*   CREATININE 1.8* 2.0*   CALCIUM 9.0 8.7   PROT 5.8* 5.8*   ALBUMIN 2.9* 3.0*   BILITOT 0.7 0.8   ALKPHOS 55 53*   AST 15 15   ALT 27 25   ANIONGAP 9 9   ESTGFRAFRICA 41.6* 36.7*   EGFRNONAA 36.0* 31.7*   , CBC   Recent Labs   Lab 07/08/19  0608 07/09/19 0457   WBC 12.36 14.50*   HGB 7.9* 7.9*   HCT 29.2* 29.5*    135*    and All pertinent lab results from the last 24 hours have been reviewed.    Significant Imaging:     CXR:  Impression       Bilateral pulmonary vascular prominence, areas of opacity likely relating to infiltrate and small pleural effusion at the left lung base, the overall pattern is that of progression, follow-up to document resolution as discussed above is recommended.      Electronically signed by: Noe Burgess  Date: 07/07/2019  Time: 08:05

## 2019-07-09 NOTE — CONSULTS
[facility]  MANUEL Cardiology  Consult Note    Patient Name: Wiley Alfred  MRN: [id number]  Admission Date: 7/5/2019  Hospital Length of Stay: 4 days  Code Status: Full Code   Attending Provider: Aaron Bacon MD   Consulting Provider: Leeanna Wong MD  Primary Care Physician: Daniel Almeida MD  Principal Problem:Acute on chronic heart failure    Patient information was obtained from patient and ER records.     Consults  Subjective:     HPI: Wiley Alfred is a 75 yo M with PMHx significant for severe AS (NYHA class IV), CAD s/p PCI, CVA, pAF (on Eliquis), COPD who is admitted on 7/5 with acute decompensated heart failure found to have significant valvular disease at OSH. [place] [facility] [place] and TAVR [place]. Interventional Cardiology requesting 3D MANUEL for valve sizing as part of TAVR work up. Cr 2.0.    Dysphagia or odynophagia:  No  Liver Disease, esophageal disease, or known varices:  No  Upper GI Bleeding: No  Snoring:  Yes  Sleep Apnea:  Yes  Prior neck surgery or radiation:  No  History of anesthetic difficulties:  No  Family history of anesthetic difficulties:  No  Last oral intake:  8 AM on 7/9  Able to move neck in all directions:  Yes        2D echo 7/5/2019: PER REPORT:  · Mild left ventricular enlargement.  · Normal left ventricular systolic function. The estimated ejection fraction is 68%  · Mild eccentric left ventricular hypertrophy.  · Indeterminate left ventricular diastolic function.  · Normal right ventricular systolic function.  · Severe left atrial enlargement.  · Moderate right atrial enlargement.  · Moderate-to-severe aortic valve stenosis.  · Aortic valve area is 0.99 cm2; peak velocity is 4.14 m/s; mean gradient is 44 mmHg.  · Mild mitral sclerosis.  · Moderate-to-severe mitral regurgitation.  · Moderate to severe tricuspid regurgitation.  · Mild aortic regurgitation.  · Normal central venous pressure (3 mm Hg).  · The estimated PA  systolic pressure is 61 mm Hg  · Pulmonary hypertension present.       Review of Systems   Constitution: Negative for chills and fever.   HENT: Negative for congestion and sore throat.    Eyes: Negative for photophobia and visual disturbance.   Cardiovascular: Positive for dyspnea on exertion. Negative for chest pain, irregular heartbeat, palpitations and syncope.   Respiratory: Positive for cough, shortness of breath and sputum production.    Skin: Negative for itching and rash.   Musculoskeletal: Negative for falls.   Gastrointestinal: Negative for constipation, diarrhea, nausea and vomiting.   Genitourinary: Negative for dysuria and flank pain.   Neurological: Negative for dizziness and headaches.   Psychiatric/Behavioral: Negative for altered mental status and memory loss.     Objective:     Vital Signs (Most Recent):  Temp: 97.9 °F (36.6 °C) (07/09/19 0533)  Pulse: (!) 57 (07/09/19 0706)  Resp: 12 (07/09/19 0533)  BP: (!) 118/58 (07/09/19 0533)  SpO2: 98 % (07/09/19 0533) Vital Signs (24h Range):  Temp:  [97.5 °F (36.4 °C)-98.9 °F (37.2 °C)] 97.9 °F (36.6 °C)  Pulse:  [50-62] 57  Resp:  [12-22] 12  SpO2:  [90 %-100 %] 98 %  BP: (105-156)/(55-70) 118/58     Weight: 82.3 kg (181 lb 7 oz)  Body mass index is 28.42 kg/m².     SpO2: 98 %  O2 Device (Oxygen Therapy): nasal cannula w/ humidification      Intake/Output Summary (Last 24 hours) at 7/9/2019 0808  Last data filed at 7/8/2019 2155  Gross per 24 hour   Intake 538.3 ml   Output 1100 ml   Net -561.7 ml       Lines/Drains/Airways     Peripheral Intravenous Line                 Peripheral IV - Single Lumen 07/05/19 2200 22 G Anterior;Left Upper Arm 3 days         Peripheral IV - Single Lumen 07/06/19 1800 22 G Posterior;Right Hand 2 days                Physical Exam   Constitutional: He is oriented to person, place, and time. No distress.   HENT:   Head: Normocephalic and atraumatic.   Mouth/Throat: Oropharynx is clear and moist.   Eyes: Right eye exhibits no  discharge. Left eye exhibits no discharge. No scleral icterus.   Neck: JVD present.   Cardiovascular: Normal rate and regular rhythm.   Murmur heard.  Pulmonary/Chest: Effort normal. No stridor. No respiratory distress. He has wheezes.   On NC   Abdominal: Soft. Bowel sounds are normal. He exhibits no distension.   Musculoskeletal: He exhibits edema (bilateral pitting edema to ankles). He exhibits no tenderness.   Neurological: He is alert and oriented to person, place, and time.   Skin: Skin is warm and dry. He is not diaphoretic.   Vitals reviewed.    Significant Labs:   CMP   Recent Labs   Lab 07/08/19  0608 07/09/19  0457    140   K 4.4 3.9    103   CO2 31* 28    89   BUN 52* 58*   CREATININE 1.8* 2.0*   CALCIUM 9.0 8.7   PROT 5.8* 5.8*   ALBUMIN 2.9* 3.0*   BILITOT 0.7 0.8   ALKPHOS 55 53*   AST 15 15   ALT 27 25   ANIONGAP 9 9   ESTGFRAFRICA 41.6* 36.7*   EGFRNONAA 36.0* 31.7*   , CBC   Recent Labs   Lab 07/08/19  0608 07/09/19  0457   WBC 12.36 14.50*   HGB 7.9* 7.9*   HCT 29.2* 29.5*    135*    and All pertinent lab results from the last 24 hours have been reviewed.    Significant Imaging:     CXR:  Impression       Bilateral pulmonary vascular prominence, areas of opacity likely relating to infiltrate and small pleural effusion at the left lung base, the overall pattern is that of progression, follow-up to document resolution as discussed above is recommended.      Electronically signed by: Noe Burgess  Date: 07/07/2019  Time: 08:05     Assessment and Plan:     Severe aortic stenosis  1. 3D MANUEL for evaluation of aortic valve as part of TAVR work up.     Patient had PO intake this morning. Plan for MANUEL tomorrow 7/10 (around 10 AM). Make NPO > MN tonight.    -No absolute contraindications of esophageal stricture, tumor, perforation, laceration,or diverticulum and/or active GI bleed  -The risks, benefits & alternatives of the procedure were explained to the patient.   -The risks of  transesophageal echo include but are not limited to:  Dental trauma, esophageal trauma/perforation, bleeding, laryngospasm/brochospasm, aspiration, sore throat/hoarseness, & dislodgement of the endotracheal tube/nasogastric tube (where applicable).    -The risks of moderate sedation include hypotension, respiratory depression, arrhythmias, bronchospasm, & death.    -Informed consent was obtained. The patient is agreeable to proceed with the procedure and all questions and concerns addressed.    Case discussed with an attending in echocardiography lab.     Further recommendations per attending addendum        Thank you for your consult.      Leeanna Wong MD  Cardiology   [facility]

## 2019-07-10 PROBLEM — J18.9 PNEUMONIA INVOLVING RIGHT LUNG: Status: ACTIVE | Noted: 2019-07-10

## 2019-07-10 LAB
ALBUMIN SERPL BCP-MCNC: 2.9 G/DL (ref 3.5–5.2)
ALLENS TEST: ABNORMAL
ALP SERPL-CCNC: 54 U/L (ref 55–135)
ALT SERPL W/O P-5'-P-CCNC: 24 U/L (ref 10–44)
ANION GAP SERPL CALC-SCNC: 13 MMOL/L (ref 8–16)
APTT BLDCRRT: 54.8 SEC (ref 21–32)
AST SERPL-CCNC: 17 U/L (ref 10–40)
BASOPHILS # BLD AUTO: 0.02 K/UL (ref 0–0.2)
BASOPHILS NFR BLD: 0.1 % (ref 0–1.9)
BILIRUB SERPL-MCNC: 0.5 MG/DL (ref 0.1–1)
BUN SERPL-MCNC: 52 MG/DL (ref 8–23)
CALCIUM SERPL-MCNC: 9.1 MG/DL (ref 8.7–10.5)
CHLORIDE SERPL-SCNC: 104 MMOL/L (ref 95–110)
CO2 SERPL-SCNC: 23 MMOL/L (ref 23–29)
CREAT SERPL-MCNC: 1.7 MG/DL (ref 0.5–1.4)
DELSYS: ABNORMAL
DIFFERENTIAL METHOD: ABNORMAL
DOP CALC LVOT AREA: 3.8 CM2
DOP CALC LVOT DIAMETER: 2.2 CM
EOSINOPHIL # BLD AUTO: 0.3 K/UL (ref 0–0.5)
EOSINOPHIL NFR BLD: 1.9 % (ref 0–8)
ERYTHROCYTE [DISTWIDTH] IN BLOOD BY AUTOMATED COUNT: 20.7 % (ref 11.5–14.5)
EST. GFR  (AFRICAN AMERICAN): 44.6 ML/MIN/1.73 M^2
EST. GFR  (NON AFRICAN AMERICAN): 38.6 ML/MIN/1.73 M^2
GLUCOSE SERPL-MCNC: 96 MG/DL (ref 70–110)
HCO3 UR-SCNC: 27.4 MMOL/L (ref 24–28)
HCT VFR BLD AUTO: 28.1 % (ref 40–54)
HGB BLD-MCNC: 7.8 G/DL (ref 14–18)
IMM GRANULOCYTES # BLD AUTO: 0.21 K/UL (ref 0–0.04)
IMM GRANULOCYTES NFR BLD AUTO: 1.2 % (ref 0–0.5)
LYMPHOCYTES # BLD AUTO: 2.1 K/UL (ref 1–4.8)
LYMPHOCYTES NFR BLD: 12.5 % (ref 18–48)
MAGNESIUM SERPL-MCNC: 2.4 MG/DL (ref 1.6–2.6)
MCH RBC QN AUTO: 19.3 PG (ref 27–31)
MCHC RBC AUTO-ENTMCNC: 27.8 G/DL (ref 32–36)
MCV RBC AUTO: 70 FL (ref 82–98)
MONOCYTES # BLD AUTO: 1.2 K/UL (ref 0.3–1)
MONOCYTES NFR BLD: 7 % (ref 4–15)
NEUTROPHILS # BLD AUTO: 13.2 K/UL (ref 1.8–7.7)
NEUTROPHILS NFR BLD: 77.3 % (ref 38–73)
NRBC BLD-RTO: 0 /100 WBC
PCO2 BLDA: 35.6 MMHG (ref 35–45)
PH SMN: 7.49 [PH] (ref 7.35–7.45)
PHOSPHATE SERPL-MCNC: 4.4 MG/DL (ref 2.7–4.5)
PLATELET # BLD AUTO: 137 K/UL (ref 150–350)
PMV BLD AUTO: 11.6 FL (ref 9.2–12.9)
PO2 BLDA: 92 MMHG (ref 80–100)
POC BE: 4 MMOL/L
POC SATURATED O2: 98 % (ref 95–100)
POC TCO2: 28 MMOL/L (ref 23–27)
POTASSIUM SERPL-SCNC: 4.3 MMOL/L (ref 3.5–5.1)
PROCALCITONIN SERPL IA-MCNC: 0.05 NG/ML
PROT SERPL-MCNC: 5.7 G/DL (ref 6–8.4)
RBC # BLD AUTO: 4.04 M/UL (ref 4.6–6.2)
SAMPLE: ABNORMAL
SITE: ABNORMAL
SODIUM SERPL-SCNC: 140 MMOL/L (ref 136–145)
WBC # BLD AUTO: 17.13 K/UL (ref 3.9–12.7)

## 2019-07-10 PROCEDURE — 36415 COLL VENOUS BLD VENIPUNCTURE: CPT

## 2019-07-10 PROCEDURE — D9220A PRA ANESTHESIA: Mod: CRNA,,, | Performed by: NURSE ANESTHETIST, CERTIFIED REGISTERED

## 2019-07-10 PROCEDURE — 63600175 PHARM REV CODE 636 W HCPCS: Performed by: STUDENT IN AN ORGANIZED HEALTH CARE EDUCATION/TRAINING PROGRAM

## 2019-07-10 PROCEDURE — D9220A PRA ANESTHESIA: ICD-10-PCS | Mod: CRNA,,, | Performed by: NURSE ANESTHETIST, CERTIFIED REGISTERED

## 2019-07-10 PROCEDURE — 94761 N-INVAS EAR/PLS OXIMETRY MLT: CPT

## 2019-07-10 PROCEDURE — 63600175 PHARM REV CODE 636 W HCPCS: Performed by: NURSE ANESTHETIST, CERTIFIED REGISTERED

## 2019-07-10 PROCEDURE — 25000242 PHARM REV CODE 250 ALT 637 W/ HCPCS

## 2019-07-10 PROCEDURE — 82803 BLOOD GASES ANY COMBINATION: CPT

## 2019-07-10 PROCEDURE — 36600 WITHDRAWAL OF ARTERIAL BLOOD: CPT

## 2019-07-10 PROCEDURE — 87040 BLOOD CULTURE FOR BACTERIA: CPT | Mod: 59

## 2019-07-10 PROCEDURE — 85730 THROMBOPLASTIN TIME PARTIAL: CPT

## 2019-07-10 PROCEDURE — 25000003 PHARM REV CODE 250

## 2019-07-10 PROCEDURE — 84100 ASSAY OF PHOSPHORUS: CPT

## 2019-07-10 PROCEDURE — D9220A PRA ANESTHESIA: Mod: ANES,,, | Performed by: ANESTHESIOLOGY

## 2019-07-10 PROCEDURE — 84145 PROCALCITONIN (PCT): CPT

## 2019-07-10 PROCEDURE — 25000003 PHARM REV CODE 250: Performed by: NURSE ANESTHETIST, CERTIFIED REGISTERED

## 2019-07-10 PROCEDURE — 25000242 PHARM REV CODE 250 ALT 637 W/ HCPCS: Performed by: STUDENT IN AN ORGANIZED HEALTH CARE EDUCATION/TRAINING PROGRAM

## 2019-07-10 PROCEDURE — 94640 AIRWAY INHALATION TREATMENT: CPT

## 2019-07-10 PROCEDURE — 80053 COMPREHEN METABOLIC PANEL: CPT

## 2019-07-10 PROCEDURE — D9220A PRA ANESTHESIA: ICD-10-PCS | Mod: ANES,,, | Performed by: ANESTHESIOLOGY

## 2019-07-10 PROCEDURE — 83735 ASSAY OF MAGNESIUM: CPT

## 2019-07-10 PROCEDURE — 25000003 PHARM REV CODE 250: Performed by: INTERNAL MEDICINE

## 2019-07-10 PROCEDURE — 85025 COMPLETE CBC W/AUTO DIFF WBC: CPT

## 2019-07-10 PROCEDURE — 99233 SBSQ HOSP IP/OBS HIGH 50: CPT | Mod: ,,, | Performed by: INTERNAL MEDICINE

## 2019-07-10 PROCEDURE — 99233 PR SUBSEQUENT HOSPITAL CARE,LEVL III: ICD-10-PCS | Mod: ,,, | Performed by: INTERNAL MEDICINE

## 2019-07-10 PROCEDURE — 20600001 HC STEP DOWN PRIVATE ROOM

## 2019-07-10 PROCEDURE — 27000221 HC OXYGEN, UP TO 24 HOURS

## 2019-07-10 PROCEDURE — 25000003 PHARM REV CODE 250: Performed by: STUDENT IN AN ORGANIZED HEALTH CARE EDUCATION/TRAINING PROGRAM

## 2019-07-10 PROCEDURE — 63600175 PHARM REV CODE 636 W HCPCS

## 2019-07-10 PROCEDURE — 94660 CPAP INITIATION&MGMT: CPT

## 2019-07-10 PROCEDURE — 99900035 HC TECH TIME PER 15 MIN (STAT)

## 2019-07-10 RX ORDER — PROPOFOL 10 MG/ML
VIAL (ML) INTRAVENOUS
Status: DISCONTINUED | OUTPATIENT
Start: 2019-07-10 | End: 2019-07-10

## 2019-07-10 RX ORDER — VANCOMYCIN 2 GRAM/500 ML IN 0.9 % SODIUM CHLORIDE INTRAVENOUS
2000 ONCE
Status: COMPLETED | OUTPATIENT
Start: 2019-07-10 | End: 2019-07-10

## 2019-07-10 RX ORDER — IPRATROPIUM BROMIDE AND ALBUTEROL SULFATE 2.5; .5 MG/3ML; MG/3ML
3 SOLUTION RESPIRATORY (INHALATION) ONCE
Status: COMPLETED | OUTPATIENT
Start: 2019-07-10 | End: 2019-07-10

## 2019-07-10 RX ORDER — MINOCYCLINE HYDROCHLORIDE 50 MG/1
100 CAPSULE ORAL EVERY 12 HOURS
Status: DISCONTINUED | OUTPATIENT
Start: 2019-07-11 | End: 2019-07-10

## 2019-07-10 RX ORDER — AZITHROMYCIN 250 MG/1
500 TABLET, FILM COATED ORAL DAILY
Status: DISCONTINUED | OUTPATIENT
Start: 2019-07-10 | End: 2019-07-10

## 2019-07-10 RX ORDER — MINOCYCLINE HYDROCHLORIDE 100 MG/1
200 CAPSULE ORAL ONCE
Status: COMPLETED | OUTPATIENT
Start: 2019-07-10 | End: 2019-07-10

## 2019-07-10 RX ORDER — EPHEDRINE SULFATE 50 MG/ML
INJECTION, SOLUTION INTRAVENOUS
Status: DISCONTINUED | OUTPATIENT
Start: 2019-07-10 | End: 2019-07-10

## 2019-07-10 RX ORDER — HYDROMORPHONE HYDROCHLORIDE 1 MG/ML
0.2 INJECTION, SOLUTION INTRAMUSCULAR; INTRAVENOUS; SUBCUTANEOUS EVERY 5 MIN PRN
Status: DISCONTINUED | OUTPATIENT
Start: 2019-07-10 | End: 2019-07-11

## 2019-07-10 RX ORDER — DEXMEDETOMIDINE HYDROCHLORIDE 100 UG/ML
INJECTION, SOLUTION INTRAVENOUS
Status: DISCONTINUED | OUTPATIENT
Start: 2019-07-10 | End: 2019-07-10

## 2019-07-10 RX ORDER — AMIODARONE HYDROCHLORIDE 200 MG/1
400 TABLET ORAL DAILY
Status: DISCONTINUED | OUTPATIENT
Start: 2019-07-10 | End: 2019-07-10

## 2019-07-10 RX ORDER — DOXYCYCLINE HYCLATE 100 MG
100 TABLET ORAL EVERY 12 HOURS
Status: DISCONTINUED | OUTPATIENT
Start: 2019-07-10 | End: 2019-07-10

## 2019-07-10 RX ORDER — AMIODARONE HYDROCHLORIDE 200 MG/1
200 TABLET ORAL DAILY
Status: DISCONTINUED | OUTPATIENT
Start: 2019-07-11 | End: 2019-07-12 | Stop reason: HOSPADM

## 2019-07-10 RX ORDER — EPINEPHRINE 1 MG/ML
INJECTION, SOLUTION INTRACARDIAC; INTRAMUSCULAR; INTRAVENOUS; SUBCUTANEOUS
Status: DISCONTINUED | OUTPATIENT
Start: 2019-07-10 | End: 2019-07-10

## 2019-07-10 RX ORDER — FUROSEMIDE 10 MG/ML
80 INJECTION INTRAMUSCULAR; INTRAVENOUS ONCE
Status: COMPLETED | OUTPATIENT
Start: 2019-07-10 | End: 2019-07-10

## 2019-07-10 RX ORDER — FENTANYL CITRATE 50 UG/ML
25 INJECTION, SOLUTION INTRAMUSCULAR; INTRAVENOUS EVERY 5 MIN PRN
Status: DISCONTINUED | OUTPATIENT
Start: 2019-07-10 | End: 2019-07-11

## 2019-07-10 RX ORDER — SODIUM CHLORIDE 9 MG/ML
INJECTION, SOLUTION INTRAVENOUS CONTINUOUS PRN
Status: DISCONTINUED | OUTPATIENT
Start: 2019-07-10 | End: 2019-07-10

## 2019-07-10 RX ORDER — MINOCYCLINE HYDROCHLORIDE 100 MG/1
100 CAPSULE ORAL EVERY 12 HOURS
Status: DISCONTINUED | OUTPATIENT
Start: 2019-07-11 | End: 2019-07-12 | Stop reason: HOSPADM

## 2019-07-10 RX ORDER — DIPHENHYDRAMINE HYDROCHLORIDE 50 MG/ML
25 INJECTION INTRAMUSCULAR; INTRAVENOUS EVERY 6 HOURS PRN
Status: DISCONTINUED | OUTPATIENT
Start: 2019-07-10 | End: 2019-07-11

## 2019-07-10 RX ADMIN — TOPICAL ANESTHETIC 0.5 EACH: 200 SPRAY DENTAL; PERIODONTAL at 10:07

## 2019-07-10 RX ADMIN — METOPROLOL TARTRATE 25 MG: 25 TABLET, FILM COATED ORAL at 08:07

## 2019-07-10 RX ADMIN — PROPOFOL 30 MG: 10 INJECTION, EMULSION INTRAVENOUS at 10:07

## 2019-07-10 RX ADMIN — EPINEPHRINE 10 MCG: 1 INJECTION, SOLUTION INTRAMUSCULAR; SUBCUTANEOUS at 10:07

## 2019-07-10 RX ADMIN — Medication 2000 MG: at 09:07

## 2019-07-10 RX ADMIN — EPINEPHRINE 20 MCG: 1 INJECTION, SOLUTION INTRAMUSCULAR; SUBCUTANEOUS at 11:07

## 2019-07-10 RX ADMIN — HEPARIN SODIUM AND DEXTROSE 14 UNITS/KG/HR: 10000; 5 INJECTION INTRAVENOUS at 12:07

## 2019-07-10 RX ADMIN — PIPERACILLIN AND TAZOBACTAM 4.5 G: 4; .5 INJECTION, POWDER, LYOPHILIZED, FOR SOLUTION INTRAVENOUS; PARENTERAL at 09:07

## 2019-07-10 RX ADMIN — SODIUM CHLORIDE: 9 INJECTION, SOLUTION INTRAVENOUS at 10:07

## 2019-07-10 RX ADMIN — ASPIRIN 81 MG: 81 TABLET, COATED ORAL at 08:07

## 2019-07-10 RX ADMIN — MINOCYCLINE HYDROCHLORIDE 200 MG: 100 CAPSULE ORAL at 04:07

## 2019-07-10 RX ADMIN — IPRATROPIUM BROMIDE AND ALBUTEROL SULFATE 3 ML: .5; 3 SOLUTION RESPIRATORY (INHALATION) at 06:07

## 2019-07-10 RX ADMIN — HEPARIN SODIUM AND DEXTROSE 14 UNITS/KG/HR: 10000; 5 INJECTION INTRAVENOUS at 05:07

## 2019-07-10 RX ADMIN — PANTOPRAZOLE SODIUM 40 MG: 40 TABLET, DELAYED RELEASE ORAL at 08:07

## 2019-07-10 RX ADMIN — ROSUVASTATIN CALCIUM 20 MG: 20 TABLET, FILM COATED ORAL at 07:07

## 2019-07-10 RX ADMIN — EPINEPHRINE 10 MCG: 1 INJECTION, SOLUTION INTRAMUSCULAR; SUBCUTANEOUS at 11:07

## 2019-07-10 RX ADMIN — AMIODARONE HYDROCHLORIDE 400 MG: 200 TABLET ORAL at 08:07

## 2019-07-10 RX ADMIN — GUAIFENESIN 600 MG: 600 TABLET, EXTENDED RELEASE ORAL at 08:07

## 2019-07-10 RX ADMIN — IPRATROPIUM BROMIDE AND ALBUTEROL SULFATE 3 ML: .5; 3 SOLUTION RESPIRATORY (INHALATION) at 04:07

## 2019-07-10 RX ADMIN — IPRATROPIUM BROMIDE AND ALBUTEROL SULFATE 3 ML: .5; 3 SOLUTION RESPIRATORY (INHALATION) at 10:07

## 2019-07-10 RX ADMIN — METOPROLOL TARTRATE 25 MG: 25 TABLET, FILM COATED ORAL at 07:07

## 2019-07-10 RX ADMIN — PIPERACILLIN AND TAZOBACTAM 4.5 G: 4; .5 INJECTION, POWDER, LYOPHILIZED, FOR SOLUTION INTRAVENOUS; PARENTERAL at 01:07

## 2019-07-10 RX ADMIN — PROPOFOL 20 MG: 10 INJECTION, EMULSION INTRAVENOUS at 10:07

## 2019-07-10 RX ADMIN — PREDNISONE 5 MG: 5 TABLET ORAL at 08:07

## 2019-07-10 RX ADMIN — DEXMEDETOMIDINE HYDROCHLORIDE 28 MCG: 100 INJECTION, SOLUTION, CONCENTRATE INTRAVENOUS at 10:07

## 2019-07-10 RX ADMIN — FUROSEMIDE 80 MG: 10 INJECTION, SOLUTION INTRAMUSCULAR; INTRAVENOUS at 11:07

## 2019-07-10 RX ADMIN — EPHEDRINE SULFATE 25 MG: 50 INJECTION, SOLUTION INTRAMUSCULAR; INTRAVENOUS; SUBCUTANEOUS at 11:07

## 2019-07-10 RX ADMIN — DEXMEDETOMIDINE HYDROCHLORIDE 8 MCG: 100 INJECTION, SOLUTION, CONCENTRATE INTRAVENOUS at 10:07

## 2019-07-10 RX ADMIN — CLOPIDOGREL 75 MG: 75 TABLET, FILM COATED ORAL at 08:07

## 2019-07-10 NOTE — ASSESSMENT & PLAN NOTE
Pleural effusion noted on CXR; repeat imaging noted infiltrate and small pleural effusion at the left lung base, the overall pattern is that of progression. Suspect etiology 2/2 HCAP vs aspiration.     Plan:  - BCx NGTD, sputum positive for stenotrophomonas  - vanc/zosyn/doxycycline started  - aspiration precautions  - consult ID, appreciate assistance

## 2019-07-10 NOTE — PROGRESS NOTES
Called to pt bedside to place pt back on biPAP. Pt appeared to have labored breathing. BP and o2 sat stable. Pt sinus nkechi on monitor. Called respiratory to bedside to administer breathing treatment and place pt back on biPAP. Paged on call MD.     Chest xray ordered per MD montano.

## 2019-07-10 NOTE — ANESTHESIA POSTPROCEDURE EVALUATION
Anesthesia Post Evaluation    Patient: Wiley Alfred    Procedure(s) Performed: Procedure(s) (LRB):  ECHOCARDIOGRAM, TRANSESOPHAGEAL (N/A)    Final Anesthesia Type: general  Patient location during evaluation: telemetry step down  Patient participation: Yes- Able to Participate  Level of consciousness: awake and alert  Post-procedure vital signs: reviewed and stable  Pain management: adequate  Airway patency: patent  PONV status at discharge: No PONV  Anesthetic complications: no      Cardiovascular status: hemodynamically stable  Respiratory status: unassisted  Hydration status: euvolemic  Follow-up not needed.          Vitals Value Taken Time   /55 7/10/2019  1:49 PM   Temp 36.9 °C (98.5 °F) 7/10/2019 11:36 AM   Pulse 55 7/10/2019  2:00 PM   Resp 19 7/10/2019  1:49 PM   SpO2 96 % 7/10/2019  1:49 PM         No case tracking events are documented in the log.      Pain/Memo Score: Memo Score: 10 (7/10/2019 11:50 AM)

## 2019-07-10 NOTE — ASSESSMENT & PLAN NOTE
50+ year smoking history; on home O2. Received nebs and solu-medrol at OSH.     Plan:  - duo-nebs prn  - oxygen support with NC, goal Sat 88%  - BIPAP QHS and prn  - steroid taper  - vanc/zosyn/azithro started 7/10/19; plan for 7-day course END DATE 7/16/19

## 2019-07-10 NOTE — CONSULTS
[facility]  Infectious Disease  Consult Note    Patient Name: Wiley Alfred  MRN: [id number]  Admission Date: 7/5/2019  Hospital Length of Stay: 5 days  Attending Physician: Aaron Bacon MD  Primary Care Provider: Daniel Almeida MD     Isolation Status: No active isolations    Patient information was obtained from patient, relative(s) and past medical records.      Inpatient consult to Infectious Diseases  Consult performed by: LOUISE Goodwin  Consult ordered by: Cierra Mullen MD  Reason for consult: pneumonia        Assessment/Plan:     Pneumonia involving right lung  74 year old male with underlying COPD (on home O2), AS, history of CVA, A Fib transferred from [facility]; was originally seen for SOB; MANUEL revealed severe MR, moderate AS and TR with preserved LV function; transferred to see cardiology for possible TAVR; CXR revealed opacification in lung bases; sputum culture revealed stenotrophomonas; ID consulted for recs.     Pt is afebrile. WBC count- 17,000. Blood cultures NGTD.     Plan:  1. D/c vanc and zosyn  2. Start minocycline; loading dose of 200 mg followed by 100 mg PO BID  3. Will f/u on susceptibilities and tailor antibiotics accordingly; bactrim not an option given allergy but if tetracycline R, may be able to use levaquin  4. Monitor O2 requirements, respiratory status, fever, WBC count  5. Will follow        Thank you for your consult. I will follow-up with patient. Please contact us if you have any additional questions.    LOUISE Maldonado Pager: [phone]    Infectious Disease  [facility]    Subjective:     Principal Problem: Acute on chronic heart failure    HPI: This is a 74 year old male with PMH of AS, CAD, COPD on home O2, CVA, A. Fib (eliquis, amiodarone) who presented to [facility] w/ acute onset of SOB. Patient was found to be hypertensive  and in hypoxia and hypercapnia respiratory failure requiring nitroglycerin  gtt and BIPAP. Received lasix and nebs with solu-medrol with improvement. Patient received MANUEL which showed severe MR, moderate AS and TR with preserved LV function. He was evaluated by CTS, was deemed high risk as a result of the severe COPD; so was transferred to [facility] [place] for further evaluation of possible MVR/TVR/AVR vs TAVR/MV clip in a stable condition by [facility].      Prior to this admission patient was admitted on 6/23/19  Where he underwent LHC which revealed severe single-vessel CAD s/p PCI; patent stented RCA, moderate AS.     Patient went into Afib w/ RVR on hospital day 2 after apneic episode overnight; received BIPAP QHS and was converted back to NSR. His amiodarone and metoprolol tartrate were continued from OSH.  CTS evaluated patient, not recommending surgical option due to high STS risk. Evaluated by Interventional Cardiology, MANUEL for evaluation of aortic valve as part of TAVR work up on 7/10/19.      Patient developed worsening leukocytosis while CXR showed pleural effusion and possible consolidation in RLL. Concerning for aspiration vs HAP, so vanc/zosyn/azithro started 7/10/19.      Respiratory culture was obtained and grew stenotrophomonas. ID consulted for recs. Patient is allergic to bactrim; his reaction was SJS. He is afebrile but has a leukocytosis of 17,000. He continues to have a cough.        History reviewed. No pertinent past medical history.    History reviewed. No pertinent surgical history.    Review of patient's allergies indicates:   Allergen Reactions    Bactrim [sulfamethoxazole-trimethoprim] Blisters    Bacitracin/polymyxin Other (See Comments)     inflammation       Medications:  Medications Prior to Admission   Medication Sig    albuterol (ACCUNEB) 1.25 mg/3 mL Nebu Take 2.5 mg by nebulization every 6 (six) hours as needed. Rescue    albuterol (PROAIR HFA) 90 mcg/actuation inhaler Inhale 2 puffs into the lungs every 6 (six) hours as needed for Wheezing. Rescue    amiodarone  (PACERONE) 200 MG Tab Take 400 mg by mouth 2 (two) times daily.    amLODIPine (NORVASC) 10 MG tablet Take 10 mg by mouth once daily.    apixaban (ELIQUIS) 5 mg Tab Take 5 mg by mouth 2 (two) times daily.    fluticasone-umeclidin-vilanter (TRELEGY ELLIPTA) 100-62.5-25 mcg DsDv Inhale 1 puff into the lungs once daily.    furosemide (LASIX) 40 MG tablet Take 40 mg by mouth once daily.    guaiFENesin (MUCINEX) 600 mg 12 hr tablet Take 600 mg by mouth once daily.    ipratropium (ATROVENT) 0.02 % nebulizer solution Take 500 mcg by nebulization 4 (four) times daily. Rescue    lansoprazole (PREVACID) 30 MG capsule Take 30 mg by mouth once daily.    methylPREDNISolone (MEDROL DOSEPACK) 4 mg tablet Take 4 mg by mouth. use as directed    metoprolol tartrate (LOPRESSOR) 25 MG tablet Take 25 mg by mouth 2 (two) times daily.    nitroGLYCERIN (NITROSTAT) 0.4 MG SL tablet Place 0.4 mg under the tongue every 5 (five) minutes as needed for Chest pain.    potassium chloride (KLOR-CON) 10 MEQ TbSR Take 20 mEq by mouth once.    rosuvastatin (CRESTOR) 20 MG tablet Take 20 mg by mouth once daily.     Antibiotics (From admission, onward)    Start     Stop Route Frequency Ordered    07/11/19 0100  minocycline capsule 100 mg      -- Oral Every 12 hours 07/10/19 1219    07/10/19 1330  minocycline capsule 200 mg      -- Oral Once 07/10/19 1215        Antifungals (From admission, onward)    None        Antivirals (From admission, onward)    None             There is no immunization history on file for this patient.    Family History     None        Social History     Socioeconomic History    Marital status:      Spouse name: Not on file    Number of children: Not on file    Years of education: Not on file    Highest education level: Not on file   Occupational History    Not on file   Social Needs    Financial resource strain: Not on file    Food insecurity:     Worry: Not on file     Inability: Not on file     Transportation needs:     Medical: Not on file     Non-medical: Not on file   Tobacco Use    Smoking status: Former Smoker    Smokeless tobacco: Former User   Substance and Sexual Activity    Alcohol use: Not on file    Drug use: Not on file    Sexual activity: Not on file   Lifestyle    Physical activity:     Days per week: Not on file     Minutes per session: Not on file    Stress: Not on file   Relationships    Social connections:     Talks on phone: Not on file     Gets together: Not on file     Attends Muslim service: Not on file     Active member of club or organization: Not on file     Attends meetings of clubs or organizations: Not on file     Relationship status: Not on file   Other Topics Concern    Not on file   Social History Narrative    Not on file     Review of Systems   Constitutional: Positive for fatigue. Negative for chills and fever.   Respiratory: Positive for cough and shortness of breath.    Cardiovascular: Negative for chest pain and leg swelling.   Gastrointestinal: Negative for abdominal pain, constipation, diarrhea, nausea and vomiting.   Genitourinary: Negative for dysuria, frequency and hematuria.   Musculoskeletal: Negative for back pain and myalgias.   Skin: Negative for rash and wound.   Neurological: Negative for dizziness, weakness, light-headedness, numbness and headaches.   Psychiatric/Behavioral: Negative for agitation and behavioral problems. The patient is not nervous/anxious.      Objective:     Vital Signs (Most Recent):  Temp: 98.5 °F (36.9 °C) (07/10/19 1136)  Pulse: (!) 55 (07/10/19 1400)  Resp: 19 (07/10/19 1349)  BP: (!) 124/55 (07/10/19 1349)  SpO2: 96 % (07/10/19 1349) Vital Signs (24h Range):  Temp:  [96.2 °F (35.7 °C)-98.7 °F (37.1 °C)] 98.5 °F (36.9 °C)  Pulse:  [50-63] 55  Resp:  [17-25] 19  SpO2:  [91 %-100 %] 96 %  BP: ()/(51-82) 124/55     Weight: 82.3 kg (181 lb 7 oz)  Body mass index is 28.42 kg/m².    Estimated Creatinine Clearance: 38.6  mL/min (A) (based on SCr of 1.7 mg/dL (H)).    Physical Exam   Constitutional: He is oriented to person, place, and time. He appears well-developed and well-nourished. No distress.   Cardiovascular: Normal rate and regular rhythm.   Murmur heard.  Pulmonary/Chest: Effort normal. No respiratory distress. He has wheezes.   Nasal cannula   Abdominal: Soft. Bowel sounds are normal. He exhibits no distension.   Musculoskeletal: Normal range of motion. He exhibits edema.   Neurological: He is alert and oriented to person, place, and time.   Skin: Skin is warm and dry.   Psychiatric: He has a normal mood and affect. His behavior is normal.       Significant Labs:   Blood Culture:   Recent Labs   Lab 07/08/19  1412   LABBLOO No Growth to date  No Growth to date  No Growth to date  No Growth to date     CBC:   Recent Labs   Lab 07/09/19  0457 07/10/19  0354   WBC 14.50* 17.13*   HGB 7.9* 7.8*   HCT 29.5* 28.1*   * 137*     CMP:   Recent Labs   Lab 07/09/19  0457 07/10/19  0355    140   K 3.9 4.3    104   CO2 28 23   GLU 89 96   BUN 58* 52*   CREATININE 2.0* 1.7*   CALCIUM 8.7 9.1   PROT 5.8* 5.7*   ALBUMIN 3.0* 2.9*   BILITOT 0.8 0.5   ALKPHOS 53* 54*   AST 15 17   ALT 25 24   ANIONGAP 9 13   EGFRNONAA 31.7* 38.6*     Wound Culture: No results for input(s): LABAERO in the last 4320 hours.    Significant Imaging: I have reviewed all pertinent imaging results/findings within the past 24 hours.

## 2019-07-10 NOTE — ASSESSMENT & PLAN NOTE
TTE at OSH showed severe MR, moderate AS and TR with preserved LV function. Patient is transferred to [facility] for further evaluation of possible MVR/TVR/AVR vs TAVR/MV clip in a stable condition by [facility].     Plan:  - No surgical option as patient is high STS score  - Evaluated by Interventional Cardiology; will perform MANUEL 7/10/19 as part of TAVR workup

## 2019-07-10 NOTE — ASSESSMENT & PLAN NOTE
74 year old male with underlying COPD (on home O2), AS, history of CVA, A Fib transferred from [facility] [place]; was originally seen for SOB; MANUEL revealed severe MR, moderate AS and TR with preserved LV function; transferred to see cardiology for possible TAVR; CXR revealed opacification in lung bases; sputum culture revealed stenotrophomonas; ID consulted for recs.     Pt is afebrile. WBC count- 17,000. Blood cultures NGTD.     Plan:  1. D/c vanc and zosyn  2. Start minocycline; loading dose of 200 mg followed by 100 mg PO BID  3. Will f/u on susceptibilities and tailor antibiotics accordingly; bactrim not an option given allergy but if tetracycline R, may be able to use levaquin  4. Monitor O2 requirements, respiratory status, fever, WBC count  5. Will follow

## 2019-07-10 NOTE — HPI
This is a 74 year old male with PMH of AS, CAD, COPD on home O2, CVA, A. Fib (eliquis, amiodarone) who presented to [facility] [place] w/ acute onset of SOB. Patient was found to be hypertensive  and in hypoxia and hypercapnia respiratory failure requiring nitroglycerin gtt and BIPAP. Received lasix and nebs with solu-medrol with improvement. Patient received MANUEL which showed severe MR, moderate AS and TR with preserved LV function. He was evaluated by CTS, was deemed high risk as a result of the severe COPD; so was transferred to [facility] [place] for further evaluation of possible MVR/TVR/AVR vs TAVR/MV clip in a stable condition by [facility].      Prior to this admission patient was admitted on 6/23/19  Where he underwent LHC which revealed severe single-vessel CAD s/p PCI; patent stented RCA, moderate AS.     Patient went into Afib w/ RVR on hospital day 2 after apneic episode overnight; received BIPAP QHS and was converted back to NSR. His amiodarone and metoprolol tartrate were continued from OSH.  CTS evaluated patient, not recommending surgical option due to high STS risk. Evaluated by Interventional Cardiology, MANUEL for evaluation of aortic valve as part of TAVR work up on 7/10/19.      Patient developed worsening leukocytosis while CXR showed pleural effusion and possible consolidation in RLL. Concerning for aspiration vs HAP, so vanc/zosyn/azithro started 7/10/19.      Respiratory culture was obtained and grew stenotrophomonas. ID consulted for recs. Patient is allergic to bactrim; his reaction was SJS. He is afebrile but has a leukocytosis of 17,000. He continues to have a cough.

## 2019-07-10 NOTE — PT/OT/SLP PROGRESS
Occupational Therapy      Patient Name:  Wiley Alfred   MRN:  [id number]    Patient not seen today secondary to Unavailable (Comment)(MANUEL today). Will follow-up tomorrow.    EZRA Welch  7/10/2019

## 2019-07-10 NOTE — SUBJECTIVE & OBJECTIVE
Interval History: Episode of labored breathing overnight, resolved after placing BIPAP and breathing treatment. Afebrile. Continues to have productive cough. Denies CP.     Review of Systems   Constitution: Negative for chills and fever.   HENT: Negative for congestion and sore throat.    Eyes: Negative for photophobia and visual disturbance.   Cardiovascular: Positive for dyspnea on exertion. Negative for chest pain, irregular heartbeat, palpitations and syncope.   Respiratory: Positive for cough, shortness of breath and sputum production.    Skin: Negative for itching and rash.   Musculoskeletal: Negative for falls.   Gastrointestinal: Negative for constipation, diarrhea, nausea and vomiting.   Genitourinary: Negative for dysuria and flank pain.   Neurological: Negative for dizziness and headaches.   Psychiatric/Behavioral: Negative for altered mental status and memory loss.     Objective:     Vital Signs (Most Recent):  Temp: 98.2 °F (36.8 °C) (07/10/19 0837)  Pulse: 63 (07/10/19 1100)  Resp: (!) 22 (07/10/19 0837)  BP: (!) 116/55 (07/10/19 0837)  SpO2: 96 % (07/10/19 0955) Vital Signs (24h Range):  Temp:  [96.2 °F (35.7 °C)-98.7 °F (37.1 °C)] 98.2 °F (36.8 °C)  Pulse:  [52-63] 63  Resp:  [17-25] 22  SpO2:  [91 %-98 %] 96 %  BP: (106-117)/(55-57) 116/55     Weight: 82.3 kg (181 lb 7 oz)  Body mass index is 28.42 kg/m².     SpO2: 96 %  O2 Device (Oxygen Therapy): nasal cannula w/ humidification      Intake/Output Summary (Last 24 hours) at 7/10/2019 1139  Last data filed at 7/10/2019 1103  Gross per 24 hour   Intake 780 ml   Output 1050 ml   Net -270 ml       Lines/Drains/Airways     Peripheral Intravenous Line                 Peripheral IV - Single Lumen 07/10/19 1029 22 G Left Forearm less than 1 day         Peripheral IV - Single Lumen 07/10/19 1030 22 G Posterior;Right Hand less than 1 day                Physical Exam   Constitutional: He is oriented to person, place, and time. No distress.   HENT:   Head:  Normocephalic and atraumatic.   Mouth/Throat: Oropharynx is clear and moist.   Eyes: Right eye exhibits no discharge. Left eye exhibits no discharge. No scleral icterus.   Neck: No JVD present.   Cardiovascular: Normal rate and regular rhythm.   Murmur heard.  Pulmonary/Chest: Effort normal. No stridor. No respiratory distress. He has wheezes.   On NC   Abdominal: Soft. Bowel sounds are normal. He exhibits no distension.   Musculoskeletal: He exhibits no edema or tenderness.   Neurological: He is alert and oriented to person, place, and time.   Skin: Skin is warm and dry. He is not diaphoretic.   Vitals reviewed.    Significant Labs:   CMP   Recent Labs   Lab 07/09/19  0457 07/10/19  0355    140   K 3.9 4.3    104   CO2 28 23   GLU 89 96   BUN 58* 52*   CREATININE 2.0* 1.7*   CALCIUM 8.7 9.1   PROT 5.8* 5.7*   ALBUMIN 3.0* 2.9*   BILITOT 0.8 0.5   ALKPHOS 53* 54*   AST 15 17   ALT 25 24   ANIONGAP 9 13   ESTGFRAFRICA 36.7* 44.6*   EGFRNONAA 31.7* 38.6*   , CBC   Recent Labs   Lab 07/09/19  0457 07/10/19  0354   WBC 14.50* 17.13*   HGB 7.9* 7.8*   HCT 29.5* 28.1*   * 137*    and All pertinent lab results from the last 24 hours have been reviewed.    Significant Imaging: X-Ray: CXR: X-Ray Chest 1 View (CXR):   Results for orders placed or performed during the hospital encounter of 07/05/19   X-Ray Chest 1 View    Narrative    EXAMINATION:  XR CHEST 1 VIEW    CLINICAL HISTORY:  labored breathing, CHF exacerbation;    TECHNIQUE:  Single view of the chest was obtained.    COMPARISON:  Multiple priors, most recent 07/09/2019    FINDINGS:  Unchanged cardiomediastinal contour. Low lung volumes with patchy opacities at the lung bases.  Probable small bilateral pleural effusions.  No pneumothorax.      Impression    Bibasilar atelectasis versus aspiration.  Probable small bilateral pleural effusions.      Electronically signed by: Monalisa Graves  Date:    07/10/2019  Time:    06:18

## 2019-07-10 NOTE — SUBJECTIVE & OBJECTIVE
History reviewed. No pertinent past medical history.    History reviewed. No pertinent surgical history.    Review of patient's allergies indicates:   Allergen Reactions    Bactrim [sulfamethoxazole-trimethoprim] Blisters    Bacitracin/polymyxin Other (See Comments)     inflammation       Medications:  Medications Prior to Admission   Medication Sig    albuterol (ACCUNEB) 1.25 mg/3 mL Nebu Take 2.5 mg by nebulization every 6 (six) hours as needed. Rescue    albuterol (PROAIR HFA) 90 mcg/actuation inhaler Inhale 2 puffs into the lungs every 6 (six) hours as needed for Wheezing. Rescue    amiodarone (PACERONE) 200 MG Tab Take 400 mg by mouth 2 (two) times daily.    amLODIPine (NORVASC) 10 MG tablet Take 10 mg by mouth once daily.    apixaban (ELIQUIS) 5 mg Tab Take 5 mg by mouth 2 (two) times daily.    fluticasone-umeclidin-vilanter (TRELEGY ELLIPTA) 100-62.5-25 mcg DsDv Inhale 1 puff into the lungs once daily.    furosemide (LASIX) 40 MG tablet Take 40 mg by mouth once daily.    guaiFENesin (MUCINEX) 600 mg 12 hr tablet Take 600 mg by mouth once daily.    ipratropium (ATROVENT) 0.02 % nebulizer solution Take 500 mcg by nebulization 4 (four) times daily. Rescue    lansoprazole (PREVACID) 30 MG capsule Take 30 mg by mouth once daily.    methylPREDNISolone (MEDROL DOSEPACK) 4 mg tablet Take 4 mg by mouth. use as directed    metoprolol tartrate (LOPRESSOR) 25 MG tablet Take 25 mg by mouth 2 (two) times daily.    nitroGLYCERIN (NITROSTAT) 0.4 MG SL tablet Place 0.4 mg under the tongue every 5 (five) minutes as needed for Chest pain.    potassium chloride (KLOR-CON) 10 MEQ TbSR Take 20 mEq by mouth once.    rosuvastatin (CRESTOR) 20 MG tablet Take 20 mg by mouth once daily.     Antibiotics (From admission, onward)    Start     Stop Route Frequency Ordered    07/11/19 0100  minocycline capsule 100 mg      -- Oral Every 12 hours 07/10/19 1219    07/10/19 1330  minocycline capsule 200 mg      -- Oral Once  07/10/19 1215        Antifungals (From admission, onward)    None        Antivirals (From admission, onward)    None             There is no immunization history on file for this patient.    Family History     None        Social History     Socioeconomic History    Marital status:      Spouse name: Not on file    Number of children: Not on file    Years of education: Not on file    Highest education level: Not on file   Occupational History    Not on file   Social Needs    Financial resource strain: Not on file    Food insecurity:     Worry: Not on file     Inability: Not on file    Transportation needs:     Medical: Not on file     Non-medical: Not on file   Tobacco Use    Smoking status: Former Smoker    Smokeless tobacco: Former User   Substance and Sexual Activity    Alcohol use: Not on file    Drug use: Not on file    Sexual activity: Not on file   Lifestyle    Physical activity:     Days per week: Not on file     Minutes per session: Not on file    Stress: Not on file   Relationships    Social connections:     Talks on phone: Not on file     Gets together: Not on file     Attends Mandaen service: Not on file     Active member of club or organization: Not on file     Attends meetings of clubs or organizations: Not on file     Relationship status: Not on file   Other Topics Concern    Not on file   Social History Narrative    Not on file     Review of Systems   Constitutional: Positive for fatigue. Negative for chills and fever.   Respiratory: Positive for cough and shortness of breath.    Cardiovascular: Negative for chest pain and leg swelling.   Gastrointestinal: Negative for abdominal pain, constipation, diarrhea, nausea and vomiting.   Genitourinary: Negative for dysuria, frequency and hematuria.   Musculoskeletal: Negative for back pain and myalgias.   Skin: Negative for rash and wound.   Neurological: Negative for dizziness, weakness, light-headedness, numbness and headaches.    Psychiatric/Behavioral: Negative for agitation and behavioral problems. The patient is not nervous/anxious.      Objective:     Vital Signs (Most Recent):  Temp: 98.5 °F (36.9 °C) (07/10/19 1136)  Pulse: (!) 55 (07/10/19 1400)  Resp: 19 (07/10/19 1349)  BP: (!) 124/55 (07/10/19 1349)  SpO2: 96 % (07/10/19 1349) Vital Signs (24h Range):  Temp:  [96.2 °F (35.7 °C)-98.7 °F (37.1 °C)] 98.5 °F (36.9 °C)  Pulse:  [50-63] 55  Resp:  [17-25] 19  SpO2:  [91 %-100 %] 96 %  BP: ()/(51-82) 124/55     Weight: 82.3 kg (181 lb 7 oz)  Body mass index is 28.42 kg/m².    Estimated Creatinine Clearance: 38.6 mL/min (A) (based on SCr of 1.7 mg/dL (H)).    Physical Exam   Constitutional: He is oriented to person, place, and time. He appears well-developed and well-nourished. No distress.   Cardiovascular: Normal rate and regular rhythm.   Murmur heard.  Pulmonary/Chest: Effort normal. No respiratory distress. He has wheezes.   Nasal cannula   Abdominal: Soft. Bowel sounds are normal. He exhibits no distension.   Musculoskeletal: Normal range of motion. He exhibits edema.   Neurological: He is alert and oriented to person, place, and time.   Skin: Skin is warm and dry.   Psychiatric: He has a normal mood and affect. His behavior is normal.       Significant Labs:   Blood Culture:   Recent Labs   Lab 07/08/19  1412   LABBLOO No Growth to date  No Growth to date  No Growth to date  No Growth to date     CBC:   Recent Labs   Lab 07/09/19  0457 07/10/19  0354   WBC 14.50* 17.13*   HGB 7.9* 7.8*   HCT 29.5* 28.1*   * 137*     CMP:   Recent Labs   Lab 07/09/19  0457 07/10/19  0355    140   K 3.9 4.3    104   CO2 28 23   GLU 89 96   BUN 58* 52*   CREATININE 2.0* 1.7*   CALCIUM 8.7 9.1   PROT 5.8* 5.7*   ALBUMIN 3.0* 2.9*   BILITOT 0.8 0.5   ALKPHOS 53* 54*   AST 15 17   ALT 25 24   ANIONGAP 9 13   EGFRNONAA 31.7* 38.6*     Wound Culture: No results for input(s): LABAERO in the last 4320 hours.    Significant  Imaging: I have reviewed all pertinent imaging results/findings within the past 24 hours.

## 2019-07-10 NOTE — TRANSFER OF CARE
"Anesthesia Transfer of Care Note    Patient: Wiley Alfred    Procedure(s) Performed: Procedure(s) (LRB):  ECHOCARDIOGRAM, TRANSESOPHAGEAL (N/A)    Patient location: PACU    Anesthesia Type: general    Transport from OR: Transported from OR on 2-3 L/min O2 by NC with adequate spontaneous ventilation    Post pain: adequate analgesia    Post assessment: no apparent anesthetic complications and tolerated procedure well    Post vital signs: stable    Level of consciousness: sedated and responds to stimulation    Nausea/Vomiting: no nausea/vomiting    Complications: none    Transfer of care protocol was followed      Last vitals:   Visit Vitals  BP (!) 116/55 (Patient Position: Lying)   Pulse 63   Temp 36.8 °C (98.2 °F) (Oral)   Resp (!) 22   Ht 5' 7" (1.702 m)   Wt 82.3 kg (181 lb 7 oz)   SpO2 96%   BMI 28.42 kg/m²     "

## 2019-07-10 NOTE — ASSESSMENT & PLAN NOTE
74 y.o. M w/ CAD, COPD on home O2, HTN, CVA, pAF (eliquis, amiodarone) presented w/ acute onset of SOB concerning for ADHF and COPD exacerbation. MANUEL found showed severe MR, moderate AS and TR with preserved LV function. Required BIPAP, nitroglycerin gtt and IV diuresis at OSH.     - Clinically stable  - F/u CXR, BNP, electrolytes  - Diuresed with IV furosemide 40mg QD, now euvolemic   - continue home lopressor 25mg BID, atorvastatin 40mg  - BIPAP QHS and when napping  - strict I/O  - daily weights  - stepped down 7/6/19

## 2019-07-10 NOTE — PHYSICIAN QUERY
"PT Name: Wiley Alfred  MR #: [id number]    Physician Query Form - Heart  Condition Clarification     CDS/: Xiang Velarde Jr, RN               Contact information:[email or web address]  This form is a permanent document in the medical record.     Query Date: July 10, 2019    By submitting this query, we are merely seeking further clarification of documentation. Please utilize your independent clinical judgment when addressing the question(s) below.    The medical record contains the following   Indicators     Supporting Clinical Findings Location in Medical Record   x BNP 1067 7/6 Labs    EF      Radiology findings     x Echo Results Left Ventricle Normal ejection fraction at 68%. Mild left ventricular enlargement. Mild eccentric observed. Indeterminate left ventricular diastolic function. No wall motion abnormalities.   7/5 Transthoracic Echo     "Ascites" documented     x "SOB" or "SMITH" documented Respiratory: Positive for cough, shortness of breath  7/5 H&P    "Hypoxia" documented     x Heart Failure documented 74 y.o. male w/ a significant PMHx of severe AS, severe MR, severe COPD, HFpEF    Principal Problem:Acute on chronic heart failure   7/9 Anesthesia Preprocedure Note    7/10 Cardiology Progress Note   x "Edema" documented Musculoskeletal: He exhibits edema (bilateral pitting edema to ankles).    7/5 H&P   x Diuretics/Meds furosemide injection 40 mg Once  furosemide injection 40 mg daily   7/6 MAR  7/5-7/9    Treatment:      Other:      Heart failure (HF) can be acute, chronic or both. It is generally further specificed as systolic, diastolic, or combined. Lastly, it is important to identify an underlying etiology if known or suspected.     Common clues to acute exacerbation:  Rapidly progressive symptoms (w/in 2 weeks of presentation), using IV diuretics to treat, using supplemental O2, pulmonary edema on Xray, MI w/in 4 weeks, and/or BNP >500    Systolic Heart Failure: is defined as chart " documentation of a left ventricular ejection fraction (LVEF) less than 40%     Diastolic Heart Failure: is defined as a left ventricular ejection fraction (LVEF) greater than 40%   +      Evidence of diastolic dysfunction on echocardiography OR    Right heart catheterization wedge pressure above 12 mm Hg OR    Left heart catheterization left ventricular end diastolic pressure 18 mm Hg or above.    References: *American Heart Association    The clinical guidelines noted below are only system guidelines, and do not replace the providers clinical judgment.     Provider, please specify the diagnosis associated with above clinical findings  Specify the type of Acute on Chronic Heart Failure    [ xxx  ] Acute on Chronic Diastolic Heart Failure -    Pre-existing diastoic HF diagnosis.  EF > 40%  and acute HF symptoms documented                                   [   ] Other Type of Heart Failure (please specify type): _________________________    [  ] Clinically Undetermined                          Please document in your progress notes daily for the duration of treatment until resolved and include in your discharge summary.

## 2019-07-10 NOTE — PROGRESS NOTES
Pharmacokinetic Initial Assessment: IV Vancomycin    Assessment/Plan:    Initiate intravenous vancomycin with loading dose of 2000 mg once with subsequent doses when random concentrations are less than 25 mcg/ml  Desired empiric serum trough concentration is 10 to 20 mcg/mL.  Draw vancomycin random level on 7/11/19 at 0830.  Pharmacy will continue to follow and monitor vancomycin.      Please contact pharmacy at extension [phone] with any questions regarding this assessment.     Thank you for the consult,   MAL BANUELOS     Patient brief summary:  Wiley Alfred is a 74 y.o. male initiated on antimicrobial therapy with IV Vancomycin for treatment of suspected lower respiratory infection    Drug Allergies:   Review of patient's allergies indicates:   Allergen Reactions    Bactrim [sulfamethoxazole-trimethoprim] Blisters    Bacitracin/polymyxin Other (See Comments)     inflammation       Actual Body Weight:   82.3 kg    Renal Function:   Estimated Creatinine Clearance: 38.6 mL/min (A) (based on SCr of 1.7 mg/dL (H)).,     Dialysis Method (if applicable):    CBC (last 72 hours):  Recent Labs   Lab Result Units 07/08/19  0608 07/09/19  0457 07/10/19  0354   WBC K/uL 12.36 14.50* 17.13*   Hemoglobin g/dL 7.9* 7.9* 7.8*   Hematocrit % 29.2* 29.5* 28.1*   Platelets K/uL 156 135* 137*   Gran% % 81.4* 75.0* 77.3*   Lymph% % 10.3* 14.3* 12.5*   Mono% % 6.1 7.0 7.0   Eosinophil% % 1.3 2.5 1.9   Basophil% % 0.0 0.1 0.1   Differential Method  Automated Automated Automated       Metabolic Panel (last 72 hours):  Recent Labs   Lab Result Units 07/08/19  0608 07/09/19  0457 07/09/19  1638 07/10/19  0355   Sodium mmol/L 144 140  --  140   Potassium mmol/L 4.4 3.9  --  4.3   Chloride mmol/L 104 103  --  104   CO2 mmol/L 31* 28  --  23   Glucose mg/dL 104 89  --  96   Glucose, UA   --   --  Negative  --    BUN, Bld mg/dL 52* 58*  --  52*   Creatinine mg/dL 1.8* 2.0*  --  1.7*   Albumin g/dL 2.9* 3.0*  --  2.9*   Total Bilirubin  mg/dL 0.7 0.8  --  0.5   Alkaline Phosphatase U/L 55 53*  --  54*   AST U/L 15 15  --  17   ALT U/L 27 25  --  24   Magnesium mg/dL 2.6 2.4  --  2.4   Phosphorus mg/dL 3.4 3.5  --  4.4       Drug levels (last 3 results):  No results for input(s): VANCOMYCINRA, VANCOMYCINPE, VANCOMYCINTR in the last 72 hours.    Microbiologic Results:  Microbiology Results (last 7 days)     Procedure Component Value Units Date/Time    Culture, Respiratory with Gram Stain [[id number]]  (Abnormal) Collected:  07/08/19 1725    Order Status:  Completed Specimen:  Respiratory from Sputum, Expectorated Updated:  07/10/19 0758     Respiratory Culture GRAM NEGATIVE ROSIO  Moderate  Identification and susceptibility pending       Gram Stain (Respiratory) <10 epithelial cells per low power field.     Gram Stain (Respiratory) Many WBC's     Gram Stain (Respiratory) Many Gram negative rods     Gram Stain (Respiratory) Few Gram positive cocci      Gram Stain (Respiratory) Rare budding yeast    Blood culture [[id number]]     Order Status:  Sent Specimen:  Blood     Blood culture [[id number]]     Order Status:  Sent Specimen:  Blood     Blood culture [[id number]] Collected:  07/08/19 1412    Order Status:  Completed Specimen:  Blood Updated:  07/09/19 1612     Blood Culture, Routine No Growth to date      No Growth to date    Blood culture [[id number]] Collected:  07/08/19 1412    Order Status:  Completed Specimen:  Blood Updated:  07/09/19 1612     Blood Culture, Routine No Growth to date      No Growth to date

## 2019-07-10 NOTE — PLAN OF CARE
Problem: Adult Inpatient Plan of Care  Goal: Plan of Care Review  Outcome: Ongoing (interventions implemented as appropriate)  Pt remained free of falls, trauma, injury. VSS. AOx4. Skin CDI. Pt put on BiPAP could not tolerate for more then two hours. Administered PRN breathing treatment. Pt continues on heparin drip at 14 units. Pt NPO for MANUEL today. Also, order for PFT for TAVR work up. Reviewed plan of care with pt and wife. Answered all questions. Pt tolerating plan of care.

## 2019-07-10 NOTE — PLAN OF CARE
Pt is resting comfortably after MANUEL. He remains on 3L NC with sats at 98%. No complaints of pain or nausea.

## 2019-07-10 NOTE — NURSING TRANSFER
Nursing Transfer Note      7/10/2019     Transfer From: [facility] recovery    Transfer via stretcher    Transfer with 3L to O2    Transported by transport    Medicines sent: N/A    Chart send with patient: Yes    Notified: Aaliyah ESPINAL    Patient reassessed at: 07/10/19 1230    Upon arrival to floor: call hong in reach      Dr Moran was notified of patient's last vitals and MD was ok for patient to return to floor. MD said BP within 10% of baseline and HR between 49-50s was ok because it is close to patient's norm.

## 2019-07-10 NOTE — PROGRESS NOTES
[facility]  Cardiology  Progress Note    Patient Name: Wiley Alfred  MRN: [id number]  Admission Date: 7/5/2019  Hospital Length of Stay: 5 days  Code Status: Full Code   Attending Physician: Aaron Bacon MD   Primary Care Physician: Daniel Almeida MD  Expected Discharge Date: 7/11/2019  Principal Problem:Acute on chronic heart failure    Subjective:   Chief Complaint: shortness of breath    HPI:  Patient is 74 y.o. M w/ aortic stenosis, CAD, COPD on home O2, CVA, pAF (eliquis, amiodarone) presented to [facility] w/ acute onset of SOB. Patient was found to be hypertensive  and in hypoxia and hypercapnia respiratory failure requiring nitroglycerin gtt and BIPAP. Received lasix and nebs with solu-medrol with improvement. Patient received MANUEL which showed severe MR, moderate AS and TR with preserved LV function. He was evaluated by CTS, was deemed high risk as a result of the severe COPD; so was transferred to [facility] for further evaluation of possible MVR/TVR/AVR vs TAVR/MV clip in a stable condition by [facility].     Prior to this admission patient was admitted on 6/23/19  Where he underwent LHC which revealed severe single-vessel CAD s/p PCI; patent stented RCA, moderate AS.     Hospital Course:   Transferred from [facility] for surgical/interventional cardiology evaluation for severe AS, mitral and tricuspid regurgitation, as well as ongoing management of ADHF and COPD exacerbation. IV furosemide 40mg QD given with adequate diuresis. Patient went into Afib w/ RVR on hospital day 2 after apneic episode overnight; received BIPAP QHS and was converted back to NSR. His amiodarone and metoprolol tartrate were continued from OSH. On hospital day 6, amiodarone dose was lowered to 200mg QD.     CTS evaluated patient, not recommending surgical option due to high STS risk. Evaluated by Interventional Cardiology, MANUEL for evaluation of aortic valve as part of TAVR work up on 7/10/19.      Patient developed worsening leukocytosis while CXR showed pleural effusion and possible consolidation in RLL. Concerning for aspiration vs HAP, so vanc/zosyn/azithro started 7/10/19.     Interval History: Episode of labored breathing overnight, resolved after placing BIPAP and breathing treatment. Afebrile. Continues to have productive cough. Denies CP.     Review of Systems   Constitution: Negative for chills and fever.   HENT: Negative for congestion and sore throat.    Eyes: Negative for photophobia and visual disturbance.   Cardiovascular: Positive for dyspnea on exertion. Negative for chest pain, irregular heartbeat, palpitations and syncope.   Respiratory: Positive for cough, shortness of breath and sputum production.    Skin: Negative for itching and rash.   Musculoskeletal: Negative for falls.   Gastrointestinal: Negative for constipation, diarrhea, nausea and vomiting.   Genitourinary: Negative for dysuria and flank pain.   Neurological: Negative for dizziness and headaches.   Psychiatric/Behavioral: Negative for altered mental status and memory loss.     Objective:     Vital Signs (Most Recent):  Temp: 98.2 °F (36.8 °C) (07/10/19 0837)  Pulse: 63 (07/10/19 1100)  Resp: (!) 22 (07/10/19 0837)  BP: (!) 116/55 (07/10/19 0837)  SpO2: 96 % (07/10/19 0955) Vital Signs (24h Range):  Temp:  [96.2 °F (35.7 °C)-98.7 °F (37.1 °C)] 98.2 °F (36.8 °C)  Pulse:  [52-63] 63  Resp:  [17-25] 22  SpO2:  [91 %-98 %] 96 %  BP: (106-117)/(55-57) 116/55     Weight: 82.3 kg (181 lb 7 oz)  Body mass index is 28.42 kg/m².     SpO2: 96 %  O2 Device (Oxygen Therapy): nasal cannula w/ humidification      Intake/Output Summary (Last 24 hours) at 7/10/2019 1139  Last data filed at 7/10/2019 1103  Gross per 24 hour   Intake 780 ml   Output 1050 ml   Net -270 ml       Lines/Drains/Airways     Peripheral Intravenous Line                 Peripheral IV - Single Lumen 07/10/19 1029 22 G Left Forearm less than 1 day         Peripheral IV -  Single Lumen 07/10/19 1030 22 G Posterior;Right Hand less than 1 day                Physical Exam   Constitutional: He is oriented to person, place, and time. No distress.   HENT:   Head: Normocephalic and atraumatic.   Mouth/Throat: Oropharynx is clear and moist.   Eyes: Right eye exhibits no discharge. Left eye exhibits no discharge. No scleral icterus.   Neck: No JVD present.   Cardiovascular: Normal rate and regular rhythm.   Murmur heard.  Pulmonary/Chest: Effort normal. No stridor. No respiratory distress. He has wheezes.   On NC   Abdominal: Soft. Bowel sounds are normal. He exhibits no distension.   Musculoskeletal: He exhibits no edema or tenderness.   Neurological: He is alert and oriented to person, place, and time.   Skin: Skin is warm and dry. He is not diaphoretic.   Vitals reviewed.    Significant Labs:   CMP   Recent Labs   Lab 07/09/19  0457 07/10/19  0355    140   K 3.9 4.3    104   CO2 28 23   GLU 89 96   BUN 58* 52*   CREATININE 2.0* 1.7*   CALCIUM 8.7 9.1   PROT 5.8* 5.7*   ALBUMIN 3.0* 2.9*   BILITOT 0.8 0.5   ALKPHOS 53* 54*   AST 15 17   ALT 25 24   ANIONGAP 9 13   ESTGFRAFRICA 36.7* 44.6*   EGFRNONAA 31.7* 38.6*   , CBC   Recent Labs   Lab 07/09/19  0457 07/10/19  0354   WBC 14.50* 17.13*   HGB 7.9* 7.8*   HCT 29.5* 28.1*   * 137*    and All pertinent lab results from the last 24 hours have been reviewed.    Significant Imaging: X-Ray: CXR: X-Ray Chest 1 View (CXR):   Results for orders placed or performed during the hospital encounter of 07/05/19   X-Ray Chest 1 View    Narrative    EXAMINATION:  XR CHEST 1 VIEW    CLINICAL HISTORY:  labored breathing, CHF exacerbation;    TECHNIQUE:  Single view of the chest was obtained.    COMPARISON:  Multiple priors, most recent 07/09/2019    FINDINGS:  Unchanged cardiomediastinal contour. Low lung volumes with patchy opacities at the lung bases.  Probable small bilateral pleural effusions.  No pneumothorax.      Impression     Bibasilar atelectasis versus aspiration.  Probable small bilateral pleural effusions.      Electronically signed by: Monalisa Graves  Date:    07/10/2019  Time:    06:18     Assessment and Plan:     * Acute on chronic heart failure  74 y.o. M w/ CAD, COPD on home O2, HTN, CVA, pAF (eliquis, amiodarone) presented w/ acute onset of SOB concerning for ADHF and COPD exacerbation. MANUEL found showed severe MR, moderate AS and TR with preserved LV function. Required BIPAP, nitroglycerin gtt and IV diuresis at OSH.     - Clinically stable  - F/u CXR, BNP, electrolytes  - Diuresed with IV furosemide 40mg QD, now euvolemic   - continue home lopressor 25mg BID, atorvastatin 40mg  - BIPAP QHS and when napping  - strict I/O  - daily weights  - stepped down 7/6/19    Pleural effusion  Pleural effusion noted on CXR; repeat imaging noted infiltrate and small pleural effusion at the left lung base, the overall pattern is that of progression. Suspect etiology 2/2 HCAP vs aspiration.     Plan:  - BCx NGTD, sputum positive for stenotrophomonas  - vanc/zosyn/doxycycline started  - aspiration precautions  - consult ID, appreciate assistance    CAD (coronary artery disease)  Prior to this admission patient was admitted on 6/23/19  Where he underwent LHC which revealed severe single-vessel CAD s/p PCI; patent stented RCA, moderate AS.     - continue DAPT    Hypertension   at OSH requiring nitroglycerin gtt. [place] [facility]     - holding home Norvasc for now    COPD exacerbation  50+ year smoking history; on home O2. Received nebs and solu-medrol at OSH.     Plan:  - duo-nebs prn  - oxygen support with NC, goal Sat 88%  - BIPAP QHS and prn  - steroid taper  - vanc/zosyn/azithro started 7/10/19; plan for 7-day course END DATE 7/16/19    Paroxysmal atrial fibrillation  CHADS-VASc 7 - 15.7% risk of stroke/TIA/systemic embolism  Continue amiodarone 400mg BID, metoprolol tartrate 25mg BID  Went into Afib on 7/6/19, HR <115.  On heparin gtt.   Transition back to [facility] when appropriate    Severe aortic stenosis  TTE at OSH showed severe MR, moderate AS and TR with preserved LV function. Patient is transferred to [facility] for further evaluation of possible MVR/TVR/AVR vs TAVR/MV clip in a stable condition by [facility].     Plan:  - No surgical option as patient is high STS score  - Evaluated by Interventional Cardiology; will perform MANUEL 7/10/19 as part of TAVR workup        VTE Risk Mitigation (From admission, onward)        Ordered     heparin 25,000 units in dextrose 5% 250 mL (100 units/mL) infusion LOW INTENSITY nomogram - [facility]  Continuous      07/05/19 1152     heparin 25,000 units in dextrose 5% (100 units/ml) IV bolus from bag - ADDITIONAL PRN BOLUS - 30 units/kg  As needed (PRN)      07/05/19 1152     heparin 25,000 units in dextrose 5% (100 units/ml) IV bolus from bag - ADDITIONAL PRN BOLUS - 60 units/kg  As needed (PRN)      07/05/19 1152     IP VTE HIGH RISK PATIENT  Once      07/05/19 1148          Cierra Mullen MD  Cardiology  [facility]

## 2019-07-11 LAB
ALBUMIN SERPL BCP-MCNC: 3.1 G/DL (ref 3.5–5.2)
ALP SERPL-CCNC: 58 U/L (ref 55–135)
ALT SERPL W/O P-5'-P-CCNC: 25 U/L (ref 10–44)
ANION GAP SERPL CALC-SCNC: 12 MMOL/L (ref 8–16)
APTT BLDCRRT: 51.2 SEC (ref 21–32)
AST SERPL-CCNC: 18 U/L (ref 10–40)
BACTERIA SPEC AEROBE CULT: ABNORMAL
BACTERIA SPEC AEROBE CULT: ABNORMAL
BASOPHILS # BLD AUTO: 0.01 K/UL (ref 0–0.2)
BASOPHILS NFR BLD: 0.1 % (ref 0–1.9)
BILIRUB SERPL-MCNC: 0.7 MG/DL (ref 0.1–1)
BUN SERPL-MCNC: 43 MG/DL (ref 8–23)
CALCIUM SERPL-MCNC: 8.9 MG/DL (ref 8.7–10.5)
CHLORIDE SERPL-SCNC: 101 MMOL/L (ref 95–110)
CO2 SERPL-SCNC: 29 MMOL/L (ref 23–29)
CREAT SERPL-MCNC: 1.9 MG/DL (ref 0.5–1.4)
DIFFERENTIAL METHOD: ABNORMAL
EOSINOPHIL # BLD AUTO: 0.2 K/UL (ref 0–0.5)
EOSINOPHIL NFR BLD: 1.4 % (ref 0–8)
ERYTHROCYTE [DISTWIDTH] IN BLOOD BY AUTOMATED COUNT: 21 % (ref 11.5–14.5)
EST. GFR  (AFRICAN AMERICAN): 39 ML/MIN/1.73 M^2
EST. GFR  (NON AFRICAN AMERICAN): 33.7 ML/MIN/1.73 M^2
GLUCOSE SERPL-MCNC: 119 MG/DL (ref 70–110)
GRAM STN SPEC: ABNORMAL
HCT VFR BLD AUTO: 27.9 % (ref 40–54)
HGB BLD-MCNC: 7.7 G/DL (ref 14–18)
IMM GRANULOCYTES # BLD AUTO: 0.13 K/UL (ref 0–0.04)
IMM GRANULOCYTES NFR BLD AUTO: 0.7 % (ref 0–0.5)
LYMPHOCYTES # BLD AUTO: 1.3 K/UL (ref 1–4.8)
LYMPHOCYTES NFR BLD: 7.6 % (ref 18–48)
MAGNESIUM SERPL-MCNC: 2.2 MG/DL (ref 1.6–2.6)
MCH RBC QN AUTO: 19.7 PG (ref 27–31)
MCHC RBC AUTO-ENTMCNC: 27.6 G/DL (ref 32–36)
MCV RBC AUTO: 72 FL (ref 82–98)
MONOCYTES # BLD AUTO: 1.2 K/UL (ref 0.3–1)
MONOCYTES NFR BLD: 6.9 % (ref 4–15)
NEUTROPHILS # BLD AUTO: 14.6 K/UL (ref 1.8–7.7)
NEUTROPHILS NFR BLD: 83.3 % (ref 38–73)
NRBC BLD-RTO: 0 /100 WBC
PHOSPHATE SERPL-MCNC: 3.7 MG/DL (ref 2.7–4.5)
PLATELET # BLD AUTO: 131 K/UL (ref 150–350)
PMV BLD AUTO: ABNORMAL FL (ref 9.2–12.9)
POST FEV1 FVC: 0.43
POST FEV1: 0.98
POST FVC: 2.29
POTASSIUM SERPL-SCNC: 4.2 MMOL/L (ref 3.5–5.1)
PRE FEV1 FVC: 48
PRE FEV1: 0.94
PRE FVC: 1.97
PREDICTED FEV1 FVC: 79
PREDICTED FEV1: 2.29
PREDICTED FVC: 2.9
PROT SERPL-MCNC: 6 G/DL (ref 6–8.4)
RBC # BLD AUTO: 3.9 M/UL (ref 4.6–6.2)
SODIUM SERPL-SCNC: 142 MMOL/L (ref 136–145)
TSH SERPL DL<=0.005 MIU/L-ACNC: 1.52 UIU/ML (ref 0.4–4)
VANCOMYCIN SERPL-MCNC: 11.8 UG/ML
WBC # BLD AUTO: 17.51 K/UL (ref 3.9–12.7)

## 2019-07-11 PROCEDURE — 25000242 PHARM REV CODE 250 ALT 637 W/ HCPCS: Performed by: STUDENT IN AN ORGANIZED HEALTH CARE EDUCATION/TRAINING PROGRAM

## 2019-07-11 PROCEDURE — 93005 ELECTROCARDIOGRAM TRACING: CPT

## 2019-07-11 PROCEDURE — 97110 THERAPEUTIC EXERCISES: CPT

## 2019-07-11 PROCEDURE — 25000003 PHARM REV CODE 250: Performed by: STUDENT IN AN ORGANIZED HEALTH CARE EDUCATION/TRAINING PROGRAM

## 2019-07-11 PROCEDURE — 94761 N-INVAS EAR/PLS OXIMETRY MLT: CPT

## 2019-07-11 PROCEDURE — 92610 EVALUATE SWALLOWING FUNCTION: CPT

## 2019-07-11 PROCEDURE — 27000221 HC OXYGEN, UP TO 24 HOURS

## 2019-07-11 PROCEDURE — 94660 CPAP INITIATION&MGMT: CPT

## 2019-07-11 PROCEDURE — 99233 PR SUBSEQUENT HOSPITAL CARE,LEVL III: ICD-10-PCS | Mod: ,,, | Performed by: INTERNAL MEDICINE

## 2019-07-11 PROCEDURE — 85730 THROMBOPLASTIN TIME PARTIAL: CPT

## 2019-07-11 PROCEDURE — 63600175 PHARM REV CODE 636 W HCPCS: Performed by: STUDENT IN AN ORGANIZED HEALTH CARE EDUCATION/TRAINING PROGRAM

## 2019-07-11 PROCEDURE — 84100 ASSAY OF PHOSPHORUS: CPT

## 2019-07-11 PROCEDURE — 80202 ASSAY OF VANCOMYCIN: CPT

## 2019-07-11 PROCEDURE — 25000003 PHARM REV CODE 250: Performed by: INTERNAL MEDICINE

## 2019-07-11 PROCEDURE — 63600175 PHARM REV CODE 636 W HCPCS

## 2019-07-11 PROCEDURE — 93010 ELECTROCARDIOGRAM REPORT: CPT | Mod: ,,, | Performed by: INTERNAL MEDICINE

## 2019-07-11 PROCEDURE — 99233 PR SUBSEQUENT HOSPITAL CARE,LEVL III: ICD-10-PCS | Mod: ,,, | Performed by: PHYSICIAN ASSISTANT

## 2019-07-11 PROCEDURE — 97530 THERAPEUTIC ACTIVITIES: CPT

## 2019-07-11 PROCEDURE — 94729 DIFFUSING CAPACITY: CPT | Performed by: INTERNAL MEDICINE

## 2019-07-11 PROCEDURE — 99233 SBSQ HOSP IP/OBS HIGH 50: CPT | Mod: ,,, | Performed by: PHYSICIAN ASSISTANT

## 2019-07-11 PROCEDURE — 94640 AIRWAY INHALATION TREATMENT: CPT

## 2019-07-11 PROCEDURE — 99900035 HC TECH TIME PER 15 MIN (STAT)

## 2019-07-11 PROCEDURE — 85025 COMPLETE CBC W/AUTO DIFF WBC: CPT

## 2019-07-11 PROCEDURE — 99233 SBSQ HOSP IP/OBS HIGH 50: CPT | Mod: ,,, | Performed by: INTERNAL MEDICINE

## 2019-07-11 PROCEDURE — 36415 COLL VENOUS BLD VENIPUNCTURE: CPT

## 2019-07-11 PROCEDURE — 94727 GAS DIL/WSHOT DETER LNG VOL: CPT | Performed by: INTERNAL MEDICINE

## 2019-07-11 PROCEDURE — 94060 EVALUATION OF WHEEZING: CPT | Performed by: INTERNAL MEDICINE

## 2019-07-11 PROCEDURE — 80053 COMPREHEN METABOLIC PANEL: CPT

## 2019-07-11 PROCEDURE — 20600001 HC STEP DOWN PRIVATE ROOM

## 2019-07-11 PROCEDURE — 84443 ASSAY THYROID STIM HORMONE: CPT

## 2019-07-11 PROCEDURE — 83735 ASSAY OF MAGNESIUM: CPT

## 2019-07-11 PROCEDURE — 93010 EKG 12-LEAD: ICD-10-PCS | Mod: ,,, | Performed by: INTERNAL MEDICINE

## 2019-07-11 PROCEDURE — 25000003 PHARM REV CODE 250

## 2019-07-11 PROCEDURE — 97116 GAIT TRAINING THERAPY: CPT

## 2019-07-11 RX ORDER — AMIODARONE HYDROCHLORIDE 200 MG/1
200 TABLET ORAL DAILY
Qty: 30 TABLET | Refills: 0 | Status: SHIPPED | OUTPATIENT
Start: 2019-07-12 | End: 2019-08-11

## 2019-07-11 RX ORDER — CLOPIDOGREL BISULFATE 75 MG/1
75 TABLET ORAL DAILY
Qty: 30 TABLET | Refills: 11
Start: 2019-07-11 | End: 2019-07-11

## 2019-07-11 RX ORDER — MINOCYCLINE HYDROCHLORIDE 100 MG/1
100 CAPSULE ORAL EVERY 12 HOURS
Qty: 56 CAPSULE | Refills: 0
Start: 2019-07-11 | End: 2019-07-11

## 2019-07-11 RX ORDER — IPRATROPIUM BROMIDE AND ALBUTEROL SULFATE 2.5; .5 MG/3ML; MG/3ML
3 SOLUTION RESPIRATORY (INHALATION) EVERY 4 HOURS
Status: DISCONTINUED | OUTPATIENT
Start: 2019-07-11 | End: 2019-07-12 | Stop reason: HOSPADM

## 2019-07-11 RX ORDER — TORSEMIDE 20 MG/1
20 TABLET ORAL DAILY
Qty: 30 TABLET | Refills: 0
Start: 2019-07-11 | End: 2019-07-11 | Stop reason: SDUPTHER

## 2019-07-11 RX ORDER — TORSEMIDE 20 MG/1
20 TABLET ORAL DAILY
Qty: 30 TABLET | Refills: 0 | Status: SHIPPED | OUTPATIENT
Start: 2019-07-11 | End: 2019-08-10

## 2019-07-11 RX ORDER — MINOCYCLINE HYDROCHLORIDE 100 MG/1
100 CAPSULE ORAL EVERY 12 HOURS
Qty: 56 CAPSULE | Refills: 0 | Status: SHIPPED | OUTPATIENT
Start: 2019-07-11 | End: 2019-08-08

## 2019-07-11 RX ORDER — CLOPIDOGREL BISULFATE 75 MG/1
75 TABLET ORAL DAILY
Qty: 30 TABLET | Refills: 0 | Status: SHIPPED | OUTPATIENT
Start: 2019-07-11 | End: 2019-08-10

## 2019-07-11 RX ORDER — ASPIRIN 81 MG/1
81 TABLET ORAL DAILY
Refills: 0 | COMMUNITY
Start: 2019-07-11 | End: 2019-07-18

## 2019-07-11 RX ADMIN — POLYETHYLENE GLYCOL 3350 17 G: 17 POWDER, FOR SOLUTION ORAL at 10:07

## 2019-07-11 RX ADMIN — IPRATROPIUM BROMIDE AND ALBUTEROL SULFATE 3 ML: .5; 3 SOLUTION RESPIRATORY (INHALATION) at 03:07

## 2019-07-11 RX ADMIN — ASPIRIN 81 MG: 81 TABLET, COATED ORAL at 10:07

## 2019-07-11 RX ADMIN — AMIODARONE HYDROCHLORIDE 200 MG: 200 TABLET ORAL at 10:07

## 2019-07-11 RX ADMIN — MINOCYCLINE HYDROCHLORIDE 100 MG: 100 CAPSULE ORAL at 01:07

## 2019-07-11 RX ADMIN — GUAIFENESIN 600 MG: 600 TABLET, EXTENDED RELEASE ORAL at 10:07

## 2019-07-11 RX ADMIN — METOPROLOL TARTRATE 25 MG: 25 TABLET, FILM COATED ORAL at 09:07

## 2019-07-11 RX ADMIN — ROSUVASTATIN CALCIUM 20 MG: 20 TABLET, FILM COATED ORAL at 09:07

## 2019-07-11 RX ADMIN — PANTOPRAZOLE SODIUM 40 MG: 40 TABLET, DELAYED RELEASE ORAL at 10:07

## 2019-07-11 RX ADMIN — METOPROLOL TARTRATE 25 MG: 25 TABLET, FILM COATED ORAL at 10:07

## 2019-07-11 RX ADMIN — PREDNISONE 5 MG: 5 TABLET ORAL at 10:07

## 2019-07-11 RX ADMIN — HEPARIN SODIUM AND DEXTROSE 14 UNITS/KG/HR: 10000; 5 INJECTION INTRAVENOUS at 07:07

## 2019-07-11 RX ADMIN — IPRATROPIUM BROMIDE AND ALBUTEROL SULFATE 3 ML: .5; 3 SOLUTION RESPIRATORY (INHALATION) at 07:07

## 2019-07-11 RX ADMIN — CLOPIDOGREL 75 MG: 75 TABLET, FILM COATED ORAL at 10:07

## 2019-07-11 RX ADMIN — MINOCYCLINE HYDROCHLORIDE 100 MG: 100 CAPSULE ORAL at 09:07

## 2019-07-11 NOTE — PLAN OF CARE
received consult to arrange home health and bipap machine.  Family requested [facility].  Referral has been made to [facility] via [facility].  Wife stated pt has home oxygen; however family did not bring portable oxygen tank.  Family will return home tonight to obtain oxygen.   will arrange bipap with [facility].  sw faxed bipap documentation to [employer].  They will deliver bipap to hospital tomorrow.  Pt will discharge home tomorrow once he receives oxygen and bipap.      07/11/19 1701   Post-Acute Status   Post-Acute Authorization Home Health/Hospice;Other

## 2019-07-11 NOTE — PLAN OF CARE
Problem: Adult Inpatient Plan of Care  Goal: Plan of Care Review  Outcome: Ongoing (interventions implemented as appropriate)  Pt free of falls/traumas/injuries. Skin remains clean, dry, and intact. Pt on and off of bipap and O2. Pt had 2 episodes of SOB, MD ordered 80 mg IVP, and one stat duo nebs. Pt states he feels better respiratory wise. Pt is diuresing. Pt re-educated on importance of measuring accurate intake and out put; pt verbalized and demonstrates understanding. Reviewed plan of care with pt; and pt verbalized understanding.  Pt AAOX4, VSS, and in no distress will continue to monitor.

## 2019-07-11 NOTE — PT/OT/SLP EVAL
Speech Language Pathology Evaluation/Discharge  Bedside Swallow    Patient Name:  Wiley Alfred   MRN:  [id number]  Admitting Diagnosis: Acute on chronic heart failure    Recommendations:                 General Recommendations:  Follow-up not indicated.  If team continues to be concerned with RLL opacities possibly being related to aspiration, may consider ordering Modified Barium Swallow Study to rule out silent aspiration.  However, no concerns for aspiration were present at the bedside.  Diet recommendations:  Regular, Thin   Aspiration Precautions: 1 bite/sip at a time, Alternating bites/sips, Avoid talking while eating, HOB to 90 degrees, Meds whole 1 at a time, Monitor for s/s of aspiration, Small bites/sips and Standard aspiration precautions   General Precautions: Standard, aspiration, fall, respiratory  Communication strategies:  none    History:     Past Medical History:   Diagnosis Date    Anemia     Aortic stenosis     Bronchial stenosis, right     CAD in native artery     Chronic midline low back pain     Chronic pain syndrome     COPD with chronic bronchitis and emphysema     Discoid atelectasis     GERD without esophagitis     History of CVA (cerebrovascular accident)     History of lumbar surgery     HLD (hyperlipidemia)     Lumbago     Lumbar spondylosis     Microcytic anemia     Occlusion and stenosis of bilateral carotid arteries     Presence of stent in coronary artery     Pulmonary scarring     Rhinophyma        Past Surgical History:   Procedure Laterality Date    CORONARY STENT PLACEMENT  2013    CORONARY STENT PLACEMENT  2001    h/o sinus surgery       Chief Complaint:  SOB      HPI:  Patient is 74 y.o. M w/ aortic stenosis, CAD, COPD on home O2, CVA, pAF (eliquis, amiodarone) presented to [facility] w/ acute onset of SOB. Patient was found to be hypertensive  and in hypoxia and hypercapnia respiratory failure requiring nitroglycerin gtt and BIPAP.  Received lasix and nebs with solu-medrol with improvement. Patient received MANUEL which showed severe MR, moderate AS and TR with preserved LV function. He was evaluated by CTS, was deemed high risk as a result of the severe COPD; so was transferred to [facility] for further evaluation of possible MVR/TVR/AVR vs TAVR/MV clip in a stable condition by [facility].      Prior to this admission patient was admitted on 6/23/19  Where he underwent LHC which revealed severe single-vessel CAD s/p PCI; patent stented RCA, moderate AS.      Prior Intubation HX:  None during this admission    Modified Barium Swallow: none on file    Chest X-Rays: 7/10/19:   Impression       Worsening bibasilar airspace opacities with right greater than left.  The findings may represent evolving pneumonia.    Cardiomegaly with trace bilateral pleural effusions.  Some underlying CHF may be present.     Prior diet: regular/thins      Subjective     Pt awake/alert and in NAD.  Pt able to move self to EOB to participate in bedside swallow assessment.  Wheezy breath sounds and use of abdominal muscles for during respiration observed after moving self and while sitting upright at EOB.    Pain/Comfort:  · Pain Rating 1: 0/10    Objective:     Oral Musculature Evaluation  · Oral Musculature: WFL  · Dentition: present and adequate  · Secretion Management: adequate  · Mucosal Quality: good  · Mandibular Strength and Mobility: WFL  · Oral Labial Strength and Mobility: WFL  · Lingual Strength and Mobility: WFL  · Velar Elevation: WFL  · Buccal Strength and Mobility: WFL  · Volitional Cough: strong; productive  · Volitional Swallow: elicited; timely; good elevation and excursion  · Voice Prior to PO Intake: dry, clear    Bedside Swallow Eval:   Consistencies Assessed:  · Thin liquids cup sips (approx 3oz cyclical cup sips); straw sips x 2  · Solids 1 cracker (2 bites)     Oral Phase:   · WNL    Pharyngeal Phase:   · no overt clinical signs/symptoms of aspiration  · no overt  clinical signs/symptoms of pharyngeal dysphagia    Compensatory Strategies  · None    Treatment: Education provided to pt regarding role of SLP, purpose of swallowing assessment, aspiration, overt s/s of aspiration, diet recommendations, and standard aspiration precautions.  SLP explained that pt's swallowing function appeared to be WFL/WNL, but encouraged pt to report s/s of aspiration.  Pt expressed understanding.     Assessment:     Wiley Alfred is a 74 y.o. male.   Oral and pharyngeal phases of the swallow appear to be WFL/WNL.  No further skilled SLP services warranted at this time.  However, if medical team continues to have concerns that RLL opacities may be related to aspiration, please order Modified Barium Swallow Study to rule out silent aspiration.     Goals:   Multidisciplinary Problems     SLP Goals     Not on file                Plan:     · Patient to be seen:      · Plan of Care expires:     · Plan of Care reviewed with:  patient   · SLP Follow-Up:  No       Discharge recommendations:  (no further skilled SLP services warranted at this time)     Time Tracking:     SLP Treatment Date:   07/11/19  Speech Start Time:  1401  Speech Stop Time:  1410     Speech Total Time (min):  9 min    Billable Minutes: Eval Swallow and Oral Function 9    MANDO Alejandro, CCC-SLP  07/11/2019       MANDO Alejandro, CCC-SLP  Speech Language Pathologist  [phone]  7/11/2019

## 2019-07-11 NOTE — PROGRESS NOTES
[facility]  Infectious Disease  Progress Note    Patient Name: Wiley Alfred  MRN: [id number]  Admission Date: 7/5/2019  Length of Stay: 6 days  Attending Physician: Aaron Bacon MD  Primary Care Provider: Daniel Almeida MD    Isolation Status: No active isolations  Assessment/Plan:      Pneumonia  74 year old male with underlying COPD (on home O2), AS, history of CVA, A Fib transferred from [facility]; was originally seen for SOB; MANUEL revealed severe MR, moderate AS and TR with preserved LV function; transferred to see cardiology for possible TAVR; CXR revealed opacification in lung bases; sputum culture revealed stenotrophomonas; ID consulted for recs.     Pt is afebrile. WBC count stable at 17K.  Blood cultures NGTD.     Plan:  1. Continue minocycline 100 mg PO BID for stenotrophomonas  2. Recommend 4 weeks of oral therapy given underlying COPD and stenotrophomonas  3. F/u with ID upon discharge; can f/u with Dr. Werner with ID on [facility] as closer to patient's home or can f/u in our ID clinic  4. Will sign off      Anticipated Disposition: oral antibiotics  Thank you for your consult. I will sign off. Please contact us if you have any additional questions.  LOUISE Maldonado Pager: [phone]  Infectious Disease  [facility]    Subjective:     Principal Problem:Acute on chronic heart failure    HPI: This is a 74 year old male with PMH of AS, CAD, COPD on home O2, CVA, A. Fib (eliquis, amiodarone) who presented to [facility] w/ acute onset of SOB. Patient was found to be hypertensive  and in hypoxia and hypercapnia respiratory failure requiring nitroglycerin gtt and BIPAP. Received lasix and nebs with solu-medrol with improvement. Patient received MANUEL which showed severe MR, moderate AS and TR with preserved LV function. He was evaluated by CTS, was deemed high risk as a result of the severe COPD; so [place] [facility] [place]uation of possible MVR/TVR/AVR vs TAVR/MV clip in a stable condition by [facility].      Prior to this admission patient was admitted on 6/23/19  Where he underwent LHC which revealed severe single-vessel CAD s/p PCI; patent stented RCA, moderate AS.     Patient went into Afib w/ RVR on hospital day 2 after apneic episode overnight; received BIPAP QHS and was converted back to NSR. His amiodarone and metoprolol tartrate were continued from OSH.  CTS evaluated patient, not recommending surgical option due to high STS risk. Evaluated by Interventional Cardiology, MANUEL for evaluation of aortic valve as part of TAVR work up on 7/10/19.      Patient developed worsening leukocytosis while CXR showed pleural effusion and possible consolidation in RLL. Concerning for aspiration vs HAP, so vanc/zosyn/azithro started 7/10/19.      Respiratory culture was obtained and grew stenotrophomonas. ID consulted for recs. Patient is allergic to bactrim; his reaction was SJS. He is afebrile but has a leukocytosis of 17,000. He continues to have a cough.      Interval History: pt with episode of SOB last night. Rapid response called given decrease in O2 sats. Pt's breathing is better today. WBC count stable at 17,000. Afebrile. Sputum culture with stenotrophomonas.     Review of Systems   Constitutional: Positive for fatigue. Negative for chills and fever.   Respiratory: Positive for cough and shortness of breath.    Cardiovascular: Negative for chest pain and leg swelling.   Gastrointestinal: Negative for abdominal pain, constipation, diarrhea, nausea and vomiting.   Genitourinary: Negative for dysuria, frequency and hematuria.   Musculoskeletal: Negative for back pain and myalgias.   Skin: Negative for rash and wound.   Neurological: Negative for dizziness, weakness, light-headedness, numbness and headaches.   Psychiatric/Behavioral: Negative for agitation and behavioral problems. The patient is not nervous/anxious.      Objective:      Vital Signs (Most Recent):  Temp: 98.1 °F (36.7 °C) (07/11/19 0811)  Pulse: 108 (07/11/19 1145)  Resp: 16 (07/11/19 0811)  BP: (!) 112/59 (07/11/19 0811)  SpO2: 95 % (07/11/19 1145) Vital Signs (24h Range):  Temp:  [97.8 °F (36.6 °C)-98.3 °F (36.8 °C)] 98.1 °F (36.7 °C)  Pulse:  [] 108  Resp:  [16-29] 16  SpO2:  [94 %-99 %] 95 %  BP: (105-131)/(55-66) 112/59     Weight: 83.6 kg (184 lb 6.4 oz)  Body mass index is 28.88 kg/m².    Estimated Creatinine Clearance: 34.7 mL/min (A) (based on SCr of 1.9 mg/dL (H)).    Physical Exam   Constitutional: He is oriented to person, place, and time. He appears well-developed and well-nourished. No distress.   Cardiovascular: Normal rate and regular rhythm.   Murmur heard.  Pulmonary/Chest: Effort normal. No respiratory distress. He has wheezes.   Nasal cannula   Abdominal: Soft. Bowel sounds are normal. He exhibits no distension.   Musculoskeletal: Normal range of motion. He exhibits edema.   Neurological: He is alert and oriented to person, place, and time.   Skin: Skin is warm and dry.   Psychiatric: He has a normal mood and affect. His behavior is normal.       Significant Labs:   Blood Culture:   Recent Labs   Lab 07/08/19  1412 07/10/19  0828 07/10/19  0834   LABBLOO No Growth to date  No Growth to date  No Growth to date  No Growth to date  No Growth to date  No Growth to date No Growth to date  No Growth to date No Growth to date  No Growth to date     CBC:   Recent Labs   Lab 07/10/19  0354 07/11/19  0353   WBC 17.13* 17.51*   HGB 7.8* 7.7*   HCT 28.1* 27.9*   * 131*     CMP:   Recent Labs   Lab 07/10/19  0355 07/11/19  0353    142   K 4.3 4.2    101   CO2 23 29   GLU 96 119*   BUN 52* 43*   CREATININE 1.7* 1.9*   CALCIUM 9.1 8.9   PROT 5.7* 6.0   ALBUMIN 2.9* 3.1*   BILITOT 0.5 0.7   ALKPHOS 54* 58   AST 17 18   ALT 24 25   ANIONGAP 13 12   EGFRNONAA 38.6* 33.7*     Respiratory Culture:   Recent Labs   Lab 07/08/19  1725   GSRESP <10  epithelial cells per low power field.  Many WBC's  Many Gram negative rods  Few Gram positive cocci   Rare budding yeast   RESPIRATORYC No S aureus or Pseudomonas isolated.  STENOTROPHOMONAS (X.) MALTOPHILIA  Moderate  Normal respiratory osvaldo also present  *       Significant Imaging: I have reviewed all pertinent imaging results/findings within the past 24 hours.

## 2019-07-11 NOTE — CARE UPDATE
"RAPID RESPONSE NURSE PROACTIVE ROUNDING NOTE     Time of Visit:     Admit Date: 2019  LOS: 5  Code Status: Full Code   Date of Visit: 07/10/2019  : 1945  Age: 74 y.o.  Sex: male  Race: Unknown  Bed: 321/321 A:   MRN: [id number]  Was the patient discharged from an ICU this admission? no   Was the patient discharged from a PACU within last 24 hours?  no  Did the patient receive conscious sedation/general anesthesia in last 24 hours?  no  Was the patient in the ED within the past 24 hours?  no  Was the patient started on NIPPV within the past 24 hours?  no  Attending Physician: Aaron Bacon MD  Primary Service: Networked reference to record PCT     ASSESSMENT     Diagnosis: Acute on chronic heart failure    Abnormal Vital Signs: BP (!) 117/56 (BP Location: Left arm, Patient Position: Lying)   Pulse 63   Temp 97.9 °F (36.6 °C) (Oral)   Resp (!) 27   Ht 5' 7" (1.702 m)   Wt 82.3 kg (181 lb 7 oz)   SpO2 99%   BMI 28.42 kg/m²      Clinical Issues: Respiratory    Patient  has no past medical history on file.    Patient seen in room per charge nurse request. Individual in hospital for evaluation of MVR/TVR/AVR, now with PNA (resp cx ) being followed by ID.  CXR 7/10 @  showing what "may represent evolving PNA" with "trace pleural effusions." MD ordered 80 mg IVP lasix.  Upon assessment, patient sitting in bed with RT @ bedside placing on bipap 8/5 and fio2 50 %. O2 sats 99%. HR 63. Describes intermittent episodes of nonproductive coughing and SOB. Lungs coarse with inspiratory wheezing and crackles @ bases. Patient does have prn neb treatments. Patient on  antibiotics and taking robitussin.       INTERVENTIONS/ RECOMMENDATIONS     Monitor vital signs and respiratory status. Monitor response to lasix dose. Continue Neb treatments. May consider scheduling breathing treatments. Encourage pulmonary hygiene.     Discussed plan of care with RNCarmela.    PHYSICIAN ESCALATION     Yes/No  " no    Orders received and case discussed with NA.    Disposition: Remain in room 321 A..    FOLLOW-UP     Call back the Rapid Response Nurse, Sinai Rivera RN at [phone] for additional questions or concerns.

## 2019-07-11 NOTE — SUBJECTIVE & OBJECTIVE
Interval History: pt with episode of SOB last night. Rapid response called given decrease in O2 sats. Pt's breathing is better today. WBC count stable at 17,000. Afebrile. Sputum culture with stenotrophomonas.     Review of Systems   Constitutional: Positive for fatigue. Negative for chills and fever.   Respiratory: Positive for cough and shortness of breath.    Cardiovascular: Negative for chest pain and leg swelling.   Gastrointestinal: Negative for abdominal pain, constipation, diarrhea, nausea and vomiting.   Genitourinary: Negative for dysuria, frequency and hematuria.   Musculoskeletal: Negative for back pain and myalgias.   Skin: Negative for rash and wound.   Neurological: Negative for dizziness, weakness, light-headedness, numbness and headaches.   Psychiatric/Behavioral: Negative for agitation and behavioral problems. The patient is not nervous/anxious.      Objective:     Vital Signs (Most Recent):  Temp: 98.1 °F (36.7 °C) (07/11/19 0811)  Pulse: 108 (07/11/19 1145)  Resp: 16 (07/11/19 0811)  BP: (!) 112/59 (07/11/19 0811)  SpO2: 95 % (07/11/19 1145) Vital Signs (24h Range):  Temp:  [97.8 °F (36.6 °C)-98.3 °F (36.8 °C)] 98.1 °F (36.7 °C)  Pulse:  [] 108  Resp:  [16-29] 16  SpO2:  [94 %-99 %] 95 %  BP: (105-131)/(55-66) 112/59     Weight: 83.6 kg (184 lb 6.4 oz)  Body mass index is 28.88 kg/m².    Estimated Creatinine Clearance: 34.7 mL/min (A) (based on SCr of 1.9 mg/dL (H)).    Physical Exam   Constitutional: He is oriented to person, place, and time. He appears well-developed and well-nourished. No distress.   Cardiovascular: Normal rate and regular rhythm.   Murmur heard.  Pulmonary/Chest: Effort normal. No respiratory distress. He has wheezes.   Nasal cannula   Abdominal: Soft. Bowel sounds are normal. He exhibits no distension.   Musculoskeletal: Normal range of motion. He exhibits edema.   Neurological: He is alert and oriented to person, place, and time.   Skin: Skin is warm and dry.    Psychiatric: He has a normal mood and affect. His behavior is normal.       Significant Labs:   Blood Culture:   Recent Labs   Lab 07/08/19  1412 07/10/19  0828 07/10/19  0834   LABBLOO No Growth to date  No Growth to date  No Growth to date  No Growth to date  No Growth to date  No Growth to date No Growth to date  No Growth to date No Growth to date  No Growth to date     CBC:   Recent Labs   Lab 07/10/19  0354 07/11/19  0353   WBC 17.13* 17.51*   HGB 7.8* 7.7*   HCT 28.1* 27.9*   * 131*     CMP:   Recent Labs   Lab 07/10/19  0355 07/11/19  0353    142   K 4.3 4.2    101   CO2 23 29   GLU 96 119*   BUN 52* 43*   CREATININE 1.7* 1.9*   CALCIUM 9.1 8.9   PROT 5.7* 6.0   ALBUMIN 2.9* 3.1*   BILITOT 0.5 0.7   ALKPHOS 54* 58   AST 17 18   ALT 24 25   ANIONGAP 13 12   EGFRNONAA 38.6* 33.7*     Respiratory Culture:   Recent Labs   Lab 07/08/19  1725   GSRESP <10 epithelial cells per low power field.  Many WBC's  Many Gram negative rods  Few Gram positive cocci   Rare budding yeast   RESPIRATORYC No S aureus or Pseudomonas isolated.  STENOTROPHOMONAS (X.) MALTOPHILIA  Moderate  Normal respiratory osvaldo also present  *       Significant Imaging: I have reviewed all pertinent imaging results/findings within the past 24 hours.

## 2019-07-11 NOTE — PLAN OF CARE
[facility]    HOME HEALTH ORDERS  FACE TO FACE ENCOUNTER    Patient Name: Wiley Alfred  YOB: 1945    PCP: Daniel Almeida MD   PCP Address: [address] [place] [address] [facility] /*  PCP Phone Number: [phone]  PCP Fax: [phone]    Encounter Date: 07/11/2019    Admit to Home Health    Diagnoses:  Active Hospital Problems    Diagnosis  POA    *Acute on chronic heart failure [I50.9]  Yes    Pneumonia involving right lung [J18.9]  No    Non-rheumatic mitral regurgitation [I34.0]  Yes    Pleural effusion [J90]  No    Severe aortic stenosis [I35.0]  Yes    Paroxysmal atrial fibrillation [I48.0]  Yes    COPD exacerbation [J44.1]  Yes    Hypertension [I10]  Yes    CAD (coronary artery disease) [I25.10]  Yes      Resolved Hospital Problems   No resolved problems to display.       No future appointments.        I have seen and examined this patient face to face today. My clinical findings that support the need for the home health skilled services and home bound status are the following:  Weakness/numbness causing balance and gait disturbance due to Heart Failure, Coronary Heart Disease, COPD Exacerbation and Weakness/Debility making it taxing to leave home.  Requiring assistive device to leave home due to unsteady gait caused by  Heart Failure, Coronary Artery Disease, COPD Exacerbation and Weakness/Debility.    Allergies:  Review of patient's allergies indicates:   Allergen Reactions    Bactrim [sulfamethoxazole-trimethoprim] Blisters    Bacitracin/polymyxin Other (See Comments)     inflammation       Diet: cardiac diet    Activities: activity as tolerated    Nursing:   SN to complete comprehensive assessment including routine vital signs. Instruct on disease process and s/s of complications to report to MD. Review/verify medication list sent home with the patient at time of discharge  and instruct patient/caregiver as needed. Frequency may be  adjusted depending on start of care date.    Notify MD if SBP > 160 or < 90; DBP > 90 or < 50; HR > 120 or < 50; Temp > 101; Other:   n/a      CONSULTS:    Physical Therapy to evaluate and treat. Evaluate for home safety and equipment needs; Establish/upgrade home exercise program. Perform / instruct on therapeutic exercises, gait training, transfer training, and Range of Motion.  Occupational Therapy to evaluate and treat. Evaluate home environment for safety and equipment needs. Perform/Instruct on transfers, ADL training, ROM, and therapeutic exercises.    MISCELLANEOUS CARE:  Heart Failure:      SN to instruct on the following:    Instruct on the definition of CHF.   Instruct on the signs/sympoms of CHF to be reported.   Instruct on and monitor daily weights.   Instruct on factors that cause exacerbation.   Instruct on action, dose, schedule, and side effects of medications.   Instruct on diet as prescribed.   Instruct on activity allowed.   Instruct on life-style modifications for life long management of CHF   SN to assess compliance with daily weights, diet, medications, fluid retention,    safety precautions, activities permitted and life-style modifications.   Additional 1-2 SN visits per week as needed for signs and symptoms     of CHF exacerbation.      For Weight Gain > 2-3 lbs in 1 day or 4-6 lbs over 1 week notify PCP:  Obtain BMP lab test in 3 days  Home Oxygen:  Oxygen at 4 L/min nasal canula to be used:  As needed for SOB. and Assess oxygen saturation via pulse oximeter as needed for increase in SOB. O2 Sat Goal <88%    WOUND CARE ORDERS  n/a      Medications: Review discharge medications with patient and family and provide education.      Current Discharge Medication List      START taking these medications    Details   aspirin (ECOTRIN) 81 MG EC tablet Take 1 tablet (81 mg total) by mouth once daily. for 7 days  Refills: 0      clopidogrel (PLAVIX) 75 mg tablet Take 1 tablet (75 mg total) by mouth  once daily.  Qty: 30 tablet, Refills: 11      torsemide (DEMADEX) 20 MG Tab Take 1 tablet (20 mg total) by mouth once daily.  Qty: 30 tablet, Refills: 0         CONTINUE these medications which have NOT CHANGED    Details   albuterol (ACCUNEB) 1.25 mg/3 mL Nebu Take 2.5 mg by nebulization every 6 (six) hours as needed. Rescue      albuterol (PROAIR HFA) 90 mcg/actuation inhaler Inhale 2 puffs into the lungs every 6 (six) hours as needed for Wheezing. Rescue      amiodarone (PACERONE) 200 MG Tab Take 400 mg by mouth 2 (two) times daily.      apixaban (ELIQUIS) 5 mg Tab Take 5 mg by mouth 2 (two) times daily.      fluticasone-umeclidin-vilanter (TRELEGY ELLIPTA) 100-62.5-25 mcg DsDv Inhale 1 puff into the lungs once daily.      guaiFENesin (MUCINEX) 600 mg 12 hr tablet Take 600 mg by mouth once daily.      ipratropium (ATROVENT) 0.02 % nebulizer solution Take 500 mcg by nebulization 4 (four) times daily. Rescue      lansoprazole (PREVACID) 30 MG capsule Take 30 mg by mouth once daily.      methylPREDNISolone (MEDROL DOSEPACK) 4 mg tablet Take 4 mg by mouth. use as directed      metoprolol tartrate (LOPRESSOR) 25 MG tablet Take 25 mg by mouth 2 (two) times daily.      nitroGLYCERIN (NITROSTAT) 0.4 MG SL tablet Place 0.4 mg under the tongue every 5 (five) minutes as needed for Chest pain.      potassium chloride (KLOR-CON) 10 MEQ TbSR Take 20 mEq by mouth once.      rosuvastatin (CRESTOR) 20 MG tablet Take 20 mg by mouth once daily.         STOP taking these medications       amLODIPine (NORVASC) 10 MG tablet Comments:   Reason for Stopping:         furosemide (LASIX) 40 MG tablet Comments:   Reason for Stopping:               I certify that this patient is confined to his home and needs physical therapy and occupational therapy     Cierra Mullen MD  PGY-1  Pager: [phone]  .

## 2019-07-11 NOTE — PROGRESS NOTES
[facility]  Cardiology  Progress Note    Patient Name: Wiley Alfred  MRN: [id number]  Admission Date: 7/5/2019  Hospital Length of Stay: 6 days  Code Status: Full Code   Attending Physician: Aaron Bacon MD   Primary Care Physician: Daniel Almeida MD  Expected Discharge Date: 7/11/2019  Principal Problem:Acute on chronic heart failure    Subjective:   Chief complaint: shortness of breath    HPI:  Patient is 74 y.o. M w/ aortic stenosis, CAD, COPD on home O2, CVA, pAF (eliquis, amiodarone) presented to [facility] w/ acute onset of SOB. Patient was found to be hypertensive  and in hypoxia and hypercapnia respiratory failure requiring nitroglycerin gtt and BIPAP. Received lasix and nebs with solu-medrol with improvement. Patient received MANUEL which showed severe MR, moderate AS and TR with preserved LV function. He was evaluated by CTS, was deemed high risk as a result of the severe COPD; so was transferred to [facility] for further evaluation of possible MVR/TVR/AVR vs TAVR/MV clip in a stable condition by [facility].     Prior to this admission patient was admitted on 6/23/19  Where he underwent LHC which revealed severe single-vessel CAD s/p PCI; patent stented RCA, moderate AS.     Hospital Course:   Transferred from [facility] for surgical/interventional cardiology evaluation for severe AS, mitral and tricuspid regurgitation, as well as ongoing management of ADHF and COPD exacerbation. IV furosemide 40mg QD given with adequate diuresis. Patient went into Afib w/ RVR on hospital day 2 after apneic episode overnight; received BIPAP QHS and was converted back to NSR. His amiodarone and metoprolol tartrate were continued from OSH. On hospital day 6, amiodarone dose was lowered to 200mg QD.     CTS evaluated patient, not recommending surgical option due to high STS risk. Evaluated by Interventional Cardiology, MANUEL for evaluation of aortic valve as part of TAVR work up on 7/10/19.      Patient developed worsening leukocytosis while CXR showed pleural effusion and possible consolidation in RLL. Concerning for aspiration vs HAP, so vanc/zosyn/azithro started 7/10/19.     Interval History: Episodes of dyspnea overnight, resolved after placed back on BIPAP and IV furosemide X 1. Stable for discharge today, pending home health set-up and oxygen for transport, as well as home BIPAP.     Review of Systems   Constitution: Negative for chills and fever.   HENT: Negative for congestion and sore throat.    Eyes: Negative for photophobia and visual disturbance.   Cardiovascular: Positive for dyspnea on exertion. Negative for chest pain, irregular heartbeat, palpitations and syncope.   Respiratory: Positive for cough, shortness of breath and sputum production.    Skin: Negative for itching and rash.   Musculoskeletal: Negative for falls.   Gastrointestinal: Negative for constipation, diarrhea, nausea and vomiting.   Genitourinary: Negative for dysuria and flank pain.   Neurological: Negative for dizziness and headaches.   Psychiatric/Behavioral: Negative for altered mental status and memory loss.     Objective:     Vital Signs (Most Recent):  Temp: 98.1 °F (36.7 °C) (07/11/19 0811)  Pulse: (!) 113 (07/11/19 1659)  Resp: (!) 24 (07/11/19 1548)  BP: 109/72 (07/11/19 1659)  SpO2: (!) 94 % (07/11/19 1659) Vital Signs (24h Range):  Temp:  [97.9 °F (36.6 °C)-98.3 °F (36.8 °C)] 98.1 °F (36.7 °C)  Pulse:  [] 113  Resp:  [16-29] 24  SpO2:  [94 %-99 %] 94 %  BP: (109-131)/(56-72) 109/72     Weight: 83.6 kg (184 lb 6.4 oz)  Body mass index is 28.88 kg/m².     SpO2: (!) 94 %  O2 Device (Oxygen Therapy): nasal cannula      Intake/Output Summary (Last 24 hours) at 7/11/2019 1729  Last data filed at 7/11/2019 0400  Gross per 24 hour   Intake 240 ml   Output 1150 ml   Net -910 ml       Lines/Drains/Airways     Peripheral Intravenous Line                 Peripheral IV - Single Lumen 07/10/19 1029 22 G Left Forearm 1  day         Peripheral IV - Single Lumen 07/10/19 1030 22 G Posterior;Right Hand 1 day                Physical Exam   Constitutional: He is oriented to person, place, and time. No distress.   HENT:   Head: Normocephalic and atraumatic.   Mouth/Throat: Oropharynx is clear and moist.   Eyes: Right eye exhibits no discharge. Left eye exhibits no discharge. No scleral icterus.   Neck: No JVD present.   Cardiovascular: Normal rate and regular rhythm.   Murmur heard.  Pulmonary/Chest: Effort normal. No stridor. No respiratory distress. He has wheezes.   On NC   Abdominal: Soft. Bowel sounds are normal. He exhibits no distension.   Musculoskeletal: He exhibits no edema or tenderness.   Neurological: He is alert and oriented to person, place, and time.   Skin: Skin is warm and dry. He is not diaphoretic.   Vitals reviewed.    Significant Labs:   CMP   Recent Labs   Lab 07/10/19  0355 07/11/19  0353    142   K 4.3 4.2    101   CO2 23 29   GLU 96 119*   BUN 52* 43*   CREATININE 1.7* 1.9*   CALCIUM 9.1 8.9   PROT 5.7* 6.0   ALBUMIN 2.9* 3.1*   BILITOT 0.5 0.7   ALKPHOS 54* 58   AST 17 18   ALT 24 25   ANIONGAP 13 12   ESTGFRAFRICA 44.6* 39.0*   EGFRNONAA 38.6* 33.7*   , CBC   Recent Labs   Lab 07/10/19  0354 07/11/19  0353   WBC 17.13* 17.51*   HGB 7.8* 7.7*   HCT 28.1* 27.9*   * 131*    and All pertinent lab results from the last 24 hours have been reviewed.    Significant Imaging:     No new imaging in past 24 hours.     Assessment and Plan:     * Acute on chronic heart failure  74 y.o. M w/ CAD, COPD on home O2, HTN, CVA, pAF (eliquis, amiodarone) presented w/ acute onset of SOB concerning for ADHF and COPD exacerbation. MANUEL found showed severe MR, moderate AS and TR with preserved LV function. Required BIPAP, nitroglycerin gtt and IV diuresis at OSH.     - Clinically stable  - F/u CXR, BNP, electrolytes  - Diuresed with IV furosemide 40mg QD, now euvolemic   - continue home lopressor 25mg BID,  atorvastatin 40mg  - BIPAP QHS and when napping  - strict I/O  - daily weights  - stepped down 7/6/19    Pleural effusion  Pleural effusion noted on CXR; repeat imaging noted infiltrate and small pleural effusion at the left lung base, the overall pattern is that of progression. Suspect etiology 2/2 HCAP vs aspiration.     Plan:  - BCx NGTD, sputum positive for stenotrophomonas  - vanc/zosyn/doxycycline started  - aspiration precautions  - consult ID, appreciate assistance    CAD (coronary artery disease)  Prior to this admission patient was admitted on 6/23/19  Where he underwent LHC which revealed severe single-vessel CAD s/p PCI; patent stented RCA, moderate AS.     - continue DAPT    Hypertension   at OSH requiring nitroglycerin gtt. Normotensive since transfer to [facility].     - holding home Norvasc for now    COPD exacerbation  50+ year smoking history; on home O2. Received nebs and solu-medrol at OSH.     Plan:  - duo-nebs prn  - oxygen support with NC, goal Sat 88%  - BIPAP QHS and prn  - steroid taper  - vanc/zosyn/azithro started 7/10/19; plan for 7-day course END DATE 7/16/19    Paroxysmal atrial fibrillation  CHADS-VASc 7 - 15.7% risk of stroke/TIA/systemic embolism  Continue amiodarone 400mg BID, metoprolol tartrate 25mg BID  Went into Afib on 7/6/19, HR <115. On heparin gtt.   Transition back to [facility] when appropriate    Severe aortic stenosis  TTE at OSH showed severe MR, moderate AS and TR with preserved LV function. Patient is transferred to [facility] for further evaluation of possible MVR/TVR/AVR vs TAVR/MV clip in a stable condition by [facility].     Plan:  - No surgical option as patient is high STS score  - Evaluated by Interventional Cardiology; will perform MANUEL 7/10/19 as part of TAVR workup      VTE Risk Mitigation (From admission, onward)        Ordered     heparin 25,000 units in dextrose 5% 250 mL (100 units/mL) infusion LOW INTENSITY nomogram - [facility]  Continuous      07/05/19 1152     heparin  25,000 units in dextrose 5% (100 units/ml) IV bolus from bag - ADDITIONAL PRN BOLUS - 30 units/kg  As needed (PRN)      07/05/19 1152     heparin 25,000 units in dextrose 5% (100 units/ml) IV bolus from bag - ADDITIONAL PRN BOLUS - 60 units/kg  As needed (PRN)      07/05/19 1152     IP VTE HIGH RISK PATIENT  Once      07/05/19 1148          Cierra Mullen MD  Cardiology  [facility]

## 2019-07-11 NOTE — NURSING
Spoke with dr. Victor Manuel Hernandez  Patient having labored breathing   Placed patient on bipap   Chest x ray ordered    Continuing to monitor   Aaliyah Solomon RN

## 2019-07-11 NOTE — SUBJECTIVE & OBJECTIVE
Interval History: Episodes of dyspnea overnight, resolved after placed back on BIPAP and IV furosemide X 1. Stable for discharge today, pending home health set-up and oxygen for transport, as well as home BIPAP.     Review of Systems   Constitution: Negative for chills and fever.   HENT: Negative for congestion and sore throat.    Eyes: Negative for photophobia and visual disturbance.   Cardiovascular: Positive for dyspnea on exertion. Negative for chest pain, irregular heartbeat, palpitations and syncope.   Respiratory: Positive for cough, shortness of breath and sputum production.    Skin: Negative for itching and rash.   Musculoskeletal: Negative for falls.   Gastrointestinal: Negative for constipation, diarrhea, nausea and vomiting.   Genitourinary: Negative for dysuria and flank pain.   Neurological: Negative for dizziness and headaches.   Psychiatric/Behavioral: Negative for altered mental status and memory loss.     Objective:     Vital Signs (Most Recent):  Temp: 98.1 °F (36.7 °C) (07/11/19 0811)  Pulse: (!) 113 (07/11/19 1659)  Resp: (!) 24 (07/11/19 1548)  BP: 109/72 (07/11/19 1659)  SpO2: (!) 94 % (07/11/19 1659) Vital Signs (24h Range):  Temp:  [97.9 °F (36.6 °C)-98.3 °F (36.8 °C)] 98.1 °F (36.7 °C)  Pulse:  [] 113  Resp:  [16-29] 24  SpO2:  [94 %-99 %] 94 %  BP: (109-131)/(56-72) 109/72     Weight: 83.6 kg (184 lb 6.4 oz)  Body mass index is 28.88 kg/m².     SpO2: (!) 94 %  O2 Device (Oxygen Therapy): nasal cannula      Intake/Output Summary (Last 24 hours) at 7/11/2019 1729  Last data filed at 7/11/2019 0400  Gross per 24 hour   Intake 240 ml   Output 1150 ml   Net -910 ml       Lines/Drains/Airways     Peripheral Intravenous Line                 Peripheral IV - Single Lumen 07/10/19 1029 22 G Left Forearm 1 day         Peripheral IV - Single Lumen 07/10/19 1030 22 G Posterior;Right Hand 1 day                Physical Exam   Constitutional: He is oriented to person, place, and time. No distress.    HENT:   Head: Normocephalic and atraumatic.   Mouth/Throat: Oropharynx is clear and moist.   Eyes: Right eye exhibits no discharge. Left eye exhibits no discharge. No scleral icterus.   Neck: No JVD present.   Cardiovascular: Normal rate and regular rhythm.   Murmur heard.  Pulmonary/Chest: Effort normal. No stridor. No respiratory distress. He has wheezes.   On NC   Abdominal: Soft. Bowel sounds are normal. He exhibits no distension.   Musculoskeletal: He exhibits no edema or tenderness.   Neurological: He is alert and oriented to person, place, and time.   Skin: Skin is warm and dry. He is not diaphoretic.   Vitals reviewed.    Significant Labs:   CMP   Recent Labs   Lab 07/10/19  0355 07/11/19  0353    142   K 4.3 4.2    101   CO2 23 29   GLU 96 119*   BUN 52* 43*   CREATININE 1.7* 1.9*   CALCIUM 9.1 8.9   PROT 5.7* 6.0   ALBUMIN 2.9* 3.1*   BILITOT 0.5 0.7   ALKPHOS 54* 58   AST 17 18   ALT 24 25   ANIONGAP 13 12   ESTGFRAFRICA 44.6* 39.0*   EGFRNONAA 38.6* 33.7*   , CBC   Recent Labs   Lab 07/10/19  0354 07/11/19  0353   WBC 17.13* 17.51*   HGB 7.8* 7.7*   HCT 28.1* 27.9*   * 131*    and All pertinent lab results from the last 24 hours have been reviewed.    Significant Imaging:     No new imaging in past 24 hours.

## 2019-07-11 NOTE — CARE UPDATE
Rapid Response Nurse Follow-up Note     Followed up with patient for proactive rounding.   No acute issues at this time. Patient voided 500 ml after receiving lasix dose. Reviewed plan of care with charge RNAna.   Please call Rapid Response RN, Sinai Rivera RN with any questions or concerns at [phone].

## 2019-07-11 NOTE — PT/OT/SLP PROGRESS
Occupational Therapy   Treatment    Name: Wiley Alfred  MRN: [id number]  Admitting Diagnosis:  Acute on chronic heart failure  1 Day Post-Op    Recommendations:     Discharge Recommendations: home health OT, home health PT  Discharge Equipment Recommendations:  walker, rolling, commode  Barriers to discharge:  None    Assessment:     Wiley Alfred is a 74 y.o. male with a medical diagnosis of Acute on chronic heart failure.  He presents with the following performance deficits affecting function are weakness, impaired endurance, impaired self care skills, gait instability, impaired functional mobilty, impaired cardiopulmonary response to activity, edema. Pt tolerated and participated well in therapy today. Pt motivated to participate in therapy but still limited to complete activties by SOB and low endurance. Pt displayed global deconditioning requiring increased assistance to perform ADLs and functional mobility to completion. Pt would benefit from continued acute OT services to improve safe and indep mobility and overall occupational functioning.     Rehab Prognosis:  Good; patient would benefit from acute skilled OT services to address these deficits and reach maximum level of function.       Plan:     Patient to be seen 3 x/week to address the above listed problems via self-care/home management, therapeutic activities, therapeutic exercises  · Plan of Care Expires: 08/07/19  · Plan of Care Reviewed with: patient, spouse    Subjective     Pain/Comfort:  · Pain Rating 1: 0/10    Objective:     Communicated with: RN prior to session.  Patient found up in chair with oxygen, peripheral IV, telemetry upon OT entry to room.    General Precautions: Standard, fall   Orthopedic Precautions:N/A     Functional Mobility/Transfers:  · Patient completed Sit <> Stand Transfer with stand by assistance  with  no assistive device   · Functional Mobility: Pt ambulated 300 feet with CGA and pushing IV pole. Pt required 2  rest breaks with purse breathing technique 2/2 to SOB after 150 feet.    Activities of Daily Living:  · Feeding:  set up assistance to eat lunch   · Lower Body Dressing: independence sitting edge of bedside chair.       [facility] 6 Click ADL: 22    Treatment & Education:  Pt and spouse edu on POC/role of OT  Pt edu on energy conservation techniques for dressing and purse lip breathing with activity  Pt edu on daily safe mobility with supervision      Patient left up in chair with all lines intact, call button in reach and family presentEducation:      GOALS:   Multidisciplinary Problems     Occupational Therapy Goals     Not on file          Multidisciplinary Problems (Resolved)        Problem: Occupational Therapy Goal    Goal Priority Disciplines Outcome Interventions   Occupational Therapy Goal   (Resolved)     OT, PT/OT Outcome(s) achieved    Description:  Goals to be met by: 7/15/19     Patient will increase functional independence with ADLs by performing:    UE Dressing with Set-up Assistance.  LE Dressing with Set-up Assistance.  Grooming while standing with Supervision.  Toileting from toilet with Supervision for hygiene and clothing management.   Rolling to Bilateral with Supervision.   Supine to sit with Supervision.  Toilet transfer to toilet with Supervision.                      Time Tracking:     OT Date of Treatment: 07/11/19  OT Start Time: 1145  OT Stop Time: 1200  OT Total Time (min): 15 min    Billable Minutes:Therapeutic Activity 15    RICCO Osborn  7/11/2019

## 2019-07-11 NOTE — CONSULTS
Vancomycin discontinued. Pharmacy will sign off from dosing.    Penelope Ram, PharmD, BCPS, BCCP  Clinical Pharmacy Specialist - Cardiology  Spectra link: [phone]

## 2019-07-11 NOTE — ASSESSMENT & PLAN NOTE
74 year old male with underlying COPD (on home O2), AS, history of CVA, A Fib transferred from [facility]; was originally seen for SOB; MANUEL revealed severe MR, moderate AS and TR with preserved LV function; transferred to see cardiology for possible TAVR; CXR revealed opacification in lung bases; sputum culture revealed stenotrophomonas; ID consulted for recs.     Pt is afebrile. WBC count stable at 17K.  Blood cultures NGTD.     Plan:  1. Continue minocycline 100 mg PO BID for stenotrophomonas  2. Recommend 4 weeks of oral therapy given underlying COPD and stenotrophomonas  3. F/u with ID upon discharge; can f/u with Dr. Werner with ID on [facility] as closer to patient's home or can f/u in our ID clinic  4. Will sign off

## 2019-07-11 NOTE — PROGRESS NOTES
Pt complains of shortness of breath, spo2 99 %. Pt is wheezing, and requesting an prn breathing treatment. Notified Dr. David CARRILLO ordered a one time order of duo nebs and 80 mg of IVP lasix. Order implemented.

## 2019-07-11 NOTE — PROGRESS NOTES
Pt appears to be back in afib, -118 notified Dr. David MD ordered EKG and continue to monitor. No further orders at this time. Will continue to monitor closely.

## 2019-07-11 NOTE — PT/OT/SLP PROGRESS
Physical Therapy Treatment  DISCHARGE NOTE    Patient Name:  Wiley Alfred   MRN:  [id number]    Recommendations:     Discharge Recommendations:  home health PT   Discharge Equipment Recommendations: walker, rolling   Barriers to discharge: None    Assessment:     Wiley Alfred is a 74 y.o. male admitted with a medical diagnosis of Acute on chronic heart failure.  He presents with the following impairments/functional limitations:  weakness, impaired endurance, impaired cardiopulmonary response to activity, decreased safety awareness, impaired balance, gait instability, edema, impaired functional mobilty, impaired self care skills.    Pt is able to complete 6MWT, though required standing rest breaks, and testing indicates deficits in aerobic capacity.  Requires supp O2 at all times.  Recommendation for pt to receive skilled PT services in the home health setting to address the above deficits.  Pt is to be discharged at this time as pt is medically stable.      Rehab Prognosis: Good; patient would benefit from acute skilled PT services to address these deficits and reach maximum level of function.    Recent Surgery: Procedure(s) (LRB):  ECHOCARDIOGRAM, TRANSESOPHAGEAL (N/A) 1 Day Post-Op    Plan:     During this hospitalization, patient to be seen 4 x/week to address the identified rehab impairments via gait training, therapeutic activities, therapeutic exercises, neuromuscular re-education and progress toward the following goals:    · Plan of Care Expires:  08/05/19    Subjective     Chief Complaint: SOB  Patient/Family Comments/goals: To go home  Pain/Comfort:  · Pain Rating 1: 0/10      Objective:     Communicated with nursing prior to session.  Patient found up in chair with oxygen, peripheral IV, telemetry upon PT entry to room.     General Precautions: Standard, fall, respiratory   Orthopedic Precautions:N/A   Braces: N/A     Functional Mobility:  · Transfers:     · Sit to Stand:  independence with no  AD  · Bed to Chair: supervision with  IV pole  using  Stand Pivot  · Toilet Transfer: independence with  no AD  using  Stand Pivot  · Gait: Pt amb 500', S, without AD, pushing IV pole, with 4LPM of supp O2 via NC  · Balance: S: dynamic standing balance with IV pole      AM-PAC 6 CLICK MOBILITY  Turning over in bed (including adjusting bedclothes, sheets and blankets)?: 3  Sitting down on and standing up from a chair with arms (e.g., wheelchair, bedside commode, etc.): 4  Moving from lying on back to sitting on the side of the bed?: 3  Moving to and from a bed to a chair (including a wheelchair)?: 3  Need to walk in hospital room?: 3  Climbing 3-5 steps with a railing?: 3  Basic Mobility Total Score: 19       Therapeutic Activities and Exercises:   Whiteboard updated  Ankle pumps: 20 reps, in sit   LAQs: 20 reps each LE perf individually, in sit  Hip abd/add: 20 reps each LE perf individually, in sit  Hip flex: 20 reps each LE perf individually, in sit  Sit-ups: 20 reps, in reclined sitting posture  Bladder management: ModA to clean up as pt urinated on floor  6MWT: Pt amb 464' = 141.43m, 6/10 RPE following  Gait speed: 0.39 m/s  Ed to perform bed mobility without A from spouse    Patient left up in chair with all lines intact, call button in reach, nursing notified and spouse present.    GOALS:   Multidisciplinary Problems     Physical Therapy Goals     Not on file          Multidisciplinary Problems (Resolved)        Problem: Physical Therapy Goal    Goal Priority Disciplines Outcome Goal Variances Interventions   Physical Therapy Goal   (Resolved)     PT, PT/OT Outcome(s) achieved     Description:  Goals to be met by: 19     Patient will increase functional independence with mobility by performin. Supine to sit with Supervision  2. Sit to stand transfer with Supervision  3. Gait  x 150 feet with Supervision using AD as needed.  4. Lower extremity exercise program x15 reps, with supervision, in order to  increase LE strength and (I) with functional mobility.                        Time Tracking:     PT Received On: 07/11/19  PT Start Time: 0932     PT Stop Time: 1012  PT Total Time (min): 40 min     Billable Minutes: Gait Training 12, Therapeutic Activity 9 and Therapeutic Exercise 15    Treatment Type: Treatment  PT/PTA: PT           Akiko Clemons, PT  07/11/2019

## 2019-07-11 NOTE — PLAN OF CARE
[facility]    HOME HEALTH ORDERS  FACE TO FACE ENCOUNTER    Patient Name: Wiley Alfred  YOB: 1945    PCP: Daniel Almeida MD   PCP Address: [address] [place] [address] [address] [facility] /*  PCP Phone Number: [phone]  PCP Fax: [phone]    Encounter Date: 07/11/2019    Admit to Home Health    Diagnoses:  Active Hospital Problems    Diagnosis  POA    *Acute on chronic heart failure [I50.9]  Yes    Infection due to Stenotrophomonas maltophilia [A49.8]  Yes    Pneumonia [J18.9]  No    Non-rheumatic mitral regurgitation [I34.0]  Yes    Pleural effusion [J90]  No    Severe aortic stenosis [I35.0]  Yes    Paroxysmal atrial fibrillation [I48.0]  Yes    COPD exacerbation [J44.1]  Yes    Hypertension [I10]  Yes    CAD (coronary artery disease) [I25.10]  Yes      Resolved Hospital Problems   No resolved problems to display.       No future appointments.  Follow-up Information     Schedule an appointment as soon as possible for a visit with Ammon Werner MD.    Specialty:  Infectious Diseases  Contact information:  [address]  [facility] [place] [address]  [phone]             Daniel Almeida MD In 1 week.    Specialty:  Family Medicine  Contact information:  [address]  [facility]  [facility] [address]  [phone]                     I have seen and examined this patient face to face today. My clinical findings that support the need for the home health skilled services and home bound status are the following:  Weakness/numbness causing balance and gait disturbance due to Heart Failure, COPD Exacerbation and pulmonary hypertension making it taxing to leave home.  Requiring assistive device to leave home due to unsteady gait caused by  Heart Failure, COPD Exacerbation and pulmonary hypertension.    Allergies:  Review of patient's allergies indicates:   Allergen Reactions    Bactrim  [sulfamethoxazole-trimethoprim] Blisters    Bacitracin/polymyxin Other (See Comments)     inflammation       Diet: cardiac diet    Activities: activity as tolerated    Nursing:   SN to complete comprehensive assessment including routine vital signs. Instruct on disease process and s/s of complications to report to MD. Review/verify medication list sent home with the patient at time of discharge  and instruct patient/caregiver as needed. Frequency may be adjusted depending on start of care date.    Notify MD if SBP > 160 or < 90; DBP > 90 or < 50; HR > 120 or < 50; Temp > 101; Other:   n/a      CONSULTS:    Physical Therapy to evaluate and treat. Evaluate for home safety and equipment needs; Establish/upgrade home exercise program. Perform / instruct on therapeutic exercises, gait training, transfer training, and Range of Motion.  Occupational Therapy to evaluate and treat. Evaluate home environment for safety and equipment needs. Perform/Instruct on transfers, ADL training, ROM, and therapeutic exercises.   to evaluate for community resources/long-range planning.    MISCELLANEOUS CARE:  Heart Failure:      SN to instruct on the following:    Instruct on the definition of CHF.   Instruct on the signs/sympoms of CHF to be reported.   Instruct on and monitor daily weights.   Instruct on factors that cause exacerbation.   Instruct on action, dose, schedule, and side effects of medications.   Instruct on diet as prescribed.   Instruct on activity allowed.   Instruct on life-style modifications for life long management of CHF   SN to assess compliance with daily weights, diet, medications, fluid retention,    safety precautions, activities permitted and life-style modifications.   Additional 1-2 SN visits per week as needed for signs and symptoms     of CHF exacerbation.      For Weight Gain > 2-3 lbs in 1 day or 4-6 lbs over 1 week notify PCP:  Obtain BMP lab test in 3 days  Home Oxygen:  No change, Oxygen  at 4 L/min nasal canula to be used:  Continuously. and Assess oxygen saturation via pulse oximeter as needed for increase in SOB.  COPD: Please ensure that patient has a pulse oximeter and is educated on his normal oxygen saturations: 88-92%  Please ensure patient has a functioning nebulizer and provide education on its usage.  If patient has increased cough or symptoms, please initiate COPD protocol including  schedule appointment with PCP or pulmonologist within 24 hours    COPD and Pulmonary Hypertension:  BIPAP QHS and when napping    WOUND CARE ORDERS  n/a      Medications: Review discharge medications with patient and family and provide education.      Current Discharge Medication List      START taking these medications    Details   aspirin (ECOTRIN) 81 MG EC tablet Take 1 tablet (81 mg total) by mouth once daily. for 7 days  Refills: 0      clopidogrel (PLAVIX) 75 mg tablet Take 1 tablet (75 mg total) by mouth once daily.  Qty: 30 tablet, Refills: 11      minocycline (MINOCIN,DYNACIN) 100 MG capsule Take 1 capsule (100 mg total) by mouth every 12 (twelve) hours.  Qty: 56 capsule, Refills: 0      torsemide (DEMADEX) 20 MG Tab Take 1 tablet (20 mg total) by mouth once daily.  Qty: 30 tablet, Refills: 0         CONTINUE these medications which have NOT CHANGED    Details   albuterol (ACCUNEB) 1.25 mg/3 mL Nebu Take 2.5 mg by nebulization every 6 (six) hours as needed. Rescue      albuterol (PROAIR HFA) 90 mcg/actuation inhaler Inhale 2 puffs into the lungs every 6 (six) hours as needed for Wheezing. Rescue      amiodarone (PACERONE) 200 MG Tab Take 400 mg by mouth 2 (two) times daily.      apixaban (ELIQUIS) 5 mg Tab Take 5 mg by mouth 2 (two) times daily.      fluticasone-umeclidin-vilanter (TRELEGY ELLIPTA) 100-62.5-25 mcg DsDv Inhale 1 puff into the lungs once daily.      guaiFENesin (MUCINEX) 600 mg 12 hr tablet Take 600 mg by mouth once daily.      ipratropium (ATROVENT) 0.02 % nebulizer solution Take 500  mcg by nebulization 4 (four) times daily. Rescue      lansoprazole (PREVACID) 30 MG capsule Take 30 mg by mouth once daily.      methylPREDNISolone (MEDROL DOSEPACK) 4 mg tablet Take 4 mg by mouth. use as directed      metoprolol tartrate (LOPRESSOR) 25 MG tablet Take 25 mg by mouth 2 (two) times daily.      nitroGLYCERIN (NITROSTAT) 0.4 MG SL tablet Place 0.4 mg under the tongue every 5 (five) minutes as needed for Chest pain.      potassium chloride (KLOR-CON) 10 MEQ TbSR Take 20 mEq by mouth once.      rosuvastatin (CRESTOR) 20 MG tablet Take 20 mg by mouth once daily.         STOP taking these medications       amLODIPine (NORVASC) 10 MG tablet Comments:   Reason for Stopping:         furosemide (LASIX) 40 MG tablet Comments:   Reason for Stopping:               I certify that this patient is confined to his home and needs intermittent skilled nursing care, physical therapy and occupational therapy.      Cierra Mullen MD  PGY-1  Pager: [phone]

## 2019-07-12 VITALS
HEART RATE: 63 BPM | TEMPERATURE: 98 F | DIASTOLIC BLOOD PRESSURE: 51 MMHG | HEIGHT: 67 IN | RESPIRATION RATE: 20 BRPM | OXYGEN SATURATION: 94 % | BODY MASS INDEX: 28.94 KG/M2 | SYSTOLIC BLOOD PRESSURE: 109 MMHG | WEIGHT: 184.38 LBS

## 2019-07-12 LAB
ALBUMIN SERPL BCP-MCNC: 2.8 G/DL (ref 3.5–5.2)
ALP SERPL-CCNC: 48 U/L (ref 55–135)
ALT SERPL W/O P-5'-P-CCNC: 25 U/L (ref 10–44)
ANION GAP SERPL CALC-SCNC: 12 MMOL/L (ref 8–16)
APTT BLDCRRT: 52.3 SEC (ref 21–32)
AST SERPL-CCNC: 20 U/L (ref 10–40)
BASOPHILS # BLD AUTO: 0.02 K/UL (ref 0–0.2)
BASOPHILS NFR BLD: 0.2 % (ref 0–1.9)
BILIRUB SERPL-MCNC: 0.7 MG/DL (ref 0.1–1)
BUN SERPL-MCNC: 32 MG/DL (ref 8–23)
CALCIUM SERPL-MCNC: 9.3 MG/DL (ref 8.7–10.5)
CHLORIDE SERPL-SCNC: 105 MMOL/L (ref 95–110)
CO2 SERPL-SCNC: 24 MMOL/L (ref 23–29)
CREAT SERPL-MCNC: 1.5 MG/DL (ref 0.5–1.4)
DIFFERENTIAL METHOD: ABNORMAL
EOSINOPHIL # BLD AUTO: 0.3 K/UL (ref 0–0.5)
EOSINOPHIL NFR BLD: 2.5 % (ref 0–8)
ERYTHROCYTE [DISTWIDTH] IN BLOOD BY AUTOMATED COUNT: 21.7 % (ref 11.5–14.5)
EST. GFR  (AFRICAN AMERICAN): 51.9 ML/MIN/1.73 M^2
EST. GFR  (NON AFRICAN AMERICAN): 44.9 ML/MIN/1.73 M^2
GLUCOSE SERPL-MCNC: 85 MG/DL (ref 70–110)
HCT VFR BLD AUTO: 28.2 % (ref 40–54)
HGB BLD-MCNC: 7.8 G/DL (ref 14–18)
IMM GRANULOCYTES # BLD AUTO: 0.09 K/UL (ref 0–0.04)
IMM GRANULOCYTES NFR BLD AUTO: 0.8 % (ref 0–0.5)
LYMPHOCYTES # BLD AUTO: 2.1 K/UL (ref 1–4.8)
LYMPHOCYTES NFR BLD: 19.7 % (ref 18–48)
MAGNESIUM SERPL-MCNC: 2.2 MG/DL (ref 1.6–2.6)
MCH RBC QN AUTO: 19.5 PG (ref 27–31)
MCHC RBC AUTO-ENTMCNC: 27.7 G/DL (ref 32–36)
MCV RBC AUTO: 70 FL (ref 82–98)
MONOCYTES # BLD AUTO: 0.9 K/UL (ref 0.3–1)
MONOCYTES NFR BLD: 8 % (ref 4–15)
NEUTROPHILS # BLD AUTO: 7.4 K/UL (ref 1.8–7.7)
NEUTROPHILS NFR BLD: 68.8 % (ref 38–73)
NRBC BLD-RTO: 0 /100 WBC
PHOSPHATE SERPL-MCNC: 3.2 MG/DL (ref 2.7–4.5)
PLATELET # BLD AUTO: 145 K/UL (ref 150–350)
PMV BLD AUTO: ABNORMAL FL (ref 9.2–12.9)
POTASSIUM SERPL-SCNC: 4.1 MMOL/L (ref 3.5–5.1)
PROT SERPL-MCNC: 5.8 G/DL (ref 6–8.4)
RBC # BLD AUTO: 4.01 M/UL (ref 4.6–6.2)
SODIUM SERPL-SCNC: 141 MMOL/L (ref 136–145)
WBC # BLD AUTO: 10.82 K/UL (ref 3.9–12.7)

## 2019-07-12 PROCEDURE — 25000003 PHARM REV CODE 250: Performed by: INTERNAL MEDICINE

## 2019-07-12 PROCEDURE — 25000003 PHARM REV CODE 250

## 2019-07-12 PROCEDURE — 25000003 PHARM REV CODE 250: Performed by: STUDENT IN AN ORGANIZED HEALTH CARE EDUCATION/TRAINING PROGRAM

## 2019-07-12 PROCEDURE — 27000221 HC OXYGEN, UP TO 24 HOURS

## 2019-07-12 PROCEDURE — 84100 ASSAY OF PHOSPHORUS: CPT

## 2019-07-12 PROCEDURE — 85025 COMPLETE CBC W/AUTO DIFF WBC: CPT

## 2019-07-12 PROCEDURE — 36415 COLL VENOUS BLD VENIPUNCTURE: CPT

## 2019-07-12 PROCEDURE — 99239 HOSP IP/OBS DSCHRG MGMT >30: CPT | Mod: ,,, | Performed by: INTERNAL MEDICINE

## 2019-07-12 PROCEDURE — 25000242 PHARM REV CODE 250 ALT 637 W/ HCPCS: Performed by: STUDENT IN AN ORGANIZED HEALTH CARE EDUCATION/TRAINING PROGRAM

## 2019-07-12 PROCEDURE — 80053 COMPREHEN METABOLIC PANEL: CPT

## 2019-07-12 PROCEDURE — 99900035 HC TECH TIME PER 15 MIN (STAT)

## 2019-07-12 PROCEDURE — 94640 AIRWAY INHALATION TREATMENT: CPT

## 2019-07-12 PROCEDURE — 99239 PR HOSPITAL DISCHARGE DAY,>30 MIN: ICD-10-PCS | Mod: ,,, | Performed by: INTERNAL MEDICINE

## 2019-07-12 PROCEDURE — 85730 THROMBOPLASTIN TIME PARTIAL: CPT

## 2019-07-12 PROCEDURE — 94761 N-INVAS EAR/PLS OXIMETRY MLT: CPT

## 2019-07-12 PROCEDURE — 63600175 PHARM REV CODE 636 W HCPCS: Performed by: STUDENT IN AN ORGANIZED HEALTH CARE EDUCATION/TRAINING PROGRAM

## 2019-07-12 PROCEDURE — 83735 ASSAY OF MAGNESIUM: CPT

## 2019-07-12 RX ORDER — PREDNISONE 2.5 MG/1
2.5 TABLET ORAL DAILY
Qty: 3 TABLET | Refills: 0 | Status: SHIPPED | OUTPATIENT
Start: 2019-07-12 | End: 2019-07-15

## 2019-07-12 RX ADMIN — GUAIFENESIN 600 MG: 600 TABLET, EXTENDED RELEASE ORAL at 09:07

## 2019-07-12 RX ADMIN — PREDNISONE 5 MG: 5 TABLET ORAL at 09:07

## 2019-07-12 RX ADMIN — IPRATROPIUM BROMIDE AND ALBUTEROL SULFATE 3 ML: .5; 3 SOLUTION RESPIRATORY (INHALATION) at 04:07

## 2019-07-12 RX ADMIN — PANTOPRAZOLE SODIUM 40 MG: 40 TABLET, DELAYED RELEASE ORAL at 09:07

## 2019-07-12 RX ADMIN — IPRATROPIUM BROMIDE AND ALBUTEROL SULFATE 3 ML: .5; 3 SOLUTION RESPIRATORY (INHALATION) at 12:07

## 2019-07-12 RX ADMIN — CLOPIDOGREL 75 MG: 75 TABLET, FILM COATED ORAL at 09:07

## 2019-07-12 RX ADMIN — IPRATROPIUM BROMIDE AND ALBUTEROL SULFATE 3 ML: .5; 3 SOLUTION RESPIRATORY (INHALATION) at 09:07

## 2019-07-12 RX ADMIN — ASPIRIN 81 MG: 81 TABLET, COATED ORAL at 09:07

## 2019-07-12 RX ADMIN — MINOCYCLINE HYDROCHLORIDE 100 MG: 100 CAPSULE ORAL at 09:07

## 2019-07-12 RX ADMIN — METOPROLOL TARTRATE 25 MG: 25 TABLET, FILM COATED ORAL at 09:07

## 2019-07-12 RX ADMIN — APIXABAN 5 MG: 5 TABLET, FILM COATED ORAL at 01:07

## 2019-07-12 RX ADMIN — AMIODARONE HYDROCHLORIDE 200 MG: 200 TABLET ORAL at 09:07

## 2019-07-12 NOTE — PLAN OF CARE
Problem: Adult Inpatient Plan of Care  Goal: Plan of Care Review  Outcome: Ongoing (interventions implemented as appropriate)  Patient remained free of falls/injury/trauma throughout shift. Patient AAOx4. VSS except HR. Patient in Afib on telemetry, MD notified. No complaints of chest pain or SOB. Heparin gtt continues to infuse at prescribed rate of 14 units/kg/hour. Patient on 2 L NC and placed on Bipap during night. Patient continues to void per the urinal at the bedside. Fall risk precautions reviewed and maintained with patient. POC reviewed with patient. Patient verbalized understanding. Will continue to monitor.

## 2019-07-12 NOTE — NURSING
Patient is ready for discharge. Patient stable alert and oriented. IVs and tele removed. No complaints of pain. Discussed discharge plan. Reviewed medications and side effects, appointments, and answered questions with patient and family.

## 2019-07-12 NOTE — NURSING
Attempted to notify on call doctor several times. Dispatched to wrong number via operators. Awaiting response from Dr. Riojas. Will continue to monitor.

## 2019-07-12 NOTE — DISCHARGE SUMMARY
[facility]  Cardiology  Discharge Summary      Patient Name: Wiley Alfred  MRN: [id number]  Admission Date: 7/5/2019  Hospital Length of Stay: 7 days  Discharge Date and Time:  07/12/2019 4:09 PM  Attending Physician: Vijaya att. providers found    Discharging Provider: Cierra Mullen MD  Primary Care Physician: Daniel Almeida MD    HPI:   Patient is 74 y.o. M w/ aortic stenosis, CAD, COPD on home O2, CVA, pAF (eliquis, amiodarone) presented to [facility] w/ acute onset of SOB. Patient was found to be hypertensive  and in hypoxia and hypercapnia respiratory failure requiring nitroglycerin gtt and BIPAP. Received lasix and nebs with solu-medrol with improvement. Patient received MANUEL which showed severe MR, moderate AS and TR with preserved LV function. He was evaluated by CTS, was deemed high risk as a result of the severe COPD; so was transferred to [facility] for further evaluation of possible MVR/TVR/AVR vs TAVR/MV clip in a stable condition by [facility].     Prior to this admission patient was admitted on 6/23/19  Where he underwent LHC which revealed severe single-vessel CAD s/p PCI; patent stented RCA, moderate AS.     Procedure(s) (LRB):  ECHOCARDIOGRAM, TRANSESOPHAGEAL (N/A)     Indwelling Lines/Drains at time of discharge:  Lines/Drains/Airways          None          Hospital Course:  Transferred from [facility] for surgical/interventional cardiology evaluation for severe AS, mitral and tricuspid regurgitation, as well as ongoing management of ADHF and COPD exacerbation. IV furosemide 40mg QD given with adequate diuresis. Patient went into Afib w/ RVR on hospital day 2 after apneic episode overnight; received BIPAP QHS and was converted back to NSR. His amiodarone and metoprolol tartrate were continued from OSH. On hospital day 6, amiodarone dose was lowered to 200mg QD.     Patient developed worsening leukocytosis while CXR showed pleural effusion and possible consolidation in RLL.  Concerning for aspiration vs HAP, so vanc/zosyn/azithro started 7/10/19. Sputum culture was positive for stenotrophomonas, so abx changed to minocycline, with plan to complete 4-week course. Patient will f/u with ID clinic in [place].     CTS evaluated patient, not recommending surgical option due to high STS risk. Evaluated by Interventional Cardiology, MANUEL showed severe mitral regurgitation. EROA is 0.29 cm2. Plan to follow-up outpatient, tentatively planning for TAVR after clearance by Infectious Disease.    TTE 7/10/19:  · Normal appearing left atrial appendage. No thrombus is present in the appendage.  · Normal left ventricular systolic function. The estimated ejection fraction is 60%.  · There is mild leaflet calcification of the Mitral Valve.  · Severe mitral regurgitation. EROA is 0.29 cm2. Regurgitant volume is 59 mL. Systolic reversal seen in the right upper pulmonary vein.  · Dense calcification of aortic valve. Visually TYLER looks severly stenotic. LVOT measurement is 2.4 x 2.5 cm with area 4.2 cm2, perimeter 7.4 cm.  · Mild to Moderate aortic regurgitation. Eccentrically directed jet.  · Normal right ventricular systolic function.  · Moderate to severe tricuspid regurgitation.  · Moderate right atrial enlargement.  · Pulmonary hypertension present. The estimated PA systolic pressure is 60 mmHg.  · Grade 3 plaque present.  · No interatrial septal defect present.    Vitals:    07/12/19 1228   BP:    Pulse: 63   Resp: 20   Temp:      Physical Exam   Constitutional: He is oriented to person, place, and time. No distress.   HENT:   Head: Normocephalic and atraumatic.   Mouth/Throat: Oropharynx is clear and moist.   Eyes: Right eye exhibits no discharge. Left eye exhibits no discharge. No scleral icterus.   Neck: No JVD present.   Cardiovascular: Normal rate and regular rhythm.   Murmur heard.  Pulmonary/Chest: Effort normal. No stridor. No respiratory distress. He has wheezes.   On NC   Abdominal: Soft.  Bowel sounds are normal. He exhibits no distension.   Musculoskeletal: He exhibits no edema or tenderness.   Neurological: He is alert and oriented to person, place, and time.   Skin: Skin is warm and dry. He is not diaphoretic.   Vitals reviewed.      Consults:   Consults (From admission, onward)        Status Ordering Provider     Inpatient consult to Cardiothoracic Surgery  Once     Provider:  (Not yet assigned)    Completed ASHA CABRERA     Inpatient consult to Infectious Diseases  Once     Provider:  (Not yet assigned)    Completed VIJI OSBORNE     Inpatient consult to Interventional Cardiology  Once     Provider:  (Not yet assigned)    Completed ASHA CABRERA     Inpatient consult to Registered Dietitian/Nutritionist  Once     Provider:  (Not yet assigned)    Completed MARKY PICKETT     Pharmacy to dose Vancomycin consult  Once     Provider:  (Not yet assigned)    Completed VIJI OSBORNE          Significant Diagnostic Studies: Labs:   CMP   Recent Labs   Lab 07/11/19  0353 07/12/19  0545    141   K 4.2 4.1    105   CO2 29 24   * 85   BUN 43* 32*   CREATININE 1.9* 1.5*   CALCIUM 8.9 9.3   PROT 6.0 5.8*   ALBUMIN 3.1* 2.8*   BILITOT 0.7 0.7   ALKPHOS 58 48*   AST 18 20   ALT 25 25   ANIONGAP 12 12   ESTGFRAFRICA 39.0* 51.9*   EGFRNONAA 33.7* 44.9*    and CBC   Recent Labs   Lab 07/11/19  0353 07/12/19  0545   WBC 17.51* 10.82   HGB 7.7* 7.8*   HCT 27.9* 28.2*   * 145*     Radiology: X-Ray: CXR: X-Ray Chest 1 View (CXR):   Results for orders placed or performed during the hospital encounter of 07/05/19   X-Ray Chest 1 View    Narrative    EXAMINATION:  XR CHEST 1 VIEW    CLINICAL HISTORY:  SOB;    TECHNIQUE:  Single frontal view of the chest was performed.    COMPARISON:  07/10/2019.    FINDINGS:  Monitoring EKG leads are present.  The trachea is unremarkable.  There is stable enlargement of the cardiomediastinal silhouette.  There is prominence of the hilum.   There is no evidence of free air beneath the hemidiaphragms.  There stable small bilateral pleural effusions.  There is no evidence of a pneumothorax.  There is no evidence of pneumomediastinum.  There is worsening bibasilar airspace opacities with right greater than left.  There are degenerative changes in the osseous structures.      Impression    Worsening bibasilar airspace opacities with right greater than left.  The findings may represent evolving pneumonia.    Cardiomegaly with trace bilateral pleural effusions.  Some underlying CHF may be present.      Electronically signed by: Harshil Cortez MD  Date:    07/10/2019  Time:    20:36    and X-Ray Chest PA and Lateral (CXR):   Results for orders placed or performed during the hospital encounter of 07/05/19   X-Ray Chest PA And Lateral    Narrative    EXAMINATION:  XR CHEST PA AND LATERAL    CLINICAL HISTORY:  Consolidation;    TECHNIQUE:  PA and lateral views of the chest were performed.    COMPARISON:  No 07/08/2019 ne    FINDINGS:  Opacification at the lung bases and blunted costophrenic angles bilaterally consistent with pleural effusion.  Slight bibasilar edema.  The lung apices are clear.  Mild cardiomegaly.      Impression    See above      Electronically signed by: Live Romano MD  Date:    07/09/2019  Time:    12:32       Pending Diagnostic Studies:     None          Final Active Diagnoses:    Diagnosis Date Noted POA    PRINCIPAL PROBLEM:  Acute on chronic heart failure [I50.9] 07/04/2019 Yes    Infection due to Stenotrophomonas maltophilia [A49.8]  Yes    Pulmonary hypertension [I27.20]  Unknown    Arrhythmia [I49.9]  Unknown    Tachycardia [R00.0]  Unknown    Pneumonia [J18.9] 07/10/2019 No    Non-rheumatic mitral regurgitation [I34.0] 07/08/2019 Yes    Pleural effusion [J90] 07/08/2019 No    Severe aortic stenosis [I35.0] 07/05/2019 Yes    Paroxysmal atrial fibrillation [I48.0] 07/05/2019 Yes    COPD exacerbation [J44.1] 07/05/2019 Yes     "Hypertension [I10] 07/05/2019 Yes    CAD (coronary artery disease) [I25.10] 07/05/2019 Yes      Problems Resolved During this Admission:     No new Assessment & Plan notes have been filed under this hospital service since the last note was generated.  Service: Cardiology      Discharged Condition: stable    [place] [facility]    Follow Up:  Follow-up Information     Schedule an appointment as soon as possible for a visit with Ammon Werner MD.    Specialty:  Infectious Diseases  Contact information:  [address]  [address]  [facility] [place] [address]  [phone]             Daniel Almeida MD In 1 week.    Specialty:  Family Medicine  Contact information:  [address]  [address]  [facility]  [facility] [address]  [phone]                 Patient Instructions:      WALKER FOR HOME USE     Order Specific Question Answer Comments   Type of Walker: Murray (4'4"-5'7")    With wheels? Yes    Height: 5' 7" (1.702 m)    Weight: 83.6 kg (184 lb 6.4 oz)    Length of need (1-99 months): 99    Does patient have medical equipment at home? shower chair    Does patient have medical equipment at home? oxygen    Does patient have medical equipment at home? cane, straight    Please check all that apply: Patient's condition impairs ambulation.    Please check all that apply: Patient is unable to safely ambulate without equipment.    Please check all that apply: Walker will be used for gait training.      COMMODE FOR HOME USE     Order Specific Question Answer Comments   Type: Standard    Height: 5' 7" (1.702 m)    Weight: 83.6 kg (184 lb 6.4 oz)    Does patient have medical equipment at home? shower chair    Does patient have medical equipment at home? oxygen    Does patient have medical equipment at home? cane, straight    Length of need (1-99 months): 99    Vendor: [employer]    Expected Date of Delivery: 7/11/2019      BIPAP FOR HOME USE     Order Specific Question Answer " Comments   Type: Auto BiPap    Auto BiPap setting (cmH20) Inspiratory Range: 8    Auto BiPap setting (cmH20) Expiratory Range: 5    Length of need (1-99 months): 99    Humidification: Heated    Type of mask: FFM    BiPap supply refills: 12    Vendor: [employer]    Expected Date of Delivery: 7/11/2019      CPAP/BIPAP SUPPLIES     Order Specific Question Answer Comments   Type of mask: FFM    Headgear? Yes    Tubing? Yes    Humidifier chamber? Yes    Chin strap? Yes    Filters? Yes    Cushions? Yes    Length of need (1-99 months): 99    Vendor: [employer]    Expected Date of Delivery: 7/11/2019      BIPAP FOR HOME USE     Order Specific Question Answer Comments   Type: BiPap    BiPap setting (cmH20) Inspiratory: 14    BiPap setting (cmH20) Expiratory: 6    Length of need (1-99 months): 2    Humidification: Heated    Type of mask: FFM    Accessories needed: Cushions    Accessories needed: Filters    Accessories needed: Headgear    Accessories needed: Humidifier chamber    Accessories needed: Tubing    BiPap supply refills: 12    Vendor: [employer]    Expected Date of Delivery: 7/11/2019      Ambulatory Referral to Infectious Disease   Referral Priority: Routine Referral Type: Consultation   Referral Reason: Specialty Services Required   Referred to Provider: PRASHANTH GARAY Requested Specialty: Infectious Diseases   Number of Visits Requested: 1     Ambulatory Referral to Interventional Cardiology   Referral Priority: Routine Referral Type: Consultation   Referral Reason: Specialty Services Required   Requested Specialty: INTERVENTIONAL CARDIOLOGY   Number of Visits Requested: 1     Ambulatory consult to Infectious Disease   Referral Priority: Routine Referral Type: Consultation   Referral Reason: Specialty Services Required   Requested Specialty: Infectious Diseases   Number of Visits Requested: 1     Medications:  Reconciled Home Medications:      Medication List      START taking these medications    aspirin 81 MG EC  tablet  Commonly known as:  ECOTRIN  Take 1 tablet (81 mg total) by mouth once daily. for 7 days     clopidogrel 75 mg tablet  Commonly known as:  PLAVIX  Take 1 tablet (75 mg total) by mouth once daily.     minocycline 100 MG capsule  Commonly known as:  MINOCIN,DYNACIN  Take 1 capsule (100 mg total) by mouth every 12 (twelve) hours FOR 28 DAYS     predniSONE 2.5 MG tablet  Commonly known as:  DELTASONE  Take 1 tablet (2.5 mg total) by mouth once daily for 3 days     torsemide 20 MG Tab  Commonly known as:  DEMADEX  Take 1 tablet (20 mg total) by mouth once daily.        CHANGE how you take these medications    amiodarone 200 MG Tab  Commonly known as:  PACERONE  Take 1 tablet (200 mg total) by mouth once daily.  What changed:    · how much to take  · when to take this        CONTINUE taking these medications    * albuterol 1.25 mg/3 mL Nebu  Commonly known as:  ACCUNEB  Take 2.5 mg by nebulization every 6 (six) hours as needed. Rescue     * PROAIR HFA 90 mcg/actuation inhaler  Generic drug:  albuterol  Inhale 2 puffs into the lungs every 6 (six) hours as needed for Wheezing. Rescue     ELIQUIS 5 mg Tab  Generic drug:  apixaban  Take 5 mg by mouth 2 (two) times daily.     guaiFENesin 600 mg 12 hr tablet  Commonly known as:  MUCINEX  Take 600 mg by mouth once daily.     ipratropium 0.02 % nebulizer solution  Commonly known as:  ATROVENT  Take 500 mcg by nebulization 4 (four) times daily. Rescue     lansoprazole 30 MG capsule  Commonly known as:  PREVACID  Take 30 mg by mouth once daily.     methylPREDNISolone 4 mg tablet  Commonly known as:  MEDROL DOSEPACK  Take 4 mg by mouth. use as directed     metoprolol tartrate 25 MG tablet  Commonly known as:  LOPRESSOR  Take 25 mg by mouth 2 (two) times daily.     nitroGLYCERIN 0.4 MG SL tablet  Commonly known as:  NITROSTAT  Place 0.4 mg under the tongue every 5 (five) minutes as needed for Chest pain.     potassium chloride 10 MEQ Tbsr  Commonly known as:  KLOR-CON  Take  20 mEq by mouth once.     rosuvastatin 20 MG tablet  Commonly known as:  CRESTOR  Take 20 mg by mouth once daily.     TRELEGY ELLIPTA 100-62.5-25 mcg Dsdv  Generic drug:  fluticasone-umeclidin-vilanter  Inhale 1 puff into the lungs once daily.         * This list has 2 medication(s) that are the same as other medications prescribed for you. Read the directions carefully, and ask your doctor or other care provider to review them with you.            STOP taking these medications    amLODIPine 10 MG tablet  Commonly known as:  NORVASC     furosemide 40 MG tablet  Commonly known as:  LASIX            Time spent on the discharge of patient: 60 minutes    Cierra Mullen MD  Cardiology  [facility]

## 2019-07-12 NOTE — NURSING
Patient in A-fib on telemetry. Beulah CARRILLO notified. No new orders placed at this time. Will continue to monitor.

## 2019-07-13 LAB
BACTERIA BLD CULT: NORMAL
BACTERIA BLD CULT: NORMAL

## 2019-07-15 ENCOUNTER — PATIENT OUTREACH (OUTPATIENT)
Dept: ADMINISTRATIVE | Facility: CLINIC | Age: 74
End: 2019-07-15

## 2019-07-15 ENCOUNTER — DOCUMENTATION ONLY (OUTPATIENT)
Dept: CARDIOLOGY | Facility: CLINIC | Age: 74
End: 2019-07-15

## 2019-07-15 LAB
BACTERIA BLD CULT: NORMAL
BACTERIA BLD CULT: NORMAL

## 2019-07-15 NOTE — PATIENT INSTRUCTIONS

## 2019-07-15 NOTE — PROGRESS NOTES
Wiley Alfred is a 74 y.o. male referred by Dr. Tommie Javed for evaluation of severe AS (NYHA Class III sx).    The patient has undergone the following TAVR work-up:   ECHO (Date 7/5/19): TYLER= 0.99 cm2, MG= 44mmHg, Peak Nate= 4.14 m/s, EF= 68%.   LHC (Date 6/26/19): Patent RCA Stent. Nonobstructive CAD   STS: 22%   Frailty: 3/4; passed hand    CTA not done given CKD and Aortogram with runoff performed at time of LHC and L iliacs are large enough  LVOT area by MANUEL on 7/10/19 [id number]  CT Surgery risk assessment: High risk, per Dr Dr. Pollock due to STS score and comobidities  Rhythm issues: Afib with normal QRS  PFTs: FEV1 41% predicted, DLCO 55% predicted.  Comorbidities: CKD, CAD, Afib      Wiley Alfred is a 26 mm  Chaim(12% OS) valve candidate via L TF access. Pt first needs to see ID to ensure his STENOTROPHOMONAS (X.) MALTOPHILIA respiratory infection from 7/8/19 is cleared prior to proceeding with picking a date for TAVR.

## 2019-07-16 NOTE — PT/OT/SLP DISCHARGE
Occupational Therapy Discharge Summary    Wiley Alfred  MRN: [id number]   Principal Problem: Acute on chronic heart failure      Patient Discharged from acute Occupational Therapy on 7/11/19.  Please refer to prior OT note dated 7/11/19 for functional status.    Assessment:      Patient was discharged unexpectedly.  Information required to complete an accurate discharge summary is unknown.  Refer to therapy initial evaluation and last progress note for initial and most recent functional status and goal achievement.  Recommendations made may be found in medical record.    Objective:     GOALS:   Multidisciplinary Problems     Occupational Therapy Goals     Not on file          Multidisciplinary Problems (Resolved)        Problem: Occupational Therapy Goal    Goal Priority Disciplines Outcome Interventions   Occupational Therapy Goal   (Resolved)     OT, PT/OT Outcome(s) achieved    Description:  Goals to be met by: 7/15/19     Patient will increase functional independence with ADLs by performing:    UE Dressing with Set-up Assistance.  LE Dressing with Set-up Assistance.  Grooming while standing with Supervision.  Toileting from toilet with Supervision for hygiene and clothing management.   Rolling to Bilateral with Supervision.   Supine to sit with Supervision.  Toilet transfer to toilet with Supervision.                      Reasons for Discontinuation of Therapy Services  Transfer to alternate level of care.      Plan:     Patient Discharged to: Home with Home Health Service    Deedee Coker OT  7/16/2019

## 2019-07-19 ENCOUNTER — TELEPHONE (OUTPATIENT)
Dept: CARDIOLOGY | Facility: CLINIC | Age: 74
End: 2019-07-19

## 2019-07-19 NOTE — TELEPHONE ENCOUNTER
----- Message from Kat Wallace MA sent at 7/19/2019  9:25 AM CDT -----  Contact: patient wife called/ Dipti   The patient wife Dipti would like to talk to Dr. Tellez about his weight the patient was d/c Friday is weight was 183.8 today it 178.8. Please call [phone]. Thank you.

## 2019-07-19 NOTE — TELEPHONE ENCOUNTER
Pt temp going up now 100.8. Wife concern about pneumonia and pt weight changes. Dr Gastelum notified and instructed pt to go to ER.

## 2019-07-20 ENCOUNTER — TELEPHONE (OUTPATIENT)
Dept: CARDIOLOGY | Facility: HOSPITAL | Age: 74
End: 2019-07-20

## 2019-07-20 NOTE — TELEPHONE ENCOUNTER
Spoke to wife, he is doing much better today, workup at ED showed normal WBC count and clear CXR, he was discharged after one dose of IV antibiotics.    Javier Tellze     ----- Message from Yana Mercado RN sent at 7/19/2019 11:25 AM CDT -----  Contact: patient wife called/ Dipti       ----- Message -----  From: Kat Wallace MA  Sent: 7/19/2019   9:25 AM  To: Yana Mercado RN    The patient wife Dipti would like to talk to Dr. Tellez about his weight the patient was d/c Friday is weight was 183.8 today it 178.8. Please call [phone]. Thank you.

## 2019-07-24 ENCOUNTER — TELEPHONE (OUTPATIENT)
Dept: CARDIOLOGY | Facility: CLINIC | Age: 74
End: 2019-07-24

## 2019-07-24 DIAGNOSIS — I35.0 NODULAR CALCIFIC AORTIC VALVE STENOSIS: Primary | ICD-10-CM

## 2019-07-24 NOTE — TELEPHONE ENCOUNTER
Called and spoke to the patient's wife this morning.  He is still on antibiotics at present and saw ID yesterday.  They will fax notes.  Will see back in clinic after 6 weeks and if cleared will proceed with TAVR.

## 2019-08-20 ENCOUNTER — TELEPHONE (OUTPATIENT)
Dept: CARDIOLOGY | Facility: CLINIC | Age: 74
End: 2019-08-20

## 2019-08-20 NOTE — TELEPHONE ENCOUNTER
08/20/2019  5:40 PM    Patient's wife called with questions about timing of TAVR, given recent heart failure exacerbation.    Have directed her inquiries to our TAVR clinic.    Javier Tellez

## 2021-06-03 ENCOUNTER — HOSPITAL ENCOUNTER (OUTPATIENT)
Dept: TELEMEDICINE | Facility: HOSPITAL | Age: 76
Discharge: HOME | End: 2021-06-03
Payer: MEDICARE

## 2021-06-03 PROCEDURE — 99499 UNLISTED E&M SERVICE: CPT | Mod: 95,,, | Performed by: PSYCHIATRY & NEUROLOGY

## 2021-06-03 PROCEDURE — 99499 NO LOS: ICD-10-PCS | Mod: 95,,, | Performed by: PSYCHIATRY & NEUROLOGY

## 2021-07-08 NOTE — PLAN OF CARE
Problem: Adult Inpatient Plan of Care  Goal: Plan of Care Review  Outcome: Ongoing (interventions implemented as appropriate)  Plan of care discussed with patient. Patient is free of fall/trauma/injury. Denies CP, SOB, pain/discomfort. VSS. Heparin maintained per order. Pt is on 2L NC. Plan is to discharge patient.  All questions addressed. Will continue to monitor       Hide Include Location In Plan Question?: No Detail Level: Zone

## 2021-08-16 NOTE — PLAN OF CARE
consulted to arrange home health.  Met with pts wife and brother at the bedside.  pts wife is requesting [facility] in [place]  sw made referral to [facility] via [facility].  pts brother-in-law will  pt and wife for transportation home.     07/11/19 1522   Post-Acute Status   Post-Acute Authorization Home Health/Hospice   Home Health/Hospice Status Referrals Sent      No